# Patient Record
Sex: FEMALE | Race: BLACK OR AFRICAN AMERICAN | Employment: FULL TIME | URBAN - METROPOLITAN AREA
[De-identification: names, ages, dates, MRNs, and addresses within clinical notes are randomized per-mention and may not be internally consistent; named-entity substitution may affect disease eponyms.]

---

## 2017-01-13 ENCOUNTER — TELEPHONE (OUTPATIENT)
Dept: BARIATRICS/WEIGHT MGMT | Age: 57
End: 2017-01-13

## 2017-01-20 ENCOUNTER — TELEPHONE (OUTPATIENT)
Dept: BARIATRICS/WEIGHT MGMT | Age: 57
End: 2017-01-20

## 2017-01-20 ENCOUNTER — OFFICE VISIT (OUTPATIENT)
Dept: BARIATRICS/WEIGHT MGMT | Age: 57
End: 2017-01-20

## 2017-01-20 VITALS
DIASTOLIC BLOOD PRESSURE: 71 MMHG | HEART RATE: 78 BPM | RESPIRATION RATE: 16 BRPM | BODY MASS INDEX: 39.84 KG/M2 | WEIGHT: 216.5 LBS | SYSTOLIC BLOOD PRESSURE: 120 MMHG | HEIGHT: 62 IN

## 2017-01-20 DIAGNOSIS — K44.9 HIATAL HERNIA: ICD-10-CM

## 2017-01-20 DIAGNOSIS — G47.33 OSA (OBSTRUCTIVE SLEEP APNEA): ICD-10-CM

## 2017-01-20 DIAGNOSIS — K21.9 GASTROESOPHAGEAL REFLUX DISEASE WITHOUT ESOPHAGITIS: ICD-10-CM

## 2017-01-20 DIAGNOSIS — E66.9 OBESITY (BMI 30-39.9): Primary | ICD-10-CM

## 2017-01-20 DIAGNOSIS — I87.2 VENOUS INSUFFICIENCY OF BOTH LOWER EXTREMITIES: ICD-10-CM

## 2017-01-20 PROCEDURE — 99244 OFF/OP CNSLTJ NEW/EST MOD 40: CPT | Performed by: SURGERY

## 2017-01-20 RX ORDER — OXYCODONE HYDROCHLORIDE AND ACETAMINOPHEN 5; 325 MG/1; MG/1
TABLET ORAL
COMMUNITY
Start: 2016-11-01 | End: 2017-05-05 | Stop reason: ALTCHOICE

## 2017-01-25 ENCOUNTER — TELEPHONE (OUTPATIENT)
Dept: BARIATRICS/WEIGHT MGMT | Age: 57
End: 2017-01-25

## 2017-03-14 ENCOUNTER — INITIAL CONSULT (OUTPATIENT)
Dept: BARIATRICS/WEIGHT MGMT | Age: 57
End: 2017-03-14

## 2017-03-14 ENCOUNTER — TELEPHONE (OUTPATIENT)
Dept: BARIATRICS/WEIGHT MGMT | Age: 57
End: 2017-03-14

## 2017-03-14 DIAGNOSIS — E66.9 OBESITY (BMI 30-39.9): ICD-10-CM

## 2017-03-14 DIAGNOSIS — F34.1 DYSTHYMIC DISORDER: Primary | ICD-10-CM

## 2017-03-14 PROCEDURE — 90791 PSYCH DIAGNOSTIC EVALUATION: CPT | Performed by: PSYCHOLOGIST

## 2017-03-14 PROCEDURE — 96101 PR PSYCHOLOGIC TESTING BY PSYCH/PHYS: CPT | Performed by: PSYCHOLOGIST

## 2017-03-29 ENCOUNTER — OFFICE VISIT (OUTPATIENT)
Dept: BARIATRICS/WEIGHT MGMT | Age: 57
End: 2017-03-29

## 2017-03-29 VITALS
HEART RATE: 91 BPM | WEIGHT: 219 LBS | BODY MASS INDEX: 40.3 KG/M2 | HEIGHT: 62 IN | DIASTOLIC BLOOD PRESSURE: 73 MMHG | SYSTOLIC BLOOD PRESSURE: 126 MMHG | RESPIRATION RATE: 16 BRPM

## 2017-03-29 DIAGNOSIS — E66.01 MORBID OBESITY WITH BMI OF 40.0-44.9, ADULT (HCC): ICD-10-CM

## 2017-03-29 DIAGNOSIS — G47.33 OSA (OBSTRUCTIVE SLEEP APNEA): Primary | ICD-10-CM

## 2017-03-29 PROCEDURE — 99213 OFFICE O/P EST LOW 20 MIN: CPT | Performed by: NURSE PRACTITIONER

## 2017-03-29 ASSESSMENT — ENCOUNTER SYMPTOMS
EYES NEGATIVE: 1
ALLERGIC/IMMUNOLOGIC NEGATIVE: 1
RESPIRATORY NEGATIVE: 1
GASTROINTESTINAL NEGATIVE: 1

## 2017-04-25 ENCOUNTER — TELEPHONE (OUTPATIENT)
Dept: BARIATRICS/WEIGHT MGMT | Age: 57
End: 2017-04-25

## 2017-05-03 ENCOUNTER — TELEPHONE (OUTPATIENT)
Dept: INTERNAL MEDICINE CLINIC | Age: 57
End: 2017-05-03

## 2017-05-03 ENCOUNTER — OFFICE VISIT (OUTPATIENT)
Dept: BARIATRICS/WEIGHT MGMT | Age: 57
End: 2017-05-03

## 2017-05-03 VITALS
HEART RATE: 78 BPM | HEIGHT: 62 IN | BODY MASS INDEX: 40.67 KG/M2 | WEIGHT: 221 LBS | SYSTOLIC BLOOD PRESSURE: 117 MMHG | DIASTOLIC BLOOD PRESSURE: 67 MMHG

## 2017-05-03 DIAGNOSIS — E66.01 MORBID OBESITY WITH BMI OF 40.0-44.9, ADULT (HCC): Primary | ICD-10-CM

## 2017-05-03 DIAGNOSIS — E55.9 VITAMIN D DEFICIENCY: ICD-10-CM

## 2017-05-03 DIAGNOSIS — K21.9 GASTROESOPHAGEAL REFLUX DISEASE WITHOUT ESOPHAGITIS: ICD-10-CM

## 2017-05-03 PROCEDURE — 99213 OFFICE O/P EST LOW 20 MIN: CPT | Performed by: NURSE PRACTITIONER

## 2017-05-03 RX ORDER — FUROSEMIDE 40 MG/1
40 TABLET ORAL DAILY PRN
Qty: 30 TABLET | Refills: 0 | Status: SHIPPED | OUTPATIENT
Start: 2017-05-03 | End: 2019-11-27

## 2017-05-03 RX ORDER — POTASSIUM CHLORIDE 20 MEQ/1
TABLET, EXTENDED RELEASE ORAL
Qty: 30 TABLET | Refills: 0 | Status: SHIPPED | OUTPATIENT
Start: 2017-05-03 | End: 2020-03-25 | Stop reason: SDUPTHER

## 2017-05-03 ASSESSMENT — ENCOUNTER SYMPTOMS
ALLERGIC/IMMUNOLOGIC NEGATIVE: 1
EYES NEGATIVE: 1
RESPIRATORY NEGATIVE: 1
GASTROINTESTINAL NEGATIVE: 1

## 2017-05-08 ENCOUNTER — TELEPHONE (OUTPATIENT)
Dept: BARIATRICS/WEIGHT MGMT | Age: 57
End: 2017-05-08

## 2017-05-25 ENCOUNTER — OFFICE VISIT (OUTPATIENT)
Dept: INTERNAL MEDICINE CLINIC | Age: 57
End: 2017-05-25

## 2017-05-25 VITALS
DIASTOLIC BLOOD PRESSURE: 84 MMHG | WEIGHT: 219 LBS | SYSTOLIC BLOOD PRESSURE: 120 MMHG | HEIGHT: 63 IN | HEART RATE: 78 BPM | BODY MASS INDEX: 38.8 KG/M2

## 2017-05-25 DIAGNOSIS — K21.9 GASTROESOPHAGEAL REFLUX DISEASE WITHOUT ESOPHAGITIS: ICD-10-CM

## 2017-05-25 DIAGNOSIS — F41.9 ANXIETY: ICD-10-CM

## 2017-05-25 DIAGNOSIS — I87.2 VENOUS INSUFFICIENCY OF BOTH LOWER EXTREMITIES: ICD-10-CM

## 2017-05-25 DIAGNOSIS — G47.33 OSA (OBSTRUCTIVE SLEEP APNEA): ICD-10-CM

## 2017-05-25 DIAGNOSIS — K44.9 HIATAL HERNIA: ICD-10-CM

## 2017-05-25 DIAGNOSIS — E53.8 B12 DEFICIENCY: ICD-10-CM

## 2017-05-25 DIAGNOSIS — F43.9 STRESS: ICD-10-CM

## 2017-05-25 DIAGNOSIS — E55.9 VITAMIN D DEFICIENCY: ICD-10-CM

## 2017-05-25 DIAGNOSIS — F32.A DEPRESSION, UNCONTROLLED: ICD-10-CM

## 2017-05-25 DIAGNOSIS — E66.9 OBESITY (BMI 30-39.9): ICD-10-CM

## 2017-05-25 DIAGNOSIS — Z00.00 ANNUAL PHYSICAL EXAM: Primary | ICD-10-CM

## 2017-05-25 PROBLEM — E66.01 MORBID OBESITY WITH BMI OF 40.0-44.9, ADULT (HCC): Status: RESOLVED | Noted: 2017-03-29 | Resolved: 2017-05-25

## 2017-05-25 LAB
A/G RATIO: 2 (ref 1.1–2.2)
ALBUMIN SERPL-MCNC: 4.3 G/DL (ref 3.4–5)
ALP BLD-CCNC: 84 U/L (ref 40–129)
ALT SERPL-CCNC: 31 U/L (ref 10–40)
ANION GAP SERPL CALCULATED.3IONS-SCNC: 14 MMOL/L (ref 3–16)
AST SERPL-CCNC: 26 U/L (ref 15–37)
BASOPHILS ABSOLUTE: 0.1 K/UL (ref 0–0.2)
BASOPHILS RELATIVE PERCENT: 1.3 %
BILIRUB SERPL-MCNC: 0.3 MG/DL (ref 0–1)
BILIRUBIN, POC: NORMAL
BLOOD URINE, POC: NORMAL
BUN BLDV-MCNC: 12 MG/DL (ref 7–20)
CALCIUM SERPL-MCNC: 9.4 MG/DL (ref 8.3–10.6)
CHLORIDE BLD-SCNC: 100 MMOL/L (ref 99–110)
CHOLESTEROL, TOTAL: 174 MG/DL (ref 0–199)
CLARITY, POC: CLEAR
CO2: 24 MMOL/L (ref 21–32)
COLOR, POC: YELLOW
CREAT SERPL-MCNC: 0.6 MG/DL (ref 0.6–1.1)
EOSINOPHILS ABSOLUTE: 0.1 K/UL (ref 0–0.6)
EOSINOPHILS RELATIVE PERCENT: 1.7 %
FOLATE: 4.99 NG/ML (ref 4.78–24.2)
GFR AFRICAN AMERICAN: >60
GFR NON-AFRICAN AMERICAN: >60
GLOBULIN: 2.1 G/DL
GLUCOSE BLD-MCNC: 91 MG/DL (ref 70–99)
GLUCOSE URINE, POC: NORMAL
HCT VFR BLD CALC: 38.6 % (ref 36–48)
HDLC SERPL-MCNC: 48 MG/DL (ref 40–60)
HEMOGLOBIN: 12.2 G/DL (ref 12–16)
IRON SATURATION: 20 % (ref 15–50)
IRON: 88 UG/DL (ref 37–145)
KETONES, POC: NORMAL
LDL CHOLESTEROL CALCULATED: 113 MG/DL
LEUKOCYTE EST, POC: NORMAL
LYMPHOCYTES ABSOLUTE: 1.6 K/UL (ref 1–5.1)
LYMPHOCYTES RELATIVE PERCENT: 38.2 %
MCH RBC QN AUTO: 28.4 PG (ref 26–34)
MCHC RBC AUTO-ENTMCNC: 31.6 G/DL (ref 31–36)
MCV RBC AUTO: 89.8 FL (ref 80–100)
MONOCYTES ABSOLUTE: 0.4 K/UL (ref 0–1.3)
MONOCYTES RELATIVE PERCENT: 10.4 %
NEUTROPHILS ABSOLUTE: 2.1 K/UL (ref 1.7–7.7)
NEUTROPHILS RELATIVE PERCENT: 48.4 %
NITRITE, POC: NORMAL
PDW BLD-RTO: 14.8 % (ref 12.4–15.4)
PH, POC: 5
PLATELET # BLD: 339 K/UL (ref 135–450)
PMV BLD AUTO: 8.8 FL (ref 5–10.5)
POTASSIUM SERPL-SCNC: 4.5 MMOL/L (ref 3.5–5.1)
PROTEIN, POC: NORMAL
RBC # BLD: 4.3 M/UL (ref 4–5.2)
SODIUM BLD-SCNC: 138 MMOL/L (ref 136–145)
SPECIFIC GRAVITY, POC: 1
TOTAL IRON BINDING CAPACITY: 444 UG/DL (ref 260–445)
TOTAL PROTEIN: 6.4 G/DL (ref 6.4–8.2)
TRIGL SERPL-MCNC: 67 MG/DL (ref 0–150)
TSH REFLEX: 1.2 UIU/ML (ref 0.27–4.2)
UROBILINOGEN, POC: 0.2
VITAMIN B-12: 214 PG/ML (ref 211–911)
VITAMIN D 25-HYDROXY: 28.5 NG/ML
VLDLC SERPL CALC-MCNC: 13 MG/DL
WBC # BLD: 4.3 K/UL (ref 4–11)

## 2017-05-25 PROCEDURE — 93000 ELECTROCARDIOGRAM COMPLETE: CPT | Performed by: INTERNAL MEDICINE

## 2017-05-25 PROCEDURE — 81002 URINALYSIS NONAUTO W/O SCOPE: CPT | Performed by: INTERNAL MEDICINE

## 2017-05-25 PROCEDURE — 99396 PREV VISIT EST AGE 40-64: CPT | Performed by: INTERNAL MEDICINE

## 2017-05-25 ASSESSMENT — PATIENT HEALTH QUESTIONNAIRE - PHQ9
2. FEELING DOWN, DEPRESSED OR HOPELESS: 0
SUM OF ALL RESPONSES TO PHQ9 QUESTIONS 1 & 2: 0
SUM OF ALL RESPONSES TO PHQ QUESTIONS 1-9: 0

## 2017-05-26 LAB
ESTIMATED AVERAGE GLUCOSE: 128.4 MG/DL
HBA1C MFR BLD: 6.1 %

## 2017-06-07 ENCOUNTER — OFFICE VISIT (OUTPATIENT)
Dept: BARIATRICS/WEIGHT MGMT | Age: 57
End: 2017-06-07

## 2017-06-07 VITALS
SYSTOLIC BLOOD PRESSURE: 118 MMHG | HEIGHT: 62 IN | HEART RATE: 86 BPM | RESPIRATION RATE: 16 BRPM | DIASTOLIC BLOOD PRESSURE: 66 MMHG | BODY MASS INDEX: 39.93 KG/M2 | WEIGHT: 217 LBS

## 2017-06-07 DIAGNOSIS — E66.9 OBESITY (BMI 30-39.9): Primary | ICD-10-CM

## 2017-06-07 DIAGNOSIS — K21.9 GASTROESOPHAGEAL REFLUX DISEASE WITHOUT ESOPHAGITIS: ICD-10-CM

## 2017-06-07 DIAGNOSIS — G47.33 OSA (OBSTRUCTIVE SLEEP APNEA): ICD-10-CM

## 2017-06-07 PROCEDURE — 99213 OFFICE O/P EST LOW 20 MIN: CPT | Performed by: NURSE PRACTITIONER

## 2017-06-07 ASSESSMENT — ENCOUNTER SYMPTOMS
GASTROINTESTINAL NEGATIVE: 1
ALLERGIC/IMMUNOLOGIC NEGATIVE: 1
RESPIRATORY NEGATIVE: 1
EYES NEGATIVE: 1

## 2017-06-12 ENCOUNTER — OFFICE VISIT (OUTPATIENT)
Dept: PSYCHOLOGY | Age: 57
End: 2017-06-12

## 2017-06-12 DIAGNOSIS — F43.23 ADJUSTMENT DISORDER WITH MIXED ANXIETY AND DEPRESSED MOOD: Primary | ICD-10-CM

## 2017-06-12 PROCEDURE — 90791 PSYCH DIAGNOSTIC EVALUATION: CPT | Performed by: PSYCHOLOGIST

## 2017-07-12 ENCOUNTER — OFFICE VISIT (OUTPATIENT)
Dept: BARIATRICS/WEIGHT MGMT | Age: 57
End: 2017-07-12

## 2017-07-12 VITALS
DIASTOLIC BLOOD PRESSURE: 60 MMHG | HEART RATE: 70 BPM | BODY MASS INDEX: 39.56 KG/M2 | SYSTOLIC BLOOD PRESSURE: 114 MMHG | RESPIRATION RATE: 16 BRPM | WEIGHT: 215 LBS | HEIGHT: 62 IN

## 2017-07-12 DIAGNOSIS — E66.9 OBESITY (BMI 30-39.9): Primary | ICD-10-CM

## 2017-07-12 DIAGNOSIS — K21.9 GASTROESOPHAGEAL REFLUX DISEASE WITHOUT ESOPHAGITIS: ICD-10-CM

## 2017-07-12 DIAGNOSIS — G47.33 OSA (OBSTRUCTIVE SLEEP APNEA): ICD-10-CM

## 2017-07-12 PROCEDURE — 99213 OFFICE O/P EST LOW 20 MIN: CPT | Performed by: NURSE PRACTITIONER

## 2017-07-12 ASSESSMENT — ENCOUNTER SYMPTOMS
RESPIRATORY NEGATIVE: 1
EYES NEGATIVE: 1
GASTROINTESTINAL NEGATIVE: 1
ALLERGIC/IMMUNOLOGIC NEGATIVE: 1

## 2017-08-03 ENCOUNTER — TELEPHONE (OUTPATIENT)
Dept: INTERNAL MEDICINE CLINIC | Age: 57
End: 2017-08-03

## 2017-08-04 ENCOUNTER — OFFICE VISIT (OUTPATIENT)
Dept: INTERNAL MEDICINE CLINIC | Age: 57
End: 2017-08-04

## 2017-08-04 VITALS
BODY MASS INDEX: 38.23 KG/M2 | WEIGHT: 209 LBS | DIASTOLIC BLOOD PRESSURE: 69 MMHG | SYSTOLIC BLOOD PRESSURE: 111 MMHG | HEART RATE: 77 BPM

## 2017-08-04 DIAGNOSIS — B37.0 THRUSH, ORAL: Primary | ICD-10-CM

## 2017-08-04 PROCEDURE — 99213 OFFICE O/P EST LOW 20 MIN: CPT | Performed by: NURSE PRACTITIONER

## 2017-08-04 RX ORDER — SAW/VIT E/SOD SEL/LYC/BETA/PYG 160-100
1 TABLET ORAL DAILY
COMMUNITY
Start: 2017-08-04 | End: 2018-11-29

## 2017-08-04 RX ORDER — DOXYCYCLINE HYCLATE 100 MG/1
CAPSULE ORAL
COMMUNITY
Start: 2017-07-30 | End: 2017-10-04

## 2017-08-04 RX ORDER — CIPROFLOXACIN 500 MG/1
TABLET, FILM COATED ORAL
COMMUNITY
Start: 2017-07-30 | End: 2017-10-04

## 2017-08-16 ENCOUNTER — OFFICE VISIT (OUTPATIENT)
Dept: BARIATRICS/WEIGHT MGMT | Age: 57
End: 2017-08-16

## 2017-08-16 VITALS
WEIGHT: 207 LBS | SYSTOLIC BLOOD PRESSURE: 124 MMHG | BODY MASS INDEX: 38.09 KG/M2 | DIASTOLIC BLOOD PRESSURE: 84 MMHG | RESPIRATION RATE: 16 BRPM | HEART RATE: 80 BPM | HEIGHT: 62 IN

## 2017-08-16 DIAGNOSIS — E66.9 OBESITY (BMI 30-39.9): Primary | ICD-10-CM

## 2017-08-16 DIAGNOSIS — G47.33 OSA (OBSTRUCTIVE SLEEP APNEA): ICD-10-CM

## 2017-08-16 DIAGNOSIS — K21.9 GASTROESOPHAGEAL REFLUX DISEASE WITHOUT ESOPHAGITIS: ICD-10-CM

## 2017-08-16 PROCEDURE — 99213 OFFICE O/P EST LOW 20 MIN: CPT | Performed by: NURSE PRACTITIONER

## 2017-08-16 ASSESSMENT — ENCOUNTER SYMPTOMS
GASTROINTESTINAL NEGATIVE: 1
EYES NEGATIVE: 1
ALLERGIC/IMMUNOLOGIC NEGATIVE: 1
RESPIRATORY NEGATIVE: 1

## 2017-10-04 ENCOUNTER — OFFICE VISIT (OUTPATIENT)
Dept: BARIATRICS/WEIGHT MGMT | Age: 57
End: 2017-10-04

## 2017-10-04 VITALS
HEART RATE: 84 BPM | WEIGHT: 208 LBS | DIASTOLIC BLOOD PRESSURE: 70 MMHG | SYSTOLIC BLOOD PRESSURE: 112 MMHG | HEIGHT: 62 IN | BODY MASS INDEX: 38.28 KG/M2

## 2017-10-04 DIAGNOSIS — E66.9 CLASS 2 OBESITY: Primary | ICD-10-CM

## 2017-10-04 DIAGNOSIS — F41.8 DEPRESSION WITH ANXIETY: ICD-10-CM

## 2017-10-04 DIAGNOSIS — Z79.899 HIGH RISK MEDICATIONS (NOT ANTICOAGULANTS) LONG-TERM USE: ICD-10-CM

## 2017-10-04 DIAGNOSIS — G47.33 OSA (OBSTRUCTIVE SLEEP APNEA): ICD-10-CM

## 2017-10-04 DIAGNOSIS — Z71.3 DIETARY COUNSELING AND SURVEILLANCE: ICD-10-CM

## 2017-10-04 PROCEDURE — 99215 OFFICE O/P EST HI 40 MIN: CPT | Performed by: FAMILY MEDICINE

## 2017-10-04 ASSESSMENT — ENCOUNTER SYMPTOMS
RESPIRATORY NEGATIVE: 1
EYES NEGATIVE: 1
GASTROINTESTINAL NEGATIVE: 1

## 2017-10-04 NOTE — PATIENT INSTRUCTIONS
meal (like eating out or at home, eating alone, or with friends or family). · What you do to be active. Look and plan. As you track, look for patterns that you may want to change. Take note of:  · When you eat and whether you skip meals. · How often you eat out. · How many fruits and vegetables you eat. · When you eat beyond feeling full. · When and why you eat for reasons other than being hungry. When you stray from your plan, don't get upset. Figure out what made you slip up and how you can fix it. Can you take medicines or have surgery to lose weight? Before your doctor will prescribe medicines or surgery, he or she will probably want you to be more active and follow your healthy eating plan for a period of time. These habits are key lifelong changes for managing your weight, with or without other medical treatment. And these changes can help you avoid weight-related health problems. Follow-up care is a key part of your treatment and safety. Be sure to make and go to all appointments, and call your doctor if you are having problems. It's also a good idea to know your test results and keep a list of the medicines you take. Where can you learn more? Go to https://NeediumpeUnisense [employer].[email or web address] [email or web address] org and sign in to your [place] account. Enter N111 in the [facility] box to learn more about \"Learning About Obesity. \"     If you do not have an account, please click on the \"Sign Up Now\" link. Current as of: October 13, 2016  Content Version: 11.3  © 4483-9538 [employer]. Care instructions adapted under license by [facility] ([facility]). If you have questions about a medical condition or this instruction, always ask your healthcare professional. [address] any warranty or liability for your use of this information.

## 2017-10-04 NOTE — MR AVS SNAPSHOT
· Break your long-term goal into smaller, short-term goals. Make these small steps specific and within your reach, things you know you can do. These steps are what keep you going from day to day. How can you stay on your plan for change? Be ready. Choose to start during a time when there are few events that might trigger slip-ups, like holidays, social events, and high-stress periods. Decide on your first few steps. Most people have more success when they make small changes, one step at a time. For example, you might switch a daily candy bar to a piece of fruit, walk 10 minutes more, or add more vegetables to a meal.  Line up your support people. Make sure you're not going to be alone as you make this change. Connect with people who understand how important it is to you. Ask family members and friends for help in keeping with your plan. And think about who could make it harder for you, and how to handle them. Try tracking. People who keep track of what they eat, feel, and do are better at losing weight. Try writing down things like:  · What and how much you eat. · How you feel before and after each meal.  · Details about each meal (like eating out or at home, eating alone, or with friends or family). · What you do to be active. Look and plan. As you track, look for patterns that you may want to change. Take note of:  · When you eat and whether you skip meals. · How often you eat out. · How many fruits and vegetables you eat. · When you eat beyond feeling full. · When and why you eat for reasons other than being hungry. When you stray from your plan, don't get upset. Figure out what made you slip up and how you can fix it. Can you take medicines or have surgery to lose weight? Before your doctor will prescribe medicines or surgery, he or she will probably want you to be more active and follow your healthy eating plan for a period of time.  These habits are key lifelong changes for managing

## 2017-10-04 NOTE — PROGRESS NOTES
and the importance of regular protein intake       [x] Hidden CHO/carbohydrate sources  [x] Alcohol use  [x] Tobacco use  [x] Drug use- Denies   [x] Importance of exercise and reducing sedentary time    GAYATHRI: On CPAP. Gets 5-7 hours of sleep/night. Depression: Stable on Cymbalta. Denies any new or worsening symptoms. Allergies   Allergen Reactions    Adhesive Tape      Red skin    Diflucan [Fluconazole] Other (See Comments)     Central nervous system    Flagyl [Metronidazole Hcl]      Central nervous system    Sulfacetamide Sodium-Sulfur Dermatitis         Current Outpatient Prescriptions:     Probiotic Product (PROBIOTIC & ACIDOPHILUS EX ST) CAPS, Take 1 capsule by mouth daily, Disp: , Rfl:     potassium chloride (KLOR-CON M) 20 MEQ extended release tablet, TAKE 1 TABLET BY MOUTH DAILY, Disp: 30 tablet, Rfl: 0    furosemide (LASIX) 40 MG tablet, Take 1 tablet by mouth daily as needed (for swelling), Disp: 30 tablet, Rfl: 0    DULoxetine (CYMBALTA) 60 MG extended release capsule, TAKE ONE CAPSULE BY MOUTH EVERY DAY, Disp: 90 capsule, Rfl: 1    ferrous sulfate 220 (44 FE) MG/5ML solution, Take 5 mLs by mouth daily. , Disp: 450 mL, Rfl: 0    hydrocortisone 2.5 % cream, Apply to the eyebrows daily if needed for scaling. Use no more than 2 weeks at a time. , Disp: 30 g, Rfl: 2    Azelastine-Fluticasone (DYMISTA NA), by Nasal route daily. , Disp: , Rfl:     Multiple Vitamin (MULTIVITAMIN PO), Take  by mouth daily. , Disp: , Rfl:     levocetirizine (XYZAL) 5 MG tablet, Take 5 mg by mouth nightly.  , Disp: , Rfl:     Cholecalciferol (VITAMIN D) 2000 UNIT TABS, Take  by mouth daily. , Disp: 30 tablet, Rfl:     mometasone (ASMANEX) 220 MCG/INH inhaler, Inhale 1 puff into the lungs 2 times daily. , Disp: , Rfl:     montelukast (SINGULAIR) 10 MG TABS, Take 1 Tab by mouth daily. , Disp: , Rfl:     Patient Active Problem List   Diagnosis    GERD (gastroesophageal reflux disease)    Anxiety    Arthritis

## 2017-11-06 RX ORDER — DULOXETIN HYDROCHLORIDE 60 MG/1
CAPSULE, DELAYED RELEASE ORAL
Qty: 90 CAPSULE | Refills: 1 | Status: SHIPPED | OUTPATIENT
Start: 2017-11-06 | End: 2018-11-26 | Stop reason: SDUPTHER

## 2017-11-09 ENCOUNTER — HOSPITAL ENCOUNTER (OUTPATIENT)
Dept: OTHER | Age: 57
Discharge: HOME | End: 2017-11-09
Attending: FAMILY MEDICINE | Admitting: FAMILY MEDICINE

## 2017-11-09 DIAGNOSIS — E66.9 CLASS 2 OBESITY: ICD-10-CM

## 2017-11-09 LAB
A/G RATIO: 1.2 (ref 1.1–2.2)
ALBUMIN SERPL-MCNC: 3.9 G/DL (ref 3.4–5)
ALP BLD-CCNC: 98 U/L (ref 40–129)
ALT SERPL-CCNC: 24 U/L (ref 10–40)
ANION GAP SERPL CALCULATED.3IONS-SCNC: 12 MMOL/L (ref 3–16)
AST SERPL-CCNC: 22 U/L (ref 15–37)
BASOPHILS ABSOLUTE: 0.1 K/UL (ref 0–0.2)
BASOPHILS RELATIVE PERCENT: 1.2 %
BILIRUB SERPL-MCNC: 0.3 MG/DL (ref 0–1)
BUN BLDV-MCNC: 13 MG/DL (ref 7–20)
CALCIUM SERPL-MCNC: 10 MG/DL (ref 8.3–10.6)
CHLORIDE BLD-SCNC: 102 MMOL/L (ref 99–110)
CHOLESTEROL, TOTAL: 185 MG/DL (ref 0–199)
CO2: 26 MMOL/L (ref 21–32)
CREAT SERPL-MCNC: 0.6 MG/DL (ref 0.6–1.1)
EOSINOPHILS ABSOLUTE: 0.1 K/UL (ref 0–0.6)
EOSINOPHILS RELATIVE PERCENT: 1.6 %
ESTIMATED AVERAGE GLUCOSE: 119.8 MG/DL
FOLATE: 3.85 NG/ML (ref 4.78–24.2)
GFR AFRICAN AMERICAN: >60
GFR NON-AFRICAN AMERICAN: >60
GLOBULIN: 3.3 G/DL
GLUCOSE BLD-MCNC: 85 MG/DL (ref 70–99)
HBA1C MFR BLD: 5.8 %
HCT VFR BLD CALC: 39.4 % (ref 36–48)
HDLC SERPL-MCNC: 58 MG/DL (ref 40–60)
HEMOGLOBIN: 12.6 G/DL (ref 12–16)
LDL CHOLESTEROL CALCULATED: 114 MG/DL
LYMPHOCYTES ABSOLUTE: 1.4 K/UL (ref 1–5.1)
LYMPHOCYTES RELATIVE PERCENT: 31.4 %
MCH RBC QN AUTO: 28.9 PG (ref 26–34)
MCHC RBC AUTO-ENTMCNC: 31.9 G/DL (ref 31–36)
MCV RBC AUTO: 90.7 FL (ref 80–100)
MONOCYTES ABSOLUTE: 0.4 K/UL (ref 0–1.3)
MONOCYTES RELATIVE PERCENT: 9.7 %
NEUTROPHILS ABSOLUTE: 2.5 K/UL (ref 1.7–7.7)
NEUTROPHILS RELATIVE PERCENT: 56.1 %
PDW BLD-RTO: 15 % (ref 12.4–15.4)
PLATELET # BLD: 344 K/UL (ref 135–450)
PMV BLD AUTO: 9.2 FL (ref 5–10.5)
POTASSIUM SERPL-SCNC: 4.2 MMOL/L (ref 3.5–5.1)
RBC # BLD: 4.34 M/UL (ref 4–5.2)
SODIUM BLD-SCNC: 140 MMOL/L (ref 136–145)
TOTAL PROTEIN: 7.2 G/DL (ref 6.4–8.2)
TRIGL SERPL-MCNC: 65 MG/DL (ref 0–150)
TSH REFLEX: 1.37 UIU/ML (ref 0.27–4.2)
VITAMIN B-12: 194 PG/ML (ref 211–911)
VITAMIN D 25-HYDROXY: 21 NG/ML
VLDLC SERPL CALC-MCNC: 13 MG/DL
WBC # BLD: 4.5 K/UL (ref 4–11)

## 2017-11-13 ENCOUNTER — OFFICE VISIT (OUTPATIENT)
Dept: BARIATRICS/WEIGHT MGMT | Age: 57
End: 2017-11-13

## 2017-11-13 VITALS
SYSTOLIC BLOOD PRESSURE: 132 MMHG | WEIGHT: 206 LBS | HEIGHT: 62 IN | HEART RATE: 72 BPM | DIASTOLIC BLOOD PRESSURE: 86 MMHG | BODY MASS INDEX: 37.91 KG/M2

## 2017-11-13 DIAGNOSIS — E55.9 VITAMIN D INSUFFICIENCY: ICD-10-CM

## 2017-11-13 DIAGNOSIS — E66.9 CLASS 2 OBESITY: Primary | ICD-10-CM

## 2017-11-13 DIAGNOSIS — E53.8 B12 DEFICIENCY: ICD-10-CM

## 2017-11-13 DIAGNOSIS — E53.8 FOLATE DEFICIENCY: ICD-10-CM

## 2017-11-13 DIAGNOSIS — F41.8 DEPRESSION WITH ANXIETY: ICD-10-CM

## 2017-11-13 DIAGNOSIS — Z71.3 DIETARY COUNSELING AND SURVEILLANCE: ICD-10-CM

## 2017-11-13 PROCEDURE — 93000 ELECTROCARDIOGRAM COMPLETE: CPT | Performed by: FAMILY MEDICINE

## 2017-11-13 PROCEDURE — 99214 OFFICE O/P EST MOD 30 MIN: CPT | Performed by: FAMILY MEDICINE

## 2017-11-13 RX ORDER — FOLIC ACID 1 MG/1
1 TABLET ORAL DAILY
Qty: 30 TABLET | Refills: 0 | Status: SHIPPED | OUTPATIENT
Start: 2017-11-13 | End: 2017-12-19 | Stop reason: SDUPTHER

## 2017-11-13 RX ORDER — CHOLECALCIFEROL (VITAMIN D3) 125 MCG
500 CAPSULE ORAL DAILY
Qty: 30 TABLET | Refills: 3 | Status: SHIPPED | OUTPATIENT
Start: 2017-11-13 | End: 2018-05-29

## 2017-11-13 ASSESSMENT — ENCOUNTER SYMPTOMS
GASTROINTESTINAL NEGATIVE: 1
EYES NEGATIVE: 1
RESPIRATORY NEGATIVE: 1

## 2017-11-13 NOTE — PROGRESS NOTES
Patient: Cecelia Payne                      Encounter Date: 11/13/2017    YOB: 1960                Age: 64 y.o. No chief complaint on file. /86   Pulse 72   Ht 5' 2\" (1.575 m)   Wt 206 lb (93.4 kg)   LMP  (LMP Unknown)   BMI 37.68 kg/m²     Body mass index is 37.68 kg/m². Waist Circumference  Waist (Inches): 41.5 in    HPI: 64 y.o. female with a long-standing history of obesity presents today for follow-up. she has lost 2 pounds since her last visit on 10/4. Current treatment includes 1200-Husam/low carb diet and exercise. Tolerating it well. Using the meal plan more as a guide than actually following it. Met with the dietitian today. Food recall reviewed with the patient. She is interested in learning more about aom to help with her cravings. Labs 11/9: Vitamin D 21, folate 3.85, vitamin B12 194      Exercise: [x]Cardio- 60 minutes/week     []Resistance/strength training     []Other:     Allergies   Allergen Reactions    Adhesive Tape      Red skin    Diflucan [Fluconazole] Other (See Comments)     Central nervous system    Flagyl [Metronidazole Hcl]      Central nervous system    Sulfacetamide Sodium-Sulfur Dermatitis         Current Outpatient Prescriptions:     naproxen-diphenhydramine (ALEVE PM) 220-25 MG TABS, Take by mouth, Disp: , Rfl:     DULoxetine (CYMBALTA) 60 MG extended release capsule, TAKE ONE CAPSULE BY MOUTH EVERY DAY, Disp: 90 capsule, Rfl: 1    Probiotic Product (PROBIOTIC & ACIDOPHILUS EX ST) CAPS, Take 1 capsule by mouth daily, Disp: , Rfl:     potassium chloride (KLOR-CON M) 20 MEQ extended release tablet, TAKE 1 TABLET BY MOUTH DAILY, Disp: 30 tablet, Rfl: 0    furosemide (LASIX) 40 MG tablet, Take 1 tablet by mouth daily as needed (for swelling), Disp: 30 tablet, Rfl: 0    ferrous sulfate 220 (44 FE) MG/5ML solution, Take 5 mLs by mouth daily. , Disp: 450 mL, Rfl: 0    hydrocortisone 2.5 % cream, Apply to the eyebrows daily if needed for 11/09/2017 194* 211 - 911 pg/mL Final    Folate 11/09/2017 3.85* 4.78 - 24.20 ng/mL Final    Comment: Effective 11-15-16 10:00am EST  Please note reference ranges have  changed for Folate.  Vit D, 25-Hydroxy 11/09/2017 21.0* >=30 ng/mL Final    Comment: <=20 ng/mL. ........... Valaria Ding Deficient  21-29 ng/mL. ......... Valaria Ding Insufficient  >=30 ng/mL. ........ Valaria Ding Sufficient      WBC 11/09/2017 4.5  4.0 - 11.0 K/uL Final    RBC 11/09/2017 4.34  4.00 - 5.20 M/uL Final    Hemoglobin 11/09/2017 12.6  12.0 - 16.0 g/dL Final    Hematocrit 11/09/2017 39.4  36.0 - 48.0 % Final    MCV 11/09/2017 90.7  80.0 - 100.0 fL Final    MCH 11/09/2017 28.9  26.0 - 34.0 pg Final    MCHC 11/09/2017 31.9  31.0 - 36.0 g/dL Final    RDW 11/09/2017 15.0  12.4 - 15.4 % Final    Platelets 95/52/2582 344  135 - 450 K/uL Final    MPV 11/09/2017 9.2  5.0 - 10.5 fL Final    Neutrophils % 11/09/2017 56.1  % Final    Lymphocytes % 11/09/2017 31.4  % Final    Monocytes % 11/09/2017 9.7  % Final    Eosinophils % 11/09/2017 1.6  % Final    Basophils % 11/09/2017 1.2  % Final    Neutrophils # 11/09/2017 2.5  1.7 - 7.7 K/uL Final    Lymphocytes # 11/09/2017 1.4  1.0 - 5.1 K/uL Final    Monocytes # 11/09/2017 0.4  0.0 - 1.3 K/uL Final    Eosinophils # 11/09/2017 0.1  0.0 - 0.6 K/uL Final    Basophils # 11/09/2017 0.1  0.0 - 0.2 K/uL Final         Assessment and Plan:    ICD-10-CM ICD-9-CM    1. Class 2 obesity E66.9 278.00 Improving. Reinforced low carb diet. Will start aom to help with appetite regulation/cravings. Discussed risks, benefits and alternatives of Qsymia. Patient meets BMI criteria, confirms negative pregnancy status, denies any significant coronary artery disease, glaucoma or hyperthyroidism. she denies MAOI use within the past 14 days and has no known hypersensitivity to the sympathomimetic amines, kidney or liver impairment.     Start Qsymia 3.75 mg/23 mg once daily in the morning for two weeks and then follow-up in two weeks to pregnancy tests each month. Orders Placed This Encounter   Procedures    EKG 12 Lead     Order Specific Question:   Reason for Exam?     Answer: Other       No Follow-up on file. This dictation was performed with a verbal recognition program (Dragon) and all efforts were made to ensure accuracy of this dictation. It is possible that there are still dictated errors within this note. If so, please bring any errors to my attention for correction.

## 2017-11-13 NOTE — PATIENT INSTRUCTIONS
changes in your eating habits. Instead of a diet, focus on lifestyle changes that will improve your health and achieve the right balance of energy and calories. To lose weight, you need to burn more calories than you take in. You can do it by eating healthy foods in reasonable amounts and becoming more active, even a little bit every day. Making small changes over time can add up to a lot. Make a plan for change. Many people have found that naming their reasons for change and staying focused on their plan can make a big difference. Work with your doctor to create a plan that is right for you. · Ask yourself: Mercedes Anderson are my personal, most powerful reasons for wanting this change? What will my life look like when I've made the change? \"  · Set your long-term goal. Make it specific, such as \"I will lose x pounds. \"  · Break your long-term goal into smaller, short-term goals. Make these small steps specific and within your reach, things you know you can do. These steps are what keep you going from day to day. How can you stay on your plan for change? Be ready. Choose to start during a time when there are few events that might trigger slip-ups, like holidays, social events, and high-stress periods. Decide on your first few steps. Most people have more success when they make small changes, one step at a time. For example, you might switch a daily candy bar to a piece of fruit, walk 10 minutes more, or add more vegetables to a meal.  Line up your support people. Make sure you're not going to be alone as you make this change. Connect with people who understand how important it is to you. Ask family members and friends for help in keeping with your plan. And think about who could make it harder for you, and how to handle them. Try tracking. People who keep track of what they eat, feel, and do are better at losing weight. Try writing down things like:  · What and how much you eat.   · How you feel before and after each meal.  · Details about each meal (like eating out or at home, eating alone, or with friends or family). · What you do to be active. Look and plan. As you track, look for patterns that you may want to change. Take note of:  · When you eat and whether you skip meals. · How often you eat out. · How many fruits and vegetables you eat. · When you eat beyond feeling full. · When and why you eat for reasons other than being hungry. When you stray from your plan, don't get upset. Figure out what made you slip up and how you can fix it. Can you take medicines or have surgery to lose weight? Before your doctor will prescribe medicines or surgery, he or she will probably want you to be more active and follow your healthy eating plan for a period of time. These habits are key lifelong changes for managing your weight, with or without other medical treatment. And these changes can help you avoid weight-related health problems. Follow-up care is a key part of your treatment and safety. Be sure to make and go to all appointments, and call your doctor if you are having problems. It's also a good idea to know your test results and keep a list of the medicines you take. Where can you learn more? Go to https://[email or web address].[email or web address] org and sign in to your Clue App account. Enter N111 in the [employer] box to learn more about \"Learning About Obesity. \"     If you do not have an account, please click on the \"Sign Up Now\" link. Current as of: October 13, 2016  Content Version: 11.3  © 1474-4396 [employer]orporated. Care instructions adapted under license by [facility] ([facility]). If you have questions about a medical condition or this instruction, always ask your healthcare professional. [place] [address] any warranty or liability for your use of this information.      Plan/Goals:   - Plan protein based snacks for work  - Increase water intake to 48-64 oz water  - Increase exercise to 4

## 2017-11-28 ENCOUNTER — OFFICE VISIT (OUTPATIENT)
Dept: INTERNAL MEDICINE CLINIC | Age: 57
End: 2017-11-28

## 2017-11-28 VITALS
BODY MASS INDEX: 38.09 KG/M2 | WEIGHT: 207 LBS | HEART RATE: 74 BPM | HEIGHT: 62 IN | RESPIRATION RATE: 12 BRPM | DIASTOLIC BLOOD PRESSURE: 78 MMHG | SYSTOLIC BLOOD PRESSURE: 120 MMHG

## 2017-11-28 DIAGNOSIS — Z23 NEED FOR INFLUENZA VACCINATION: ICD-10-CM

## 2017-11-28 DIAGNOSIS — E53.8 B12 DEFICIENCY: ICD-10-CM

## 2017-11-28 DIAGNOSIS — E55.9 VITAMIN D DEFICIENCY: ICD-10-CM

## 2017-11-28 DIAGNOSIS — I87.2 VENOUS INSUFFICIENCY OF BOTH LOWER EXTREMITIES: ICD-10-CM

## 2017-11-28 DIAGNOSIS — F33.0 MILD EPISODE OF RECURRENT MAJOR DEPRESSIVE DISORDER (HCC): ICD-10-CM

## 2017-11-28 DIAGNOSIS — F41.9 ANXIETY: ICD-10-CM

## 2017-11-28 DIAGNOSIS — E53.8 FOLATE DEFICIENCY: ICD-10-CM

## 2017-11-28 DIAGNOSIS — J45.20 MILD INTERMITTENT ASTHMA WITHOUT COMPLICATION: Primary | ICD-10-CM

## 2017-11-28 DIAGNOSIS — G47.33 OSA (OBSTRUCTIVE SLEEP APNEA): ICD-10-CM

## 2017-11-28 PROCEDURE — 99214 OFFICE O/P EST MOD 30 MIN: CPT | Performed by: INTERNAL MEDICINE

## 2017-11-28 PROCEDURE — 90686 IIV4 VACC NO PRSV 0.5 ML IM: CPT | Performed by: INTERNAL MEDICINE

## 2017-11-28 PROCEDURE — 90471 IMMUNIZATION ADMIN: CPT | Performed by: INTERNAL MEDICINE

## 2017-11-28 NOTE — PROGRESS NOTES
Vaccine Information Sheet, \"Influenza - Inactivated\"  given to Alvarado Andino, or parent/legal guardian of  Alvarado Andino and verbalized understanding. Patient responses:    Have you ever had a reaction to a flu vaccine? No  Are you able to eat eggs without adverse effects? Yes  Do you have any current illness? No  Have you ever had Guillian [place] Syndrome? No    Flu vaccine given per order. Please see immunization tab.

## 2017-11-28 NOTE — PATIENT INSTRUCTIONS
Please have ABIMAEL Patino send records including imaging    Discussed with Dr. Melissa Dc regarding vitamin B12 injections. The oral supplementation may not be adequate. Continue your excellent work with diet and exercise      Patient Education        Learning About Vitamin D  Why is it important to get enough vitamin D? Your body needs vitamin D to absorb calcium. Calcium keeps your bones and muscles, including your heart, healthy and strong. If your muscles don't get enough calcium, they can cramp, hurt, or feel weak. You may have long-term (chronic) muscle aches and pains. If you don't get enough vitamin D throughout life, you have an increased chance of having thin and brittle bones (osteoporosis) in your later years. Children who don't get enough vitamin D may not grow as much as others their age. They also have a chance of getting a rare disease called rickets. It causes weak bones. Vitamin D and calcium are added to many foods. And your body uses sunshine to make its own vitamin D. How much vitamin D do you need? The [place] [employer] recommends that people ages 3 through 79 get 600 IU (international units) every day. Adults 71 and older need 800 IU every day. Blood tests for vitamin D can check your vitamin D level. But there is no standard normal range used by all laboratories. The [place] [employer] recommends a blood level of 20 ng/mL of vitamin D for healthy bones. And most people in the [place] and [facility] ([facility]) meet this goal.  How can you get more vitamin D? Foods that contain vitamin D include:  · [place], tuna, and mackerel. These are some of the best foods to eat when you need to get more vitamin D.  · Cheese, egg yolks, and beef liver. These foods have vitamin D in small amounts. · Milk, soy drinks, orange juice, yogurt, margarine, and some kinds of cereal have vitamin D added to them. Some people don't make vitamin D as well as others.  They may have to take extra care in getting Incorporated disclaims any warranty or liability for your use of this information.

## 2017-11-28 NOTE — PROGRESS NOTES
[facility]) Physicians  Internal Medicine  Patient Encounter  Emmer Canavan, D.O., [facility]        Chief Complaint   Patient presents with   Amee Starr    Medication Check       HPI: 62 y.o. female with multiple medical issues who presents today for follow up regarding the status of those issues listed below along with a medication review. Health maintenance items or overdue:  Flu vaccine  Pap smear    Asthma--  She denies cough, SOB, wheezing. Patient is maintained on Asmanex and Singulair. She's not needed rescue inhaler. Her last PFT's-- 10/2012. She follows with the allergist.    Depression/Anxiety-- Patient remains on Cymbalta without adverse side effects. She does state that her mood is good. Anxiety is well controlled. She is coping well. She does have some added stress with her 's health issues. Vit D def--her last level was 21 on November 9, 2017. She is on vitamin D supplementation. SHe used to be on 50,000 weekly. Dr. Gus Vital placed her on 2000 units daily. B12 Def-- lab from November 9, 2017 again revealed vitamin B12 deficiency and folate deficiency. Vitamin B12 was 194, folate 3.85. Patient is not very compliant with her B12 injections over the years. Folic acid was called in. Oral B12 was not successful in the past.      Venous insufficiency-- patient has struggled with this over the years. She does wear her compression stockings. Swelling is a bit better with the 14# weight loss. GAYATHRI-- She is on CPAP. She has not been seen by the  sleep clinic in 2 years. With the weight loss, GAYATHRI will need to be reassessed with another study. Obesity-- she is under the care of Dr. Gus Vital, medical weight loss specialist.  Patient is trying to adhere to a calorie restricted diet, exercise. She is now on Qsymia. She just started this. She had initial success once she started diet and exercise. She states she lost 14#. She had changed her mind about surgery.

## 2017-12-08 ENCOUNTER — OFFICE VISIT (OUTPATIENT)
Dept: BARIATRICS/WEIGHT MGMT | Age: 57
End: 2017-12-08

## 2017-12-08 VITALS
SYSTOLIC BLOOD PRESSURE: 124 MMHG | BODY MASS INDEX: 38.09 KG/M2 | WEIGHT: 207 LBS | HEART RATE: 88 BPM | HEIGHT: 62 IN | DIASTOLIC BLOOD PRESSURE: 84 MMHG

## 2017-12-08 DIAGNOSIS — Z71.3 DIETARY COUNSELING AND SURVEILLANCE: ICD-10-CM

## 2017-12-08 DIAGNOSIS — E66.9 CLASS 2 OBESITY: Primary | ICD-10-CM

## 2017-12-08 PROCEDURE — 99213 OFFICE O/P EST LOW 20 MIN: CPT | Performed by: FAMILY MEDICINE

## 2017-12-08 ASSESSMENT — ENCOUNTER SYMPTOMS
RESPIRATORY NEGATIVE: 1
EYES NEGATIVE: 1
GASTROINTESTINAL NEGATIVE: 1

## 2017-12-08 NOTE — PROGRESS NOTES
Patient: Melodie Mike                      Encounter Date: 12/8/2017    YOB: 1960                Age: 62 y.o. Chief Complaint   Patient presents with    Weight Management     3rd MWM, Qsymia, 1200 andrew/ LC       /84   Pulse 88   Ht 5' 2\" (1.575 m)   Wt 207 lb (93.9 kg)   LMP  (LMP Unknown)   BMI 37.86 kg/m²     Body mass index is 37.86 kg/m². HPI: 62 y.o. female with a long-standing history of obesity presents today for follow-up. she has gained 1 pounds since her last visit. She started Qsymia on 11/21. She took her last dose 4-5 days ago. She had to reschedule her last appointment due to her 's recent health issues. She hasn't been able to make all of the recommended dietary changes. Had a stressful few weeks. Motivated to start making the recommended changes. She tolerated Qsymia. No side effects. Met with the dietitian today. Food recall reviewed with the patient. Exercise: []Cardio     []Resistance/strength training     [x]Other: none    Allergies   Allergen Reactions    Adhesive Tape      Red skin    Diflucan [Fluconazole] Other (See Comments)     Central nervous system    Flagyl [Metronidazole Hcl]      Central nervous system    Sulfacetamide Sodium-Sulfur Dermatitis         Current Outpatient Prescriptions:     Phentermine-Topiramate (QSYMIA) 7.5-46 MG CP24, Take 1 capsule by mouth daily . , Disp: 30 capsule, Rfl: 0    naproxen-diphenhydramine (ALEVE PM) 220-25 MG TABS, Take by mouth, Disp: , Rfl:     folic acid (FOLVITE) 1 MG tablet, Take 1 tablet by mouth daily, Disp: 30 tablet, Rfl: 0    vitamin B-12 (CYANOCOBALAMIN) 500 MCG tablet, Take 1 tablet by mouth daily, Disp: 30 tablet, Rfl: 3    Cholecalciferol 2000 units TABS, Take 1 tablet by mouth daily, Disp: 90 tablet, Rfl: 2    DULoxetine (CYMBALTA) 60 MG extended release capsule, TAKE ONE CAPSULE BY MOUTH EVERY DAY, Disp: 90 capsule, Rfl: 1    Probiotic Product (PROBIOTIC & ACIDOPHILUS EX ST) CAPS, Take 1 capsule by mouth daily, Disp: , Rfl:     potassium chloride (KLOR-CON M) 20 MEQ extended release tablet, TAKE 1 TABLET BY MOUTH DAILY, Disp: 30 tablet, Rfl: 0    furosemide (LASIX) 40 MG tablet, Take 1 tablet by mouth daily as needed (for swelling), Disp: 30 tablet, Rfl: 0    ferrous sulfate 220 (44 FE) MG/5ML solution, Take 5 mLs by mouth daily. , Disp: 450 mL, Rfl: 0    hydrocortisone 2.5 % cream, Apply to the eyebrows daily if needed for scaling. Use no more than 2 weeks at a time. , Disp: 30 g, Rfl: 2    Azelastine-Fluticasone (DYMISTA NA), by Nasal route daily. , Disp: , Rfl:     Multiple Vitamin (MULTIVITAMIN PO), Take  by mouth daily. , Disp: , Rfl:     levocetirizine (XYZAL) 5 MG tablet, Take 5 mg by mouth nightly.  , Disp: , Rfl:     Cholecalciferol (VITAMIN D) 2000 UNIT TABS, Take  by mouth daily. , Disp: 30 tablet, Rfl:     mometasone (ASMANEX) 220 MCG/INH inhaler, Inhale 1 puff into the lungs 2 times daily. , Disp: , Rfl:     montelukast (SINGULAIR) 10 MG TABS, Take 1 Tab by mouth daily. , Disp: , Rfl:     Patient Active Problem List   Diagnosis    GERD (gastroesophageal reflux disease)    Anxiety    Arthritis    Hiatal hernia    Vitamin D deficiency    Iron deficiency anemia    B12 deficiency    Asthma    GAYATHRI (obstructive sleep apnea)    Bilateral leg edema    Hearing disorder, sensorineural    Obesity (BMI 30-39. 9)    Venous insufficiency of both lower extremities    Folate deficiency       Review of Systems   HENT: Negative. Eyes: Negative. Respiratory: Negative. Cardiovascular: Negative. Gastrointestinal: Negative. Endocrine: Negative. Musculoskeletal: Negative. Neurological: Negative. Psychiatric/Behavioral: Negative. Physical Exam   Constitutional: She is oriented to person, place, and time. She appears well-developed and well-nourished.    Eyes: Conjunctivae and EOM are normal.   Cardiovascular: Normal rate, regular rhythm and normal Nonpregnant Adults: <7.0%                    Pregnant: <6.0%        eAG 11/09/2017 119.8  mg/dL Final    Cholesterol, Total 11/09/2017 185  0 - 199 mg/dL Final    Triglycerides 11/09/2017 65  0 - 150 mg/dL Final    HDL 11/09/2017 58  40 - 60 mg/dL Final    LDL Calculated 11/09/2017 114* <100 mg/dL Final    VLDL CHOLESTEROL CALCULATED 11/09/2017 13  Not Established mg/dL Final    TSH 11/09/2017 1.37  0.27 - 4.20 uIU/mL Final    Vitamin B-12 11/09/2017 194* 211 - 911 pg/mL Final    Folate 11/09/2017 3.85* 4.78 - 24.20 ng/mL Final    Comment: Effective 11-15-16 10:00am EST  Please note reference ranges have  changed for Folate.  Vit D, 25-Hydroxy 11/09/2017 21.0* >=30 ng/mL Final    Comment: <=20 ng/mL. ........... Obed Ramal Deficient  21-29 ng/mL. ......... Obed Ramal Insufficient  >=30 ng/mL. ........ Obed Ramal Sufficient      WBC 11/09/2017 4.5  4.0 - 11.0 K/uL Final    RBC 11/09/2017 4.34  4.00 - 5.20 M/uL Final    Hemoglobin 11/09/2017 12.6  12.0 - 16.0 g/dL Final    Hematocrit 11/09/2017 39.4  36.0 - 48.0 % Final    MCV 11/09/2017 90.7  80.0 - 100.0 fL Final    MCH 11/09/2017 28.9  26.0 - 34.0 pg Final    MCHC 11/09/2017 31.9  31.0 - 36.0 g/dL Final    RDW 11/09/2017 15.0  12.4 - 15.4 % Final    Platelets 69/64/8477 344  135 - 450 K/uL Final    MPV 11/09/2017 9.2  5.0 - 10.5 fL Final    Neutrophils % 11/09/2017 56.1  % Final    Lymphocytes % 11/09/2017 31.4  % Final    Monocytes % 11/09/2017 9.7  % Final    Eosinophils % 11/09/2017 1.6  % Final    Basophils % 11/09/2017 1.2  % Final    Neutrophils # 11/09/2017 2.5  1.7 - 7.7 K/uL Final    Lymphocytes # 11/09/2017 1.4  1.0 - 5.1 K/uL Final    Monocytes # 11/09/2017 0.4  0.0 - 1.3 K/uL Final    Eosinophils # 11/09/2017 0.1  0.0 - 0.6 K/uL Final    Basophils # 11/09/2017 0.1  0.0 - 0.2 K/uL Final         Assessment and Plan:    ICD-10-CM ICD-9-CM    1. Class 2 obesity E66.9 278.00 Once again, discussed risks and benefits of Qsymia.  Patient meets BMI criteria, confirms negative pregnancy status, denies glaucoma, kidney or liver impairment, hyperthyroidism, MAOI use within the past 14 days and has no known hypersensitivity to the sympathomimetic amines. Increase Qsymia to 7.5-46 mg daily. Counseled on proper use and potential side effects. she understands that it is her responsibility to make sure that she does not run out of medications and to follow up to her appointments every 24 weeks as recommended. Heavily counseled on the importance of therapeutic lifestyle changes through diet and exercise. 2. BMI 37.0-37.9, adult Z68.37 V85.37    3.  Dietary counseling and surveillance Z71.3 V65.3 1200-Husam/low carb diet         Nutrition Plan: [] LCHF/Ketogenic   [x] Modified low-calorie diet (low carb/low-husam)               [] Low-calorie diet    [] Maintenance       []Other        Exercise: [] Cardio     [x] Resistance/strength training                       [x] ACSM recommendations (150 minutes/week in active weight loss)                   [] Prevention of weight gain (150-250 minutes/week)           Behavior: [x] Motivational interviewing performed    [] Referral for counseling                         [x] Discussed strategies to overcome habits/challenges for focus         [x] Stress management   [] Stimulus control         [] Sleep hygiene      Reviewed:  [x] Nutrition and the importance of regular protein intake  [x] Hidden carbohydrate sources  [x] Alcohol use  [x] Tobacco use   [x] Drug use- Denies  [x] Importance of exercise and reducing sedentary time  [x] Treatment consent- Patient understands and agrees with the treatment plan   [x] Proper use of medication and side effects  [x] OARRS report 11/19/2017 1  QSYMIA 3.75 MG-23 MG CAPSULE 14 14      Patient denies any history of cardiovascular disease (e.g., CAD, stroke, arrhythmias, CHF, uncontrolled HTN), seizure disorder, MAOI use within the last 2 weeks, hyperthyroidism, glaucoma, agitated states, history of drug abuse, pregnancy, nursing, known hypersensitivity to the prescribing meds, history of pancreatitis, or personal or family history of thyroid medullary cancer. Treatment start date: 11/14/17  12 weeks: 2/14/18  Starting weight: 206 pounds  Goal: At least 5% (10 pounds)  Total weight loss: +1 pound      Key dietary points:    - Meats (preferably organic or grass fed) are great sources of protein and have no carbohydrates. - Recommend coconut, olive, avocado, or almond oils. - When buying dairy, choose regular or full fat options. - Choose vegetables that grow above ground as they are generally lower in carbohydrates and higher in fiber.  - Avoid starches such as bread, rice, potatoes, pasta and all sources of simple sugars (desserts, soda, breakfast cereals). - Choose beverages that are calorie and sugar free. Reminder regarding weight loss medications: You must be seen in office every 2-4 weeks to have your prescriptions refilled. If you are off of your medication for longer than 7 days, you will not be able to restart the medication for at least 6 months. Always call our office to report any side effects. Females, it is your responsibility to obtain negative pregnancy tests each month. No orders of the defined types were placed in this encounter. No Follow-up on file. Greater than 50% of this 15 minute visit was used in direct counseling. This dictation was performed with a verbal recognition program (Dragon) and all efforts were made to ensure accuracy of this dictation. It is possible that there are still dictated errors within this note. If so, please bring any errors to my attention for correction.

## 2017-12-08 NOTE — PATIENT INSTRUCTIONS
changes in your eating habits. Instead of a diet, focus on lifestyle changes that will improve your health and achieve the right balance of energy and calories. To lose weight, you need to burn more calories than you take in. You can do it by eating healthy foods in reasonable amounts and becoming more active, even a little bit every day. Making small changes over time can add up to a lot. Make a plan for change. Many people have found that naming their reasons for change and staying focused on their plan can make a big difference. Work with your doctor to create a plan that is right for you. · Ask yourself: Jackie Pulse are my personal, most powerful reasons for wanting this change? What will my life look like when I've made the change? \"  · Set your long-term goal. Make it specific, such as \"I will lose x pounds. \"  · Break your long-term goal into smaller, short-term goals. Make these small steps specific and within your reach, things you know you can do. These steps are what keep you going from day to day. How can you stay on your plan for change? Be ready. Choose to start during a time when there are few events that might trigger slip-ups, like holidays, social events, and high-stress periods. Decide on your first few steps. Most people have more success when they make small changes, one step at a time. For example, you might switch a daily candy bar to a piece of fruit, walk 10 minutes more, or add more vegetables to a meal.  Line up your support people. Make sure you're not going to be alone as you make this change. Connect with people who understand how important it is to you. Ask family members and friends for help in keeping with your plan. And think about who could make it harder for you, and how to handle them. Try tracking. People who keep track of what they eat, feel, and do are better at losing weight. Try writing down things like:  · What and how much you eat.   · How you feel before and after each meal.  · Details about each meal (like eating out or at home, eating alone, or with friends or family). · What you do to be active. Look and plan. As you track, look for patterns that you may want to change. Take note of:  · When you eat and whether you skip meals. · How often you eat out. · How many fruits and vegetables you eat. · When you eat beyond feeling full. · When and why you eat for reasons other than being hungry. When you stray from your plan, don't get upset. Figure out what made you slip up and how you can fix it. Can you take medicines or have surgery to lose weight? Before your doctor will prescribe medicines or surgery, he or she will probably want you to be more active and follow your healthy eating plan for a period of time. These habits are key lifelong changes for managing your weight, with or without other medical treatment. And these changes can help you avoid weight-related health problems. Follow-up care is a key part of your treatment and safety. Be sure to make and go to all appointments, and call your doctor if you are having problems. It's also a good idea to know your test results and keep a list of the medicines you take. Where can you learn more? Go to https://[email or web address] org and sign in to your [employer] account. Enter N111 in the [employer] box to learn more about \"Learning About Obesity. \"     If you do not have an account, please click on the \"Sign Up Now\" link. Current as of: October 13, 2016  Content Version: 11.4  © 4912-5539 Healthwise, Incorporated. Care instructions adapted under license by [facility] ([facility]). If you have questions about a medical condition or this instruction, always ask your healthcare professional. [address] any warranty or liability for your use of this information.

## 2017-12-19 DIAGNOSIS — E66.9 CLASS 2 OBESITY: ICD-10-CM

## 2017-12-19 RX ORDER — FOLIC ACID 1 MG/1
1 TABLET ORAL DAILY
Qty: 30 TABLET | Refills: 3 | Status: SHIPPED | OUTPATIENT
Start: 2017-12-19 | End: 2018-05-29

## 2017-12-26 ENCOUNTER — HOSPITAL ENCOUNTER (OUTPATIENT)
Dept: MAMMOGRAPHY | Age: 57
Discharge: HOME | End: 2017-12-26

## 2017-12-26 DIAGNOSIS — Z12.31 ENCOUNTER FOR SCREENING MAMMOGRAM FOR BREAST CANCER: ICD-10-CM

## 2018-01-05 LAB
HPV COMMENT: NORMAL
HPV TYPE 16: NOT DETECTED
HPV TYPE 18: NOT DETECTED
HPVOH (OTHER TYPES): NOT DETECTED

## 2018-01-11 ENCOUNTER — OFFICE VISIT (OUTPATIENT)
Dept: BARIATRICS/WEIGHT MGMT | Age: 58
End: 2018-01-11

## 2018-01-11 VITALS
BODY MASS INDEX: 37.73 KG/M2 | DIASTOLIC BLOOD PRESSURE: 86 MMHG | SYSTOLIC BLOOD PRESSURE: 122 MMHG | WEIGHT: 205 LBS | HEART RATE: 84 BPM | HEIGHT: 62 IN

## 2018-01-11 DIAGNOSIS — F41.8 DEPRESSION WITH ANXIETY: ICD-10-CM

## 2018-01-11 DIAGNOSIS — Z71.3 DIETARY COUNSELING AND SURVEILLANCE: ICD-10-CM

## 2018-01-11 DIAGNOSIS — E66.9 CLASS 2 OBESITY: Primary | ICD-10-CM

## 2018-01-11 PROCEDURE — 99213 OFFICE O/P EST LOW 20 MIN: CPT | Performed by: FAMILY MEDICINE

## 2018-01-11 RX ORDER — SOLIFENACIN SUCCINATE 5 MG/1
TABLET, FILM COATED ORAL
Refills: 12 | COMMUNITY
Start: 2018-01-09 | End: 2018-11-29 | Stop reason: CLARIF

## 2018-01-11 ASSESSMENT — ENCOUNTER SYMPTOMS
RESPIRATORY NEGATIVE: 1
EYES NEGATIVE: 1
GASTROINTESTINAL NEGATIVE: 1

## 2018-01-11 NOTE — PATIENT INSTRUCTIONS
changes in your eating habits. Instead of a diet, focus on lifestyle changes that will improve your health and achieve the right balance of energy and calories. To lose weight, you need to burn more calories than you take in. You can do it by eating healthy foods in reasonable amounts and becoming more active, even a little bit every day. Making small changes over time can add up to a lot. Make a plan for change. Many people have found that naming their reasons for change and staying focused on their plan can make a big difference. Work with your doctor to create a plan that is right for you. · Ask yourself: Lucinda Grade are my personal, most powerful reasons for wanting this change? What will my life look like when I've made the change? \"  · Set your long-term goal. Make it specific, such as \"I will lose x pounds. \"  · Break your long-term goal into smaller, short-term goals. Make these small steps specific and within your reach, things you know you can do. These steps are what keep you going from day to day. How can you stay on your plan for change? Be ready. Choose to start during a time when there are few events that might trigger slip-ups, like holidays, social events, and high-stress periods. Decide on your first few steps. Most people have more success when they make small changes, one step at a time. For example, you might switch a daily candy bar to a piece of fruit, walk 10 minutes more, or add more vegetables to a meal.  Line up your support people. Make sure you're not going to be alone as you make this change. Connect with people who understand how important it is to you. Ask family members and friends for help in keeping with your plan. And think about who could make it harder for you, and how to handle them. Try tracking. People who keep track of what they eat, feel, and do are better at losing weight. Try writing down things like:  · What and how much you eat.   · How you feel before and after each meal.  · Details about each meal (like eating out or at home, eating alone, or with friends or family). · What you do to be active. Look and plan. As you track, look for patterns that you may want to change. Take note of:  · When you eat and whether you skip meals. · How often you eat out. · How many fruits and vegetables you eat. · When you eat beyond feeling full. · When and why you eat for reasons other than being hungry. When you stray from your plan, don't get upset. Figure out what made you slip up and how you can fix it. Can you take medicines or have surgery to lose weight? Before your doctor will prescribe medicines or surgery, he or she will probably want you to be more active and follow your healthy eating plan for a period of time. These habits are key lifelong changes for managing your weight, with or without other medical treatment. And these changes can help you avoid weight-related health problems. Follow-up care is a key part of your treatment and safety. Be sure to make and go to all appointments, and call your doctor if you are having problems. It's also a good idea to know your test results and keep a list of the medicines you take. Where can you learn more? Go to https://[email or web address]. org and sign in to your P[employer] account. Enter N111 in the [employer] box to learn more about \"Learning About Obesity. \"     If you do not have an account, please click on the \"Sign Up Now\" link. Current as of: October 13, 2016  Content Version: 11.5  © 8692-6987 Healthwise, Incorporated. Care instructions adapted under license by [facility] ([facility]). If you have questions about a medical condition or this instruction, always ask your healthcare professional. [address] any warranty or liability for your use of this information.

## 2018-01-11 NOTE — PROGRESS NOTES
counseling. 4. Depression with anxiety F41.8 300.4 Stable. Nutrition Plan: [] LCHF/Ketogenic   [x] Modified low-calorie diet (low carb/low-andrew)               [] Low-calorie diet    [] Maintenance       []Other        Exercise: [x] Cardio     [x] Resistance/strength training                       [x] ACSM recommendations (150 minutes/week in active weight loss)                                  [] Prevention of weight gain (150-250 minutes/week)         Behavior: [x] Motivational interviewing performed    [] Referral for counseling                         [x] Discussed strategies to overcome habits/challenges for focus         [x] Stress management   [x] Stimulus control         [] Sleep hygiene      Reviewed:  [x] Nutrition and the importance of regular protein intake  [x] Hidden carbohydrate sources  [x] Alcohol use  [x] Tobacco use   [x] Drug use- Denies  [x] Importance of exercise and reducing sedentary time  [x] Treatment consent- Patient understands and agrees with the treatment plan   [x] Proper use of medication and side effects  [x] OARRS report 12/11/2017 1  QSYMIA 7.5 MG-46 MG CAPSULE 30 30      Patient denies any history of cardiovascular disease (e.g., CAD, stroke, arrhythmias, CHF, uncontrolled HTN), seizure disorder, MAOI use within the last 2 weeks, hyperthyroidism, glaucoma, agitated states, history of drug abuse, pregnancy, nursing, known hypersensitivity to the prescribing meds, history of pancreatitis, or personal or family history of thyroid medullary cancer. Treatment start date: 11/14/17  12 weeks: 2/14/18  Starting weight: 206 pounds  Goal: At least 5% (10 pounds)  Total weight loss: 1 pound    Key dietary points:    - Meats (preferably organic or grass fed) are great sources of protein and have no carbohydrates. - Recommend coconut, olive, avocado, or almond oils. - When buying dairy, choose regular or full fat options.   - Choose vegetables that grow above ground as they are generally lower in carbohydrates and higher in fiber.  - Avoid starches such as bread, rice, potatoes, pasta and all sources of simple sugars (desserts, soda, breakfast cereals). - Choose beverages that are calorie and sugar free. Reminder regarding weight loss medications: You must be seen in office every 2-4 weeks to have your prescriptions refilled. If you are off of your medication for longer than 7 days, you will not be able to restart the medication for at least 6 months. Always call our office to report any side effects. Females, it is your responsibility to obtain negative pregnancy tests each month. No orders of the defined types were placed in this encounter. No Follow-up on file. This dictation was performed with a verbal recognition program (Dragon) and all efforts were made to ensure accuracy of this dictation. It is possible that there are still dictated errors within this note. If so, please bring any errors to my attention for correction.

## 2018-01-30 ENCOUNTER — OFFICE VISIT (OUTPATIENT)
Dept: INTERNAL MEDICINE CLINIC | Age: 58
End: 2018-01-30

## 2018-01-30 VITALS
BODY MASS INDEX: 37.13 KG/M2 | WEIGHT: 203 LBS | DIASTOLIC BLOOD PRESSURE: 80 MMHG | SYSTOLIC BLOOD PRESSURE: 120 MMHG | TEMPERATURE: 97.7 F

## 2018-01-30 DIAGNOSIS — R51.9 ACUTE NONINTRACTABLE HEADACHE, UNSPECIFIED HEADACHE TYPE: ICD-10-CM

## 2018-01-30 DIAGNOSIS — B34.9 VIRAL ILLNESS: ICD-10-CM

## 2018-01-30 DIAGNOSIS — R11.0 NAUSEA: ICD-10-CM

## 2018-01-30 DIAGNOSIS — R10.30 LOWER ABDOMINAL PAIN: Primary | ICD-10-CM

## 2018-01-30 DIAGNOSIS — R10.10 UPPER ABDOMINAL PAIN: ICD-10-CM

## 2018-01-30 LAB
A/G RATIO: 1.6 (ref 1.1–2.2)
ALBUMIN SERPL-MCNC: 4.4 G/DL (ref 3.4–5)
ALP BLD-CCNC: 92 U/L (ref 40–129)
ALT SERPL-CCNC: 24 U/L (ref 10–40)
AMYLASE: 79 U/L (ref 25–115)
ANION GAP SERPL CALCULATED.3IONS-SCNC: 13 MMOL/L (ref 3–16)
AST SERPL-CCNC: 21 U/L (ref 15–37)
BASOPHILS ABSOLUTE: 0.1 K/UL (ref 0–0.2)
BASOPHILS RELATIVE PERCENT: 1.1 %
BILIRUB SERPL-MCNC: <0.2 MG/DL (ref 0–1)
BILIRUBIN, POC: NORMAL
BLOOD URINE, POC: NORMAL
BUN BLDV-MCNC: 10 MG/DL (ref 7–20)
CALCIUM SERPL-MCNC: 10.1 MG/DL (ref 8.3–10.6)
CHLORIDE BLD-SCNC: 103 MMOL/L (ref 99–110)
CLARITY, POC: NORMAL
CO2: 25 MMOL/L (ref 21–32)
COLOR, POC: NORMAL
CREAT SERPL-MCNC: 0.7 MG/DL (ref 0.6–1.1)
EOSINOPHILS ABSOLUTE: 0.1 K/UL (ref 0–0.6)
EOSINOPHILS RELATIVE PERCENT: 1.2 %
GFR AFRICAN AMERICAN: >60
GFR NON-AFRICAN AMERICAN: >60
GLOBULIN: 2.7 G/DL
GLUCOSE BLD-MCNC: 86 MG/DL (ref 70–99)
GLUCOSE URINE, POC: NORMAL
HCT VFR BLD CALC: 39.4 % (ref 36–48)
HEMOGLOBIN: 12.7 G/DL (ref 12–16)
KETONES, POC: NORMAL
LEUKOCYTE EST, POC: NORMAL
LIPASE: 36 U/L (ref 13–60)
LYMPHOCYTES ABSOLUTE: 1.8 K/UL (ref 1–5.1)
LYMPHOCYTES RELATIVE PERCENT: 36.7 %
MCH RBC QN AUTO: 29.4 PG (ref 26–34)
MCHC RBC AUTO-ENTMCNC: 32.4 G/DL (ref 31–36)
MCV RBC AUTO: 90.8 FL (ref 80–100)
MONOCYTES ABSOLUTE: 0.5 K/UL (ref 0–1.3)
MONOCYTES RELATIVE PERCENT: 10.3 %
NEUTROPHILS ABSOLUTE: 2.5 K/UL (ref 1.7–7.7)
NEUTROPHILS RELATIVE PERCENT: 50.7 %
NITRITE, POC: NORMAL
PDW BLD-RTO: 15.3 % (ref 12.4–15.4)
PH, POC: 5
PLATELET # BLD: 352 K/UL (ref 135–450)
PMV BLD AUTO: 9.1 FL (ref 5–10.5)
POTASSIUM SERPL-SCNC: 4.2 MMOL/L (ref 3.5–5.1)
PROTEIN, POC: NORMAL
RAPID INFLUENZA  B AGN: NORMAL
RAPID INFLUENZA A AGN: NORMAL
RBC # BLD: 4.33 M/UL (ref 4–5.2)
SODIUM BLD-SCNC: 141 MMOL/L (ref 136–145)
SPECIFIC GRAVITY, POC: 1
TOTAL PROTEIN: 7.1 G/DL (ref 6.4–8.2)
UROBILINOGEN, POC: 0.2
WBC # BLD: 4.9 K/UL (ref 4–11)

## 2018-01-30 PROCEDURE — 87804 INFLUENZA ASSAY W/OPTIC: CPT | Performed by: INTERNAL MEDICINE

## 2018-01-30 PROCEDURE — 99213 OFFICE O/P EST LOW 20 MIN: CPT | Performed by: INTERNAL MEDICINE

## 2018-01-30 PROCEDURE — 81002 URINALYSIS NONAUTO W/O SCOPE: CPT | Performed by: INTERNAL MEDICINE

## 2018-01-30 RX ORDER — ONDANSETRON 4 MG/1
4 TABLET, FILM COATED ORAL EVERY 6 HOURS PRN
Qty: 30 TABLET | Refills: 0 | Status: SHIPPED | OUTPATIENT
Start: 2018-01-30 | End: 2021-08-18

## 2018-01-30 NOTE — PATIENT INSTRUCTIONS
while you have a fever. When should you call for help? Call 911 anytime you think you may need emergency care. For example, call if:  ? · You have severe trouble breathing. ? · You passed out (lost consciousness). ?Call your doctor now or seek immediate medical care if:  ? · You seem to be getting much sicker. ? · You have a new or higher fever. ? · You have blood in your stools. ? · You have new belly pain, or your pain gets worse. ? · You have a new rash. ? Watch closely for changes in your health, and be sure to contact your doctor if:  ? · You start to get better and then get worse. ? · You do not get better as expected. Where can you learn more? Go to [email or web address] org and sign in to your [employer] account. Enter E024 in the [employer] box to learn more about \"Viral Infections: Care Instructions. \"     If you do not have an account, please click on the \"Sign Up Now\" link. Current as of: March 3, 2017  Content Version: 11.5  © 0288-4749 [employer]. Care instructions adapted under license by [facility] ([facility]). If you have questions about a medical condition or this instruction, always ask your healthcare professional. [address] any warranty or liability for your use of this information. Patient Education        Nausea and Vomiting: Care Instructions  Your Care Instructions    When you are nauseated, you may feel weak and sweaty and notice a lot of saliva in your mouth. Nausea often leads to vomiting. Most of the time you do not need to worry about nausea and vomiting, but they can be signs of other illnesses. Two common causes of nausea and vomiting are stomach flu and food poisoning. Nausea and vomiting from viral stomach flu will usually start to improve within 24 hours. Nausea and vomiting from food poisoning may last from 12 to 48 hours. The doctor has checked you carefully, but problems can develop later.  If you account, please click on the \"Sign Up Now\" link. Current as of: March 20, 2017  Content Version: 11.5  © 0144-8715 [employer]. Care instructions adapted under license by [facility] ([facility]). If you have questions about a medical condition or this instruction, always ask your healthcare professional. [address] any warranty or liability for your use of this information. Patient Education        Abdominal Pain: Care Instructions  Your Care Instructions    Abdominal pain has many possible causes. Some aren't serious and get better on their own in a few days. Others need more testing and treatment. If your pain continues or gets worse, you need to be rechecked and may need more tests to find out what is wrong. You may need surgery to correct the problem. Don't ignore new symptoms, such as fever, nausea and vomiting, urination problems, pain that gets worse, and dizziness. These may be signs of a more serious problem. Your doctor may have recommended a follow-up visit in the next 8 to 12 hours. If you are not getting better, you may need more tests or treatment. The doctor has checked you carefully, but problems can develop later. If you notice any problems or new symptoms, get medical treatment right away. Follow-up care is a key part of your treatment and safety. Be sure to make and go to all appointments, and call your doctor if you are having problems. It's also a good idea to know your test results and keep a list of the medicines you take. How can you care for yourself at home? · Rest until you feel better. · To prevent dehydration, drink plenty of fluids, enough so that your urine is light yellow or clear like water. Choose water and other caffeine-free clear liquids until you feel better. If you have kidney, heart, or liver disease and have to limit fluids, talk with your doctor before you increase the amount of fluids you drink.   · If your stomach is upset, eat mild

## 2018-02-12 ENCOUNTER — OFFICE VISIT (OUTPATIENT)
Dept: BARIATRICS/WEIGHT MGMT | Age: 58
End: 2018-02-12

## 2018-02-12 VITALS
HEIGHT: 62 IN | HEART RATE: 72 BPM | SYSTOLIC BLOOD PRESSURE: 126 MMHG | DIASTOLIC BLOOD PRESSURE: 80 MMHG | BODY MASS INDEX: 36.99 KG/M2 | WEIGHT: 201 LBS

## 2018-02-12 DIAGNOSIS — E66.9 CLASS 2 OBESITY: Primary | ICD-10-CM

## 2018-02-12 DIAGNOSIS — Z71.3 DIETARY COUNSELING AND SURVEILLANCE: ICD-10-CM

## 2018-02-12 PROCEDURE — 99213 OFFICE O/P EST LOW 20 MIN: CPT | Performed by: FAMILY MEDICINE

## 2018-02-12 NOTE — PROGRESS NOTES
Patient: Nhan Tsang                      Encounter Date: 2/12/2018    YOB: 1960                Age: 62 y.o. Chief Complaint   Patient presents with    Weight Management     5th MWM, Qsymia       /80   Pulse 72   Ht 5' 2\" (1.575 m)   Wt 201 lb (91.2 kg)   LMP  (LMP Unknown)   BMI 36.76 kg/m²     Body mass index is 36.76 kg/m². HPI: 62 y.o. female with a long-standing history of obesity presents today for follow-up. she has lost 4 pounds since her last visit. Current treatment includes Qsymia 7.5-46 mg daily an dlow carb/andrew diet and exercise. Tolerating it well. Met with the dietitian today. Food recall reviewed with the patient. Would like to discontinue Qsymia and just focus on therapeutic lifetyle changes.          Allergies   Allergen Reactions    Adhesive Tape      Red skin    Diflucan [Fluconazole] Other (See Comments)     Central nervous system    Flagyl [Metronidazole Hcl]      Central nervous system    Sulfacetamide Sodium-Sulfur Dermatitis         Current Outpatient Prescriptions:     ondansetron (ZOFRAN) 4 MG tablet, Take 1 tablet by mouth every 6 hours as needed for Nausea or Vomiting, Disp: 30 tablet, Rfl: 0    VESICARE 5 MG tablet, , Disp: , Rfl: 12    folic acid (FOLVITE) 1 MG tablet, Take 1 tablet by mouth daily, Disp: 30 tablet, Rfl: 3    naproxen-diphenhydramine (ALEVE PM) 220-25 MG TABS, Take by mouth, Disp: , Rfl:     vitamin B-12 (CYANOCOBALAMIN) 500 MCG tablet, Take 1 tablet by mouth daily, Disp: 30 tablet, Rfl: 3    Cholecalciferol 2000 units TABS, Take 1 tablet by mouth daily, Disp: 90 tablet, Rfl: 2    DULoxetine (CYMBALTA) 60 MG extended release capsule, TAKE ONE CAPSULE BY MOUTH EVERY DAY, Disp: 90 capsule, Rfl: 1    Probiotic Product (PROBIOTIC & ACIDOPHILUS EX ST) CAPS, Take 1 capsule by mouth daily, Disp: , Rfl:     potassium chloride (KLOR-CON M) 20 MEQ extended release tablet, TAKE 1 TABLET BY MOUTH DAILY, Disp: 30 tablet, Rfl: 0    furosemide (LASIX) 40 MG tablet, Take 1 tablet by mouth daily as needed (for swelling), Disp: 30 tablet, Rfl: 0    ferrous sulfate 220 (44 FE) MG/5ML solution, Take 5 mLs by mouth daily. , Disp: 450 mL, Rfl: 0    hydrocortisone 2.5 % cream, Apply to the eyebrows daily if needed for scaling. Use no more than 2 weeks at a time. , Disp: 30 g, Rfl: 2    Azelastine-Fluticasone (DYMISTA NA), by Nasal route daily. , Disp: , Rfl:     Multiple Vitamin (MULTIVITAMIN PO), Take  by mouth daily. , Disp: , Rfl:     levocetirizine (XYZAL) 5 MG tablet, Take 5 mg by mouth nightly.  , Disp: , Rfl:     Cholecalciferol (VITAMIN D) 2000 UNIT TABS, Take  by mouth daily. , Disp: 30 tablet, Rfl:     mometasone (ASMANEX) 220 MCG/INH inhaler, Inhale 1 puff into the lungs 2 times daily. , Disp: , Rfl:     montelukast (SINGULAIR) 10 MG TABS, Take 1 Tab by mouth daily. , Disp: , Rfl:     Patient Active Problem List   Diagnosis    GERD (gastroesophageal reflux disease)    Anxiety    Arthritis    Hiatal hernia    Vitamin D deficiency    Iron deficiency anemia    B12 deficiency    Asthma    GAYATHRI (obstructive sleep apnea)    Bilateral leg edema    Hearing disorder, sensorineural    Obesity (BMI 30-39. 9)    Venous insufficiency of both lower extremities    Folate deficiency       Review of Systems   HENT: Negative. Eyes: Negative. Respiratory: Negative. Cardiovascular: Negative. Gastrointestinal: Negative. Endocrine: Negative. Musculoskeletal: Negative. Neurological: Negative. Psychiatric/Behavioral: Negative. Physical Exam   Constitutional: She is oriented to person, place, and time. She appears well-developed and well-nourished. Eyes: Conjunctivae and EOM are normal.   Cardiovascular: Normal rate, regular rhythm and normal heart sounds. Exam reveals no gallop and no friction rub. No murmur heard. Pulmonary/Chest: Effort normal and breath sounds normal. No respiratory distress.  She has no [] Prevention of weight gain (150-250 minutes/week)           Behavior: [x] Motivational interviewing performed    [] Referral for counseling                         [x] Discussed strategies to overcome habits/challenges for focus         [] Stress management   [x] Stimulus control         [] Sleep hygiene      Reviewed:  [x] Nutrition and the importance of regular protein intake  [x] Hidden carbohydrate sources  [x] Alcohol use  [x] Tobacco use   [x] Drug use- Denies  [x] Importance of exercise and reducing sedentary time  [x] Treatment consent- Patient understands and agrees with the treatment plan   [x] Proper use of medication and side effects          Key dietary points:    - Meats (preferably organic or grass fed) are great sources of protein and have no carbohydrates. - Recommend coconut, olive, avocado, or almond oils. - When buying dairy, choose regular or full fat options. - Choose vegetables that grow above ground as they are generally lower in carbohydrates and higher in fiber.  - Avoid starches such as bread, rice, potatoes, pasta and all sources of simple sugars (desserts, soda, breakfast cereals). - Choose beverages that are calorie and sugar free. No orders of the defined types were placed in this encounter. No Follow-up on file. This dictation was performed with a verbal recognition program (Dragon) and all efforts were made to ensure accuracy of this dictation. It is possible that there are still dictated errors within this note. If so, please bring any errors to my attention for correction.

## 2018-02-12 NOTE — PATIENT INSTRUCTIONS
changes in your eating habits. Instead of a diet, focus on lifestyle changes that will improve your health and achieve the right balance of energy and calories. To lose weight, you need to burn more calories than you take in. You can do it by eating healthy foods in reasonable amounts and becoming more active, even a little bit every day. Making small changes over time can add up to a lot. Make a plan for change. Many people have found that naming their reasons for change and staying focused on their plan can make a big difference. Work with your doctor to create a plan that is right for you. · Ask yourself: Faustina Lopez are my personal, most powerful reasons for wanting this change? What will my life look like when I've made the change? \"  · Set your long-term goal. Make it specific, such as \"I will lose x pounds. \"  · Break your long-term goal into smaller, short-term goals. Make these small steps specific and within your reach, things you know you can do. These steps are what keep you going from day to day. How can you stay on your plan for change? Be ready. Choose to start during a time when there are few events that might trigger slip-ups, like holidays, social events, and high-stress periods. Decide on your first few steps. Most people have more success when they make small changes, one step at a time. For example, you might switch a daily candy bar to a piece of fruit, walk 10 minutes more, or add more vegetables to a meal.  Line up your support people. Make sure you're not going to be alone as you make this change. Connect with people who understand how important it is to you. Ask family members and friends for help in keeping with your plan. And think about who could make it harder for you, and how to handle them. Try tracking. People who keep track of what they eat, feel, and do are better at losing weight. Try writing down things like:  · What and how much you eat.   · How you feel before and after each

## 2018-02-14 ASSESSMENT — ENCOUNTER SYMPTOMS
GASTROINTESTINAL NEGATIVE: 1
RESPIRATORY NEGATIVE: 1
EYES NEGATIVE: 1

## 2018-05-25 ENCOUNTER — OFFICE VISIT (OUTPATIENT)
Dept: INTERNAL MEDICINE CLINIC | Age: 58
End: 2018-05-25

## 2018-05-25 VITALS — SYSTOLIC BLOOD PRESSURE: 115 MMHG | DIASTOLIC BLOOD PRESSURE: 70 MMHG

## 2018-05-25 DIAGNOSIS — M77.8 HAND TENDONITIS: Primary | ICD-10-CM

## 2018-05-25 DIAGNOSIS — S66.809A INJURY OF FLEXOR TENDON OF HAND, UNSPECIFIED LATERALITY, INITIAL ENCOUNTER: ICD-10-CM

## 2018-05-25 PROCEDURE — 99213 OFFICE O/P EST LOW 20 MIN: CPT | Performed by: INTERNAL MEDICINE

## 2018-05-25 RX ORDER — NAPROXEN 500 MG/1
500 TABLET ORAL 2 TIMES DAILY WITH MEALS
Qty: 30 TABLET | Refills: 1 | Status: SHIPPED | OUTPATIENT
Start: 2018-05-25 | End: 2018-07-06 | Stop reason: ALTCHOICE

## 2018-05-29 ENCOUNTER — OFFICE VISIT (OUTPATIENT)
Dept: INTERNAL MEDICINE CLINIC | Age: 58
End: 2018-05-29

## 2018-05-29 VITALS
WEIGHT: 209 LBS | SYSTOLIC BLOOD PRESSURE: 120 MMHG | HEIGHT: 62 IN | RESPIRATION RATE: 12 BRPM | DIASTOLIC BLOOD PRESSURE: 76 MMHG | HEART RATE: 74 BPM | BODY MASS INDEX: 38.46 KG/M2

## 2018-05-29 DIAGNOSIS — Z23 NEED FOR SHINGLES VACCINE: ICD-10-CM

## 2018-05-29 DIAGNOSIS — E53.8 FOLATE DEFICIENCY: ICD-10-CM

## 2018-05-29 DIAGNOSIS — E55.9 VITAMIN D DEFICIENCY: ICD-10-CM

## 2018-05-29 DIAGNOSIS — E53.8 B12 DEFICIENCY: ICD-10-CM

## 2018-05-29 DIAGNOSIS — Z00.00 ANNUAL PHYSICAL EXAM: Primary | ICD-10-CM

## 2018-05-29 DIAGNOSIS — E66.9 OBESITY (BMI 30-39.9): ICD-10-CM

## 2018-05-29 LAB
A/G RATIO: 1.7 (ref 1.1–2.2)
ALBUMIN SERPL-MCNC: 4.2 G/DL (ref 3.4–5)
ALP BLD-CCNC: 80 U/L (ref 40–129)
ALT SERPL-CCNC: 24 U/L (ref 10–40)
ANION GAP SERPL CALCULATED.3IONS-SCNC: 15 MMOL/L (ref 3–16)
AST SERPL-CCNC: 23 U/L (ref 15–37)
BASOPHILS ABSOLUTE: 0 K/UL (ref 0–0.2)
BASOPHILS RELATIVE PERCENT: 0.5 %
BILIRUB SERPL-MCNC: <0.2 MG/DL (ref 0–1)
BUN BLDV-MCNC: 17 MG/DL (ref 7–20)
CALCIUM SERPL-MCNC: 9.5 MG/DL (ref 8.3–10.6)
CHLORIDE BLD-SCNC: 103 MMOL/L (ref 99–110)
CHOLESTEROL, TOTAL: 172 MG/DL (ref 0–199)
CO2: 23 MMOL/L (ref 21–32)
CREAT SERPL-MCNC: 0.5 MG/DL (ref 0.6–1.1)
EOSINOPHILS ABSOLUTE: 0.1 K/UL (ref 0–0.6)
EOSINOPHILS RELATIVE PERCENT: 3.4 %
FOLATE: 5.45 NG/ML (ref 4.78–24.2)
GFR AFRICAN AMERICAN: >60
GFR NON-AFRICAN AMERICAN: >60
GLOBULIN: 2.5 G/DL
GLUCOSE BLD-MCNC: 87 MG/DL (ref 70–99)
HCT VFR BLD CALC: 38.8 % (ref 36–48)
HDLC SERPL-MCNC: 59 MG/DL (ref 40–60)
HEMOGLOBIN: 12.7 G/DL (ref 12–16)
LDL CHOLESTEROL CALCULATED: 99 MG/DL
LYMPHOCYTES ABSOLUTE: 1.6 K/UL (ref 1–5.1)
LYMPHOCYTES RELATIVE PERCENT: 39.1 %
MCH RBC QN AUTO: 29.6 PG (ref 26–34)
MCHC RBC AUTO-ENTMCNC: 32.8 G/DL (ref 31–36)
MCV RBC AUTO: 90.4 FL (ref 80–100)
MONOCYTES ABSOLUTE: 0.4 K/UL (ref 0–1.3)
MONOCYTES RELATIVE PERCENT: 9.4 %
NEUTROPHILS ABSOLUTE: 2 K/UL (ref 1.7–7.7)
NEUTROPHILS RELATIVE PERCENT: 47.6 %
PDW BLD-RTO: 15.4 % (ref 12.4–15.4)
PLATELET # BLD: 345 K/UL (ref 135–450)
PMV BLD AUTO: 9 FL (ref 5–10.5)
POTASSIUM SERPL-SCNC: 4.4 MMOL/L (ref 3.5–5.1)
RBC # BLD: 4.29 M/UL (ref 4–5.2)
SODIUM BLD-SCNC: 141 MMOL/L (ref 136–145)
TOTAL PROTEIN: 6.7 G/DL (ref 6.4–8.2)
TRIGL SERPL-MCNC: 69 MG/DL (ref 0–150)
TSH SERPL DL<=0.05 MIU/L-ACNC: 1.41 UIU/ML (ref 0.27–4.2)
VITAMIN B-12: 180 PG/ML (ref 211–911)
VITAMIN D 25-HYDROXY: 45.4 NG/ML
VLDLC SERPL CALC-MCNC: 14 MG/DL
WBC # BLD: 4.2 K/UL (ref 4–11)

## 2018-05-29 PROCEDURE — 99396 PREV VISIT EST AGE 40-64: CPT | Performed by: INTERNAL MEDICINE

## 2018-05-29 ASSESSMENT — PATIENT HEALTH QUESTIONNAIRE - PHQ9
SUM OF ALL RESPONSES TO PHQ QUESTIONS 1-9: 0
SUM OF ALL RESPONSES TO PHQ9 QUESTIONS 1 & 2: 0
1. LITTLE INTEREST OR PLEASURE IN DOING THINGS: 0
2. FEELING DOWN, DEPRESSED OR HOPELESS: 0

## 2018-05-30 DIAGNOSIS — E53.8 FOLATE DEFICIENCY: Primary | ICD-10-CM

## 2018-05-30 RX ORDER — FOLIC ACID 1 MG/1
1 TABLET ORAL DAILY
Qty: 30 TABLET | Refills: 11 | Status: SHIPPED | OUTPATIENT
Start: 2018-05-30 | End: 2019-02-11 | Stop reason: SDUPTHER

## 2018-06-27 ENCOUNTER — NURSE ONLY (OUTPATIENT)
Dept: INTERNAL MEDICINE CLINIC | Age: 58
End: 2018-06-27

## 2018-06-27 DIAGNOSIS — E53.8 B12 DEFICIENCY: Primary | ICD-10-CM

## 2018-06-27 PROCEDURE — 96372 THER/PROPH/DIAG INJ SC/IM: CPT | Performed by: INTERNAL MEDICINE

## 2018-06-27 RX ORDER — CYANOCOBALAMIN 1000 UG/ML
1000 INJECTION INTRAMUSCULAR; SUBCUTANEOUS ONCE
Status: COMPLETED | OUTPATIENT
Start: 2018-06-27 | End: 2018-06-27

## 2018-06-27 RX ADMIN — CYANOCOBALAMIN 1000 MCG: 1000 INJECTION INTRAMUSCULAR; SUBCUTANEOUS at 09:08

## 2018-07-06 ENCOUNTER — OFFICE VISIT (OUTPATIENT)
Dept: INTERNAL MEDICINE CLINIC | Age: 58
End: 2018-07-06

## 2018-07-06 VITALS
RESPIRATION RATE: 14 BRPM | WEIGHT: 213.2 LBS | SYSTOLIC BLOOD PRESSURE: 134 MMHG | DIASTOLIC BLOOD PRESSURE: 80 MMHG | BODY MASS INDEX: 38.99 KG/M2 | HEART RATE: 72 BPM

## 2018-07-06 DIAGNOSIS — E53.8 B12 DEFICIENCY: ICD-10-CM

## 2018-07-06 DIAGNOSIS — R07.89 ATYPICAL CHEST PAIN: Primary | ICD-10-CM

## 2018-07-06 DIAGNOSIS — K21.9 GASTROESOPHAGEAL REFLUX DISEASE WITHOUT ESOPHAGITIS: ICD-10-CM

## 2018-07-06 DIAGNOSIS — K44.9 HIATAL HERNIA: ICD-10-CM

## 2018-07-06 PROCEDURE — 96372 THER/PROPH/DIAG INJ SC/IM: CPT | Performed by: INTERNAL MEDICINE

## 2018-07-06 PROCEDURE — 99214 OFFICE O/P EST MOD 30 MIN: CPT | Performed by: INTERNAL MEDICINE

## 2018-07-06 PROCEDURE — 1111F DSCHRG MED/CURRENT MED MERGE: CPT | Performed by: INTERNAL MEDICINE

## 2018-07-06 RX ORDER — CYANOCOBALAMIN 1000 UG/ML
1000 INJECTION INTRAMUSCULAR; SUBCUTANEOUS ONCE
Status: COMPLETED | OUTPATIENT
Start: 2018-07-06 | End: 2018-07-06

## 2018-07-06 RX ADMIN — CYANOCOBALAMIN 1000 MCG: 1000 INJECTION INTRAMUSCULAR; SUBCUTANEOUS at 10:37

## 2018-07-06 NOTE — PROGRESS NOTES
[facility]) Physicians  Internal Medicine  Patient Encounter  Beatriz Patterson D.O., [facility]        Chief Complaint   Patient presents with    Follow-up     was seen in [facility] 6/28/18 for Chest Pain        HPI: 62 y.o. female seen today status post ER visit follow-up. Patient visited [facility] ER on 6/29/2018 with complaint of chest pain. He ER record is reviewed [facility] of the electronic health record. Patient located the discomfort in the midsternal region and the right side of her chest.  The pain exacerbated with deep inspiration. She also had some indigestion symptoms. She had recently flown to [facility] and back. She's had no prior history of PE. She does have a history of venous insufficiency. Her EKG was negative without ischemic changes. In the emergency room her d-dimer was negative. Thus, a CT  was not obtained. Lab also showed negative troponins. She was given 2 doses of Morphine and Zofran. Lidocaine slurry did help the pain. Pt states her symptoms started the day she presented. She actually denies right or left sided chest pain. She felt the pain in the middle of her back. She did have pleuritic pain-- worsened when taking a deep breath. The paon also worsened with twisting or any other movements-- sitting up, walking. She felt better laying flat on her back. She denies any particular injury. She does have a hiatal hernia. She has not had problems with reflux with Gluten free diet. She uses Naproxen off and on. She is feeling better and denies pain. If twisting, she can feel some pulling discomfort. She denies any further pleuritic type discomfort. She denies any shortness of breath, fever, chills, sweats, abdominal pain, nausea, diarrhea.      Past Medical History:   Diagnosis Date    Allergic rhinitis     Anxiety     Asthma     Depression     Gastritis     GERD (gastroesophageal reflux disease)     Hiatal hernia     Iron defic anemia voiced understanding of their use, indication and potential side effects. Pt also understands the above recommendations. All questions answered.

## 2018-07-06 NOTE — PATIENT INSTRUCTIONS
an over-the-counter medicine. · Rest and protect the sore area. · Stop, change, or take a break from any activity that may be causing your pain or soreness. · Put ice or a cold pack on the sore area for 10 to 20 minutes at a time. Try to do this every 1 to 2 hours for the next 3 days (when you are awake) or until the swelling goes down. Put a thin cloth between the ice and your skin. · After 2 or 3 days, apply a heating pad set on low or a warm cloth to the area that hurts. Some doctors suggest that you go back and forth between hot and cold. · Do not wrap or tape your ribs for support. This may cause you to take smaller breaths, which could increase your risk of lung problems. · Mentholated creams such as Bengay or Icy Hot may soothe sore muscles. Follow the instructions on the package. · Follow your doctor's instructions for exercising. · Gentle stretching and massage may help you get better faster. Stretch slowly to the point just before pain begins, and hold the stretch for at least 15 to 30 seconds. Do this 3 or 4 times a day. Stretch just after you have applied heat. · As your pain gets better, slowly return to your normal activities. Any increased pain may be a sign that you need to rest a while longer. When should you call for help? Call 911 anytime you think you may need emergency care. For example, call if:    · You have chest pain or pressure. This may occur with:  ¨ Sweating. ¨ Shortness of breath. ¨ Nausea or vomiting. ¨ Pain that spreads from the chest to the neck, jaw, or one or both shoulders or arms. ¨ Dizziness or lightheadedness. ¨ A fast or uneven pulse. After calling 911, chew 1 adult-strength aspirin. Wait for an ambulance.  Do not try to drive yourself.     · You have sudden chest pain and shortness of breath, or you cough up blood.    Call your doctor now or seek immediate medical care if:    · You have any trouble breathing.     · Your chest pain gets worse.     · Your chest Reflux Disease (GERD): Care Instructions. \"     If you do not have an account, please click on the \"Sign Up Now\" link. Current as of: May 12, 2017  Content Version: 11.6  © 1164-0872 [employer]. Care instructions adapted under license by [facility] ([facility]). If you have questions about a medical condition or this instruction, always ask your healthcare professional. [address] any warranty or liability for your use of this information. Patient Education        Hiatal Hernia: Care Instructions  Your Care Instructions  A hiatal hernia occurs when part of the stomach bulges into the chest cavity. A hiatal hernia may allow stomach acid and juices to back up into the esophagus (acid reflux). This can cause a feeling of burning, warmth, heat, or pain behind the breastbone. This feeling may often occur after you eat, soon after you lie down, or when you bend forward, and it may come and go. You also may have a sour taste in your mouth. These symptoms are commonly known as heartburn or reflux. But not all hiatal hernias cause symptoms. Follow-up care is a key part of your treatment and safety. Be sure to make and go to all appointments, and call your doctor if you are having problems. It's also a good idea to know your test results and keep a list of the medicines you take. How can you care for yourself at home? · Take your medicines exactly as prescribed. Call your doctor if you think you are having a problem with your medicine. · Do not take aspirin or other nonsteroidal anti-inflammatory drugs (NSAIDs), such as ibuprofen (Advil, Motrin) or naproxen (Aleve), unless your doctor says it is okay. Ask your doctor what you can take for pain. · Your doctor may recommend over-the-counter medicine. For mild or occasional indigestion, antacids such as Tums, Gaviscon, Maalox, or Mylanta may help.  Your doctor also may recommend over-the-counter acid reducers, such as famotidine (Pepcid AC), license by [facility] ([facility]). If you have questions about a medical condition or this instruction, always ask your healthcare professional. [place] [address] any warranty or liability for your use of this information.

## 2018-07-13 ENCOUNTER — NURSE ONLY (OUTPATIENT)
Dept: INTERNAL MEDICINE CLINIC | Age: 58
End: 2018-07-13

## 2018-07-13 DIAGNOSIS — E53.8 B12 DEFICIENCY: Primary | ICD-10-CM

## 2018-07-13 PROCEDURE — 96372 THER/PROPH/DIAG INJ SC/IM: CPT | Performed by: INTERNAL MEDICINE

## 2018-07-13 RX ORDER — CYANOCOBALAMIN 1000 UG/ML
1000 INJECTION INTRAMUSCULAR; SUBCUTANEOUS ONCE
Status: COMPLETED | OUTPATIENT
Start: 2018-07-13 | End: 2018-07-13

## 2018-07-13 RX ADMIN — CYANOCOBALAMIN 1000 MCG: 1000 INJECTION INTRAMUSCULAR; SUBCUTANEOUS at 09:56

## 2018-08-17 ENCOUNTER — NURSE ONLY (OUTPATIENT)
Dept: INTERNAL MEDICINE CLINIC | Age: 58
End: 2018-08-17

## 2018-08-17 DIAGNOSIS — E53.8 B12 DEFICIENCY: Primary | ICD-10-CM

## 2018-08-17 PROCEDURE — 96372 THER/PROPH/DIAG INJ SC/IM: CPT | Performed by: INTERNAL MEDICINE

## 2018-08-17 RX ORDER — CYANOCOBALAMIN 1000 UG/ML
1000 INJECTION INTRAMUSCULAR; SUBCUTANEOUS ONCE
Status: COMPLETED | OUTPATIENT
Start: 2018-08-17 | End: 2018-08-17

## 2018-08-17 RX ADMIN — CYANOCOBALAMIN 1000 MCG: 1000 INJECTION INTRAMUSCULAR; SUBCUTANEOUS at 09:08

## 2018-09-18 ENCOUNTER — OFFICE VISIT (OUTPATIENT)
Dept: INTERNAL MEDICINE CLINIC | Age: 58
End: 2018-09-18

## 2018-09-18 VITALS
RESPIRATION RATE: 12 BRPM | SYSTOLIC BLOOD PRESSURE: 106 MMHG | HEART RATE: 84 BPM | TEMPERATURE: 98.4 F | DIASTOLIC BLOOD PRESSURE: 70 MMHG | OXYGEN SATURATION: 94 %

## 2018-09-18 DIAGNOSIS — R11.2 NON-INTRACTABLE VOMITING WITH NAUSEA, UNSPECIFIED VOMITING TYPE: ICD-10-CM

## 2018-09-18 DIAGNOSIS — R10.33 PERIUMBILICAL ABDOMINAL PAIN: ICD-10-CM

## 2018-09-18 DIAGNOSIS — B34.9 VIRAL SYNDROME: Primary | ICD-10-CM

## 2018-09-18 LAB
A/G RATIO: 1.7 (ref 1.1–2.2)
ALBUMIN SERPL-MCNC: 4.3 G/DL (ref 3.4–5)
ALP BLD-CCNC: 97 U/L (ref 40–129)
ALT SERPL-CCNC: 30 U/L (ref 10–40)
AMYLASE: 76 U/L (ref 25–115)
ANION GAP SERPL CALCULATED.3IONS-SCNC: 11 MMOL/L (ref 3–16)
AST SERPL-CCNC: 28 U/L (ref 15–37)
BASOPHILS ABSOLUTE: 0 K/UL (ref 0–0.2)
BASOPHILS RELATIVE PERCENT: 0.3 %
BILIRUB SERPL-MCNC: 0.4 MG/DL (ref 0–1)
BILIRUBIN, POC: NORMAL
BLOOD URINE, POC: NORMAL
BUN BLDV-MCNC: 10 MG/DL (ref 7–20)
CALCIUM SERPL-MCNC: 9.7 MG/DL (ref 8.3–10.6)
CHLORIDE BLD-SCNC: 103 MMOL/L (ref 99–110)
CLARITY, POC: CLEAR
CO2: 26 MMOL/L (ref 21–32)
COLOR, POC: YELLOW
CREAT SERPL-MCNC: 0.6 MG/DL (ref 0.6–1.1)
EOSINOPHILS ABSOLUTE: 0 K/UL (ref 0–0.6)
EOSINOPHILS RELATIVE PERCENT: 1 %
GFR AFRICAN AMERICAN: >60
GFR NON-AFRICAN AMERICAN: >60
GLOBULIN: 2.5 G/DL
GLUCOSE BLD-MCNC: 93 MG/DL (ref 70–99)
GLUCOSE URINE, POC: NORMAL
HCT VFR BLD CALC: 39.1 % (ref 36–48)
HEMOGLOBIN: 12.5 G/DL (ref 12–16)
KETONES, POC: NORMAL
LEUKOCYTE EST, POC: NORMAL
LIPASE: 25 U/L (ref 13–60)
LYMPHOCYTES ABSOLUTE: 0.8 K/UL (ref 1–5.1)
LYMPHOCYTES RELATIVE PERCENT: 20.3 %
MCH RBC QN AUTO: 28.9 PG (ref 26–34)
MCHC RBC AUTO-ENTMCNC: 32 G/DL (ref 31–36)
MCV RBC AUTO: 90.4 FL (ref 80–100)
MONOCYTES ABSOLUTE: 0.4 K/UL (ref 0–1.3)
MONOCYTES RELATIVE PERCENT: 9.7 %
NEUTROPHILS ABSOLUTE: 2.6 K/UL (ref 1.7–7.7)
NEUTROPHILS RELATIVE PERCENT: 68.7 %
NITRITE, POC: NORMAL
PDW BLD-RTO: 14.6 % (ref 12.4–15.4)
PH, POC: 5
PLATELET # BLD: 322 K/UL (ref 135–450)
PMV BLD AUTO: 9 FL (ref 5–10.5)
POTASSIUM SERPL-SCNC: 4 MMOL/L (ref 3.5–5.1)
PROTEIN, POC: NORMAL
RBC # BLD: 4.32 M/UL (ref 4–5.2)
SODIUM BLD-SCNC: 140 MMOL/L (ref 136–145)
SPECIFIC GRAVITY, POC: 1
TOTAL PROTEIN: 6.8 G/DL (ref 6.4–8.2)
UROBILINOGEN, POC: 0.2
WBC # BLD: 3.8 K/UL (ref 4–11)

## 2018-09-18 PROCEDURE — 99213 OFFICE O/P EST LOW 20 MIN: CPT | Performed by: INTERNAL MEDICINE

## 2018-09-18 PROCEDURE — 96372 THER/PROPH/DIAG INJ SC/IM: CPT | Performed by: INTERNAL MEDICINE

## 2018-09-18 PROCEDURE — 81002 URINALYSIS NONAUTO W/O SCOPE: CPT | Performed by: INTERNAL MEDICINE

## 2018-09-18 RX ORDER — PROMETHAZINE HYDROCHLORIDE 25 MG/ML
25 INJECTION, SOLUTION INTRAMUSCULAR; INTRAVENOUS ONCE
Status: COMPLETED | OUTPATIENT
Start: 2018-09-18 | End: 2018-09-18

## 2018-09-18 RX ADMIN — PROMETHAZINE HYDROCHLORIDE 25 MG: 25 INJECTION, SOLUTION INTRAMUSCULAR; INTRAVENOUS at 11:35

## 2018-09-18 NOTE — PATIENT INSTRUCTIONS
Push fluids    Monitor temperature. You can use Tylenol for headache or fever    Advised to call or return to the office if there are further questions, or if these symptoms fail to improve as anticipated or worsen. If you worsen please proceed to the emergency room      Patient Education        Abdominal Pain: Care Instructions  Your Care Instructions    Abdominal pain has many possible causes. Some aren't serious and get better on their own in a few days. Others need more testing and treatment. If your pain continues or gets worse, you need to be rechecked and may need more tests to find out what is wrong. You may need surgery to correct the problem. Don't ignore new symptoms, such as fever, nausea and vomiting, urination problems, pain that gets worse, and dizziness. These may be signs of a more serious problem. Your doctor may have recommended a follow-up visit in the next 8 to 12 hours. If you are not getting better, you may need more tests or treatment. The doctor has checked you carefully, but problems can develop later. If you notice any problems or new symptoms, get medical treatment right away. Follow-up care is a key part of your treatment and safety. Be sure to make and go to all appointments, and call your doctor if you are having problems. It's also a good idea to know your test results and keep a list of the medicines you take. How can you care for yourself at home? · Rest until you feel better. · To prevent dehydration, drink plenty of fluids, enough so that your urine is light yellow or clear like water. Choose water and other caffeine-free clear liquids until you feel better. If you have kidney, heart, or liver disease and have to limit fluids, talk with your doctor before you increase the amount of fluids you drink. · If your stomach is upset, eat mild foods, such as rice, dry toast or crackers, bananas, and applesauce.  Try eating several small meals instead of two or three large ones.  · Wait until 48 hours after all symptoms have gone away before you have spicy foods, alcohol, and drinks that contain caffeine. · Do not eat foods that are high in fat. · Avoid anti-inflammatory medicines such as aspirin, ibuprofen (Advil, Motrin), and naproxen (Aleve). These can cause stomach upset. Talk to your doctor if you take daily aspirin for another health problem. When should you call for help? Call 911 anytime you think you may need emergency care. For example, call if:    · You passed out (lost consciousness).     · You pass maroon or very bloody stools.     · You vomit blood or what looks like coffee grounds.     · You have new, severe belly pain.    Call your doctor now or seek immediate medical care if:    · Your pain gets worse, especially if it becomes focused in one area of your belly.     · You have a new or higher fever.     · Your stools are black and look like tar, or they have streaks of blood.     · You have unexpected vaginal bleeding.     · You have symptoms of a urinary tract infection. These may include:  ¨ Pain when you urinate. ¨ Urinating more often than usual.  ¨ Blood in your urine.     · You are dizzy or lightheaded, or you feel like you may faint.    Watch closely for changes in your health, and be sure to contact your doctor if:    · You are not getting better after 1 day (24 hours). Where can you learn more? Go to [email or web address] [email or web address] org and sign in to your Crossb[employer] account. Enter W372 in the [facility] box to learn more about \"Abdominal Pain: Care Instructions. \"     If you do not have an account, please click on the \"Sign Up Now\" link. Current as of: November 20, 2017  Content Version: 11.7  © 8578-6249 [employer]log, [employer]. Care instructions adapted under license by [facility] ([facility]).  If you have questions about a medical condition or this instruction, always ask your healthcare professional. Rahul Chapman any warranty or liability for your use of this information. Patient Education        Nausea and Vomiting: Care Instructions  Your Care Instructions    When you are nauseated, you may feel weak and sweaty and notice a lot of saliva in your mouth. Nausea often leads to vomiting. Most of the time you do not need to worry about nausea and vomiting, but they can be signs of other illnesses. Two common causes of nausea and vomiting are stomach flu and food poisoning. Nausea and vomiting from viral stomach flu will usually start to improve within 24 hours. Nausea and vomiting from food poisoning may last from 12 to 48 hours. The doctor has checked you carefully, but problems can develop later. If you notice any problems or new symptoms, get medical treatment right away. Follow-up care is a key part of your treatment and safety. Be sure to make and go to all appointments, and call your doctor if you are having problems. It's also a good idea to know your test results and keep a list of the medicines you take. How can you care for yourself at home? · To prevent dehydration, drink plenty of fluids, enough so that your urine is light yellow or clear like water. Choose water and other caffeine-free clear liquids until you feel better. If you have kidney, heart, or liver disease and have to limit fluids, talk with your doctor before you increase the amount of fluids you drink. · Rest in bed until you feel better. · When you are able to eat, try clear soups, mild foods, and liquids until all symptoms are gone for 12 to 48 hours. Other good choices include dry toast, crackers, cooked cereal, and gelatin dessert, such as Jell-O. When should you call for help? Call 911 anytime you think you may need emergency care. For example, call if:    · You passed out (lost consciousness).    Call your doctor now or seek immediate medical care if:    · You have symptoms of dehydration, such as:  ¨ Dry eyes and a dry mouth.   ¨ Passing only a little dark urine. ¨ Feeling thirstier than usual.     · You have new or worsening belly pain.     · You have a new or higher fever.     · You vomit blood or what looks like coffee grounds.    Watch closely for changes in your health, and be sure to contact your doctor if:    · You have ongoing nausea and vomiting.     · Your vomiting is getting worse.     · Your vomiting lasts longer than 2 days.     · You are not getting better as expected. Where can you learn more? Go to https://Benzinga.Polatis. org and sign in to your [facility] [facility] account. Enter 99 [id number] in the [employer] box to learn more about \"Nausea and Vomiting: Care Instructions. \"     If you do not have an account, please click on the \"Sign Up Now\" link. Current as of: November 20, 2017  Content Version: 11.7  © 8161-7317 [employer]. Care instructions adapted under license by [facility] ([facility]). If you have questions about a medical condition or this instruction, always ask your healthcare professional. [place] [address] any warranty or liability for your use of this information. Patient Education        Viral Infections: Care Instructions  Your Care Instructions    You don't feel well, but it's not clear what's causing it. You may have a viral infection. Viruses cause many illnesses, such as the common cold, influenza, fever, rashes, and the diarrhea, nausea, and vomiting that are often called \"stomach flu. \" You may wonder if antibiotic medicines could make you feel better. But antibiotics only treat infections caused by bacteria. They don't work on viruses. The good news is that viral infections usually aren't serious. Most will go away in a few days without medical treatment. In the meantime, there are a few things you can do to make yourself more comfortable. Follow-up care is a key part of your treatment and safety.  Be sure to make and go to all appointments, and call your doctor if you are

## 2018-09-18 NOTE — PROGRESS NOTES
by mouth daily  Nay Medrano APRN - CNP   potassium chloride (KLOR-CON M) 20 MEQ extended release tablet TAKE 1 TABLET BY MOUTH DAILY  River Figueroa DO   furosemide (LASIX) 40 MG tablet Take 1 tablet by mouth daily as needed (for swelling)  River Figueroa DO   ferrous sulfate 220 (44 FE) MG/5ML solution Take 5 mLs by mouth daily. River Figueroa DO   hydrocortisone 2.5 % cream Apply to the eyebrows daily if needed for scaling. Use no more than 2 weeks at a time. Minor Medina MD   Azelastine-Fluticasone (DYMISTA NA) by Nasal route daily. Historical Provider, MD   Multiple Vitamin (MULTIVITAMIN PO) Take  by mouth daily. Historical Provider, MD   levocetirizine (XYZAL) 5 MG tablet Take 5 mg by mouth nightly. Historical Provider, MD   Cholecalciferol (VITAMIN D) 2000 UNIT TABS Take  by mouth daily. Ceferino Munoz MD   [facility]) 220 MCG/INH inhaler Inhale 1 puff into the lungs 2 times daily. Historical Provider, MD   montelukast (SINGULAIR) 10 MG TABS Take 1 Tab by mouth daily. Historical Provider, MD           Review of Systems - As per HPI      OBJECTIVE:  /70   Pulse 84   Temp 98.4 °F (36.9 °C)   Resp 12   LMP  (LMP Unknown)   SpO2 94%   GEN: NAD, Nontoxic appearing. She is alert and oriented. HEENT: NC/AT, JUDY, EOMI, Oral cavity Clear,  TM's NL, Nasal cavity clear. NECK: Supple. No thyromegaly. No nuchal rigidity. LYMPH: No C/SC nodes. CV: S1 S2 NL, RRR. No murmurs, clicks or rubs. PULM: CTA, symmentric air exchange  EXT: No C/C/E  GI: Abdomen is soft, No guarding. She does have tenderness with palpation of the periumbilical region. No peritoneal signs. NEURO: No focal or lateralizing deficits. (-) Meningismus          ASSESSMENT[de-identified]  Tierney Garcia was seen today for nausea & vomiting and generalized body aches.     Diagnoses and all orders for this visit:    Viral syndrome    Non-intractable vomiting with nausea, unspecified vomiting type  -

## 2018-09-28 ENCOUNTER — NURSE ONLY (OUTPATIENT)
Dept: INTERNAL MEDICINE CLINIC | Age: 58
End: 2018-09-28
Payer: COMMERCIAL

## 2018-09-28 DIAGNOSIS — E53.8 B12 DEFICIENCY: Primary | ICD-10-CM

## 2018-09-28 PROCEDURE — 96372 THER/PROPH/DIAG INJ SC/IM: CPT | Performed by: INTERNAL MEDICINE

## 2018-09-28 RX ORDER — CYANOCOBALAMIN 1000 UG/ML
1000 INJECTION INTRAMUSCULAR; SUBCUTANEOUS ONCE
Status: COMPLETED | OUTPATIENT
Start: 2018-09-28 | End: 2018-09-28

## 2018-09-28 RX ADMIN — CYANOCOBALAMIN 1000 MCG: 1000 INJECTION INTRAMUSCULAR; SUBCUTANEOUS at 12:29

## 2018-10-26 ENCOUNTER — NURSE ONLY (OUTPATIENT)
Dept: INTERNAL MEDICINE CLINIC | Age: 58
End: 2018-10-26
Payer: COMMERCIAL

## 2018-10-26 DIAGNOSIS — E53.8 B12 DEFICIENCY: Primary | ICD-10-CM

## 2018-10-26 PROCEDURE — 96372 THER/PROPH/DIAG INJ SC/IM: CPT | Performed by: INTERNAL MEDICINE

## 2018-10-26 RX ORDER — CYANOCOBALAMIN 1000 UG/ML
1000 INJECTION INTRAMUSCULAR; SUBCUTANEOUS ONCE
Status: COMPLETED | OUTPATIENT
Start: 2018-10-26 | End: 2018-10-26

## 2018-10-26 RX ADMIN — CYANOCOBALAMIN 1000 MCG: 1000 INJECTION INTRAMUSCULAR; SUBCUTANEOUS at 09:57

## 2018-11-26 RX ORDER — DULOXETIN HYDROCHLORIDE 60 MG/1
CAPSULE, DELAYED RELEASE ORAL
Qty: 90 CAPSULE | Refills: 3 | Status: SHIPPED | OUTPATIENT
Start: 2018-11-26 | End: 2019-02-11 | Stop reason: SDUPTHER

## 2018-11-29 ENCOUNTER — OFFICE VISIT (OUTPATIENT)
Dept: INTERNAL MEDICINE CLINIC | Age: 58
End: 2018-11-29
Payer: COMMERCIAL

## 2018-11-29 VITALS
DIASTOLIC BLOOD PRESSURE: 76 MMHG | RESPIRATION RATE: 12 BRPM | HEART RATE: 78 BPM | SYSTOLIC BLOOD PRESSURE: 115 MMHG | WEIGHT: 216 LBS | BODY MASS INDEX: 39.75 KG/M2 | HEIGHT: 62 IN

## 2018-11-29 DIAGNOSIS — N32.81 OAB (OVERACTIVE BLADDER): ICD-10-CM

## 2018-11-29 DIAGNOSIS — M79.18 MYOFASCIAL PAIN: ICD-10-CM

## 2018-11-29 DIAGNOSIS — E55.9 VITAMIN D DEFICIENCY: ICD-10-CM

## 2018-11-29 DIAGNOSIS — G47.33 OSA (OBSTRUCTIVE SLEEP APNEA): ICD-10-CM

## 2018-11-29 DIAGNOSIS — M25.511 BILATERAL SHOULDER PAIN, UNSPECIFIED CHRONICITY: ICD-10-CM

## 2018-11-29 DIAGNOSIS — I87.2 VENOUS INSUFFICIENCY OF BOTH LOWER EXTREMITIES: ICD-10-CM

## 2018-11-29 DIAGNOSIS — J45.20 MILD INTERMITTENT ASTHMA WITHOUT COMPLICATION: ICD-10-CM

## 2018-11-29 DIAGNOSIS — E53.8 B12 DEFICIENCY: Primary | ICD-10-CM

## 2018-11-29 DIAGNOSIS — Z23 FLU VACCINE NEED: ICD-10-CM

## 2018-11-29 DIAGNOSIS — E53.8 FOLATE DEFICIENCY: ICD-10-CM

## 2018-11-29 DIAGNOSIS — M25.512 BILATERAL SHOULDER PAIN, UNSPECIFIED CHRONICITY: ICD-10-CM

## 2018-11-29 DIAGNOSIS — E66.9 OBESITY (BMI 30-39.9): ICD-10-CM

## 2018-11-29 DIAGNOSIS — F41.9 ANXIETY: ICD-10-CM

## 2018-11-29 PROCEDURE — 90686 IIV4 VACC NO PRSV 0.5 ML IM: CPT | Performed by: INTERNAL MEDICINE

## 2018-11-29 PROCEDURE — 90471 IMMUNIZATION ADMIN: CPT | Performed by: INTERNAL MEDICINE

## 2018-11-29 PROCEDURE — 96372 THER/PROPH/DIAG INJ SC/IM: CPT | Performed by: INTERNAL MEDICINE

## 2018-11-29 PROCEDURE — 99214 OFFICE O/P EST MOD 30 MIN: CPT | Performed by: INTERNAL MEDICINE

## 2018-11-29 RX ORDER — CYANOCOBALAMIN 1000 UG/ML
1000 INJECTION INTRAMUSCULAR; SUBCUTANEOUS ONCE
Status: COMPLETED | OUTPATIENT
Start: 2018-11-29 | End: 2018-11-29

## 2018-11-29 RX ORDER — OXYBUTYNIN CHLORIDE 10 MG/1
10 TABLET, EXTENDED RELEASE ORAL DAILY
Qty: 30 TABLET | Refills: 3 | Status: SHIPPED | OUTPATIENT
Start: 2018-11-29 | End: 2019-01-30 | Stop reason: SINTOL

## 2018-11-29 RX ADMIN — CYANOCOBALAMIN 1000 MCG: 1000 INJECTION INTRAMUSCULAR; SUBCUTANEOUS at 09:13

## 2018-11-29 NOTE — PATIENT INSTRUCTIONS
Dr. Abdifatah Villarreal at [facility]. You may also need to see a shoulder specialist  Patient Education        Musculoskeletal Pain: Care Instructions  Your Care Instructions    Different problems with the bones, muscles, nerves, ligaments, and tendons in the body can cause pain. One or more areas of your body may ache or burn. Or they may feel tired, stiff, or sore. The medical term for this type of pain is musculoskeletal pain. It can have many different causes. Sometimes the pain is caused by an injury such as a strain or sprain. Or you might have pain from using one part of your body in the same way over and over again. This is called overuse. In some cases, the cause of the pain is another health problem such as arthritis or fibromyalgia. The doctor will examine you and ask you questions about your health to help find the cause of your pain. Blood tests or imaging tests like an X-ray may also be helpful. But sometimes doctors can't find a cause of the pain. Treatment depends on your symptoms and the cause of the pain, if known. The doctor has checked you carefully, but problems can develop later. If you notice any problems or new symptoms, get medical treatment right away. Follow-up care is a key part of your treatment and safety. Be sure to make and go to all appointments, and call your doctor if you are having problems. It's also a good idea to know your test results and keep a list of the medicines you take. How can you care for yourself at home? · Rest until you feel better. · Do not do anything that makes the pain worse. Return to exercise gradually if you feel better and your doctor says it's okay. · Be safe with medicines. Read and follow all instructions on the label. ? If the doctor gave you a prescription medicine for pain, take it as prescribed. ? If you are not taking a prescription pain medicine, ask your doctor if you can take an over-the-counter medicine.   · Put ice or a cold pack on the area for 10 to 20 minutes at a time to ease pain. Put a thin cloth between the ice and your skin. When should you call for help? Call your doctor now or seek immediate medical care if:    · You have new pain, or your pain gets worse.     · You have new symptoms such as a fever, a rash, or chills.    Watch closely for changes in your health, and be sure to contact your doctor if:    · You do not get better as expected. Where can you learn more? Go to https://Beatpacking.Blue Egg. org and sign in to your [employer] account. Enter H122 in the [employer] box to learn more about \"Musculoskeletal Pain: Care Instructions. \"     If you do not have an account, please click on the \"Sign Up Now\" link. Current as of: June 4, 2018  Content Version: 11.8  © 4529-8138 Healthwise, Incorporated. Care instructions adapted under license by [facility] ([facility]). If you have questions about a medical condition or this instruction, always ask your healthcare professional. [address] any warranty or liability for your use of this information.

## 2018-12-26 ENCOUNTER — OFFICE VISIT (OUTPATIENT)
Dept: INTERNAL MEDICINE CLINIC | Age: 58
End: 2018-12-26
Payer: COMMERCIAL

## 2018-12-26 VITALS — SYSTOLIC BLOOD PRESSURE: 124 MMHG | DIASTOLIC BLOOD PRESSURE: 80 MMHG

## 2018-12-26 DIAGNOSIS — G89.29 CHRONIC NECK PAIN: Primary | ICD-10-CM

## 2018-12-26 DIAGNOSIS — M76.30 ILIOTIBIAL BAND SYNDROME, UNSPECIFIED LATERALITY: ICD-10-CM

## 2018-12-26 DIAGNOSIS — M54.50 BILATERAL LOW BACK PAIN WITHOUT SCIATICA, UNSPECIFIED CHRONICITY: ICD-10-CM

## 2018-12-26 DIAGNOSIS — M99.01 CERVICAL SOMATIC DYSFUNCTION: ICD-10-CM

## 2018-12-26 DIAGNOSIS — M50.30 DDD (DEGENERATIVE DISC DISEASE), CERVICAL: ICD-10-CM

## 2018-12-26 DIAGNOSIS — M54.2 CHRONIC NECK PAIN: Primary | ICD-10-CM

## 2018-12-26 DIAGNOSIS — E53.8 B12 DEFICIENCY: ICD-10-CM

## 2018-12-26 PROCEDURE — 99214 OFFICE O/P EST MOD 30 MIN: CPT | Performed by: INTERNAL MEDICINE

## 2018-12-26 RX ORDER — BACLOFEN 10 MG/1
10 TABLET ORAL 3 TIMES DAILY
Qty: 30 TABLET | Refills: 1 | Status: SHIPPED | OUTPATIENT
Start: 2018-12-26 | End: 2019-01-30 | Stop reason: SINTOL

## 2018-12-26 RX ORDER — CYANOCOBALAMIN 1000 UG/ML
1000 INJECTION INTRAMUSCULAR; SUBCUTANEOUS ONCE
Status: DISCONTINUED | OUTPATIENT
Start: 2018-12-26 | End: 2021-08-18

## 2018-12-26 RX ORDER — NAPROXEN 500 MG/1
500 TABLET ORAL 2 TIMES DAILY WITH MEALS
Qty: 60 TABLET | Refills: 0 | Status: SHIPPED | OUTPATIENT
Start: 2018-12-26 | End: 2019-05-21 | Stop reason: DRUGHIGH

## 2018-12-26 NOTE — PROGRESS NOTES
times daily. Historical Provider, MD   montelukast (SINGULAIR) 10 MG TABS Take 1 Tab by mouth daily. Historical Provider, MD           Review of Systems - As per HPI    OBJECTIVE:  /80   LMP  (LMP Unknown)   GEN: NAD, A&O, Non-toxic  NEURO: No focal or lateralizing deficits. VASC:  No carotid bruits. Pulses symmetric  MS: Left lower leg boot. Tenderness with palpation of the cervical paraspinals. Increased soft tissue tone and spasm noted. ROM is limited in all plains. Tenderness with palpation of the lumbar paraspinals. Increased soft tissue tone and spasm noted. ROM is full in the lumbar spine. Patient also has focal tenderness over both greater trochanteric regions and along the iliotibial band down to the lateral knees. ASSESSMENT[de-identified]  Harriet Almeida was seen today for pain. Diagnoses and all orders for this visit:    Chronic neck pain  -     naproxen (NAPROSYN) 500 MG tablet; Take 1 tablet by mouth 2 times daily (with meals)  -     baclofen (LIORESAL) 10 MG tablet; Take 1 tablet by mouth 3 times daily  -     [address] [place] [address]    DDD (degenerative disc disease), cervical  -     [facility]    Cervical somatic dysfunction  -     naproxen (NAPROSYN) 500 MG tablet; Take 1 tablet by mouth 2 times daily (with meals)  -     baclofen (LIORESAL) 10 MG tablet; Take 1 tablet by mouth 3 times daily  -     [facility]    Bilateral low back pain without sciatica, unspecified chronicity  -     naproxen (NAPROSYN) 500 MG tablet; Take 1 tablet by mouth 2 times daily (with meals)  -     baclofen (LIORESAL) 10 MG tablet; Take 1 tablet by mouth 3 times daily  -     XR LUMBAR SPINE (MIN 4 VIEWS); Future  -     [facility]    Iliotibial band syndrome, unspecified laterality  -     [facility]        Additional Plan:  1. Patient will need to have a lumbar x-ray  2.   She will need to get back into formal physical

## 2018-12-26 NOTE — PATIENT INSTRUCTIONS
your chair or desk is too high, use a foot rest to raise your knees. · When driving, keep your knees nearly level with your hips. Sit straight, and drive with both hands on the steering wheel. Your arms should be in a slightly bent position. · Try a kneeling chair, which helps tilt your hips forward. This takes pressure off your lower back. · Try sitting on an exercise ball. It can rock from side to side, which helps keep your back loose. Lift properly  · Squat down, bending at the hips and knees only. If you need to, put one knee to the floor and extend your other knee in front of you, bent at a right angle (half kneeling). · Press your chest straight forward. This helps keep your upper back straight while keeping a slight arch in your low back. · Hold the load as close to your body as possible, at the level of your navel. · Use your feet to change direction, taking small steps. · Lead with your hips as you change direction. Keep your shoulders in line with your hips as you move. Do not twist your body. · Set down your load carefully, squatting with your knees and hips only. When should you call for help? Watch closely for changes in your health, and be sure to contact your doctor if you have any problems. Where can you learn more? Go to [email or web address] and sign in to your [employer] account. Enter S810 in the [facility] box to learn more about \"Back Care and Preventing Injuries: Care Instructions. \"     If you do not have an account, please click on the \"Sign Up Now\" link. Current as of: November 29, 2017  Content Version: 11.8  © 1117-9298 Healthwise, Incorporated. Care instructions adapted under license by [facility] ([facility]). If you have questions about a medical condition or this instruction, always ask your healthcare professional. [place] [address] any warranty or liability for your use of this information.          Patient Education        Neck Pain:

## 2019-01-11 ENCOUNTER — HOSPITAL ENCOUNTER (OUTPATIENT)
Dept: WOMENS IMAGING | Age: 59
Discharge: HOME | End: 2019-01-11
Payer: COMMERCIAL

## 2019-01-11 DIAGNOSIS — Z12.39 BREAST CANCER SCREENING: ICD-10-CM

## 2019-01-11 PROCEDURE — 77063 BREAST TOMOSYNTHESIS BI: CPT

## 2019-01-11 PROCEDURE — 77067 SCR MAMMO BI INCL CAD: CPT

## 2019-01-16 ENCOUNTER — HOSPITAL ENCOUNTER (OUTPATIENT)
Dept: PHYSICAL THERAPY | Age: 59
Setting detail: THERAPIES SERIES
Discharge: HOME | End: 2019-01-16
Payer: COMMERCIAL

## 2019-01-16 PROCEDURE — 97162 PT EVAL MOD COMPLEX 30 MIN: CPT

## 2019-01-16 PROCEDURE — 97110 THERAPEUTIC EXERCISES: CPT

## 2019-01-16 ASSESSMENT — PAIN DESCRIPTION - FREQUENCY: FREQUENCY: CONTINUOUS

## 2019-01-16 ASSESSMENT — PAIN DESCRIPTION - PROGRESSION: CLINICAL_PROGRESSION: GRADUALLY IMPROVING

## 2019-01-16 ASSESSMENT — PAIN DESCRIPTION - PAIN TYPE: TYPE: CHRONIC PAIN

## 2019-01-16 ASSESSMENT — PAIN DESCRIPTION - DESCRIPTORS: DESCRIPTORS: ACHING;CRAMPING

## 2019-01-16 ASSESSMENT — PAIN SCALES - GENERAL: PAINLEVEL_OUTOF10: 4

## 2019-01-23 ENCOUNTER — HOSPITAL ENCOUNTER (OUTPATIENT)
Dept: PHYSICAL THERAPY | Age: 59
Setting detail: THERAPIES SERIES
Discharge: HOME | End: 2019-01-23
Payer: COMMERCIAL

## 2019-01-23 PROCEDURE — 97112 NEUROMUSCULAR REEDUCATION: CPT

## 2019-01-23 PROCEDURE — 97110 THERAPEUTIC EXERCISES: CPT

## 2019-01-23 PROCEDURE — 97140 MANUAL THERAPY 1/> REGIONS: CPT

## 2019-01-24 ENCOUNTER — HOSPITAL ENCOUNTER (OUTPATIENT)
Dept: PHYSICAL THERAPY | Age: 59
Setting detail: THERAPIES SERIES
Discharge: HOME | End: 2019-01-24
Payer: COMMERCIAL

## 2019-01-24 PROCEDURE — 97110 THERAPEUTIC EXERCISES: CPT

## 2019-01-24 PROCEDURE — 97140 MANUAL THERAPY 1/> REGIONS: CPT

## 2019-01-29 ENCOUNTER — HOSPITAL ENCOUNTER (OUTPATIENT)
Dept: PHYSICAL THERAPY | Age: 59
Setting detail: THERAPIES SERIES
Discharge: HOME | End: 2019-01-29
Payer: COMMERCIAL

## 2019-01-29 PROCEDURE — 97110 THERAPEUTIC EXERCISES: CPT

## 2019-01-29 PROCEDURE — 97112 NEUROMUSCULAR REEDUCATION: CPT

## 2019-01-29 PROCEDURE — 97140 MANUAL THERAPY 1/> REGIONS: CPT

## 2019-01-30 ENCOUNTER — HOSPITAL ENCOUNTER (OUTPATIENT)
Dept: PHYSICAL THERAPY | Age: 59
Setting detail: THERAPIES SERIES
Discharge: HOME | End: 2019-01-30
Payer: COMMERCIAL

## 2019-01-30 ENCOUNTER — OFFICE VISIT (OUTPATIENT)
Dept: INTERNAL MEDICINE CLINIC | Age: 59
End: 2019-01-30
Payer: COMMERCIAL

## 2019-01-30 VITALS — DIASTOLIC BLOOD PRESSURE: 80 MMHG | HEART RATE: 66 BPM | SYSTOLIC BLOOD PRESSURE: 120 MMHG

## 2019-01-30 DIAGNOSIS — M50.30 DDD (DEGENERATIVE DISC DISEASE), CERVICAL: Primary | ICD-10-CM

## 2019-01-30 DIAGNOSIS — M99.01 CERVICAL SOMATIC DYSFUNCTION: ICD-10-CM

## 2019-01-30 DIAGNOSIS — M76.30 ILIOTIBIAL BAND SYNDROME, UNSPECIFIED LATERALITY: ICD-10-CM

## 2019-01-30 DIAGNOSIS — N32.81 OAB (OVERACTIVE BLADDER): ICD-10-CM

## 2019-01-30 DIAGNOSIS — E53.8 B12 DEFICIENCY: ICD-10-CM

## 2019-01-30 PROCEDURE — 96372 THER/PROPH/DIAG INJ SC/IM: CPT | Performed by: INTERNAL MEDICINE

## 2019-01-30 PROCEDURE — 97140 MANUAL THERAPY 1/> REGIONS: CPT

## 2019-01-30 PROCEDURE — 97112 NEUROMUSCULAR REEDUCATION: CPT

## 2019-01-30 PROCEDURE — 97110 THERAPEUTIC EXERCISES: CPT

## 2019-01-30 PROCEDURE — 99213 OFFICE O/P EST LOW 20 MIN: CPT | Performed by: INTERNAL MEDICINE

## 2019-01-30 RX ORDER — TIZANIDINE 4 MG/1
4 TABLET ORAL NIGHTLY
Qty: 30 TABLET | Refills: 1 | Status: SHIPPED | OUTPATIENT
Start: 2019-01-30 | End: 2019-06-03 | Stop reason: SDUPTHER

## 2019-01-30 RX ORDER — CYANOCOBALAMIN 1000 UG/ML
1000 INJECTION INTRAMUSCULAR; SUBCUTANEOUS ONCE
Status: COMPLETED | OUTPATIENT
Start: 2019-01-30 | End: 2019-01-30

## 2019-01-30 RX ADMIN — CYANOCOBALAMIN 1000 MCG: 1000 INJECTION INTRAMUSCULAR; SUBCUTANEOUS at 09:11

## 2019-01-31 ENCOUNTER — OFFICE VISIT (OUTPATIENT)
Dept: PULMONOLOGY | Age: 59
End: 2019-01-31
Payer: COMMERCIAL

## 2019-01-31 VITALS
SYSTOLIC BLOOD PRESSURE: 118 MMHG | DIASTOLIC BLOOD PRESSURE: 78 MMHG | HEART RATE: 88 BPM | OXYGEN SATURATION: 97 % | HEIGHT: 62 IN | BODY MASS INDEX: 39.93 KG/M2 | WEIGHT: 217 LBS

## 2019-01-31 DIAGNOSIS — G47.33 OBSTRUCTIVE SLEEP APNEA SYNDROME: Primary | ICD-10-CM

## 2019-01-31 DIAGNOSIS — J45.20 MILD INTERMITTENT ASTHMA WITHOUT COMPLICATION: Chronic | ICD-10-CM

## 2019-01-31 DIAGNOSIS — F41.9 ANXIETY: Chronic | ICD-10-CM

## 2019-01-31 DIAGNOSIS — K21.9 GASTROESOPHAGEAL REFLUX DISEASE WITHOUT ESOPHAGITIS: Chronic | ICD-10-CM

## 2019-01-31 DIAGNOSIS — E66.9 NON MORBID OBESITY, UNSPECIFIED OBESITY TYPE: Chronic | ICD-10-CM

## 2019-01-31 PROCEDURE — 99204 OFFICE O/P NEW MOD 45 MIN: CPT | Performed by: INTERNAL MEDICINE

## 2019-01-31 ASSESSMENT — ENCOUNTER SYMPTOMS
RHINORRHEA: 0
NAUSEA: 0
ABDOMINAL DISTENTION: 0
ALLERGIC/IMMUNOLOGIC NEGATIVE: 1
VOMITING: 0
CHOKING: 0
EYE PAIN: 0
CHEST TIGHTNESS: 0
APNEA: 0
PHOTOPHOBIA: 0
ABDOMINAL PAIN: 0
SHORTNESS OF BREATH: 0

## 2019-01-31 ASSESSMENT — SLEEP AND FATIGUE QUESTIONNAIRES
HOW LIKELY ARE YOU TO NOD OFF OR FALL ASLEEP WHILE SITTING INACTIVE IN A PUBLIC PLACE: 1
HOW LIKELY ARE YOU TO NOD OFF OR FALL ASLEEP IN A CAR, WHILE STOPPED FOR A FEW MINUTES IN TRAFFIC: 1
HOW LIKELY ARE YOU TO NOD OFF OR FALL ASLEEP WHILE LYING DOWN TO REST IN THE AFTERNOON WHEN CIRCUMSTANCES PERMIT: 3
HOW LIKELY ARE YOU TO NOD OFF OR FALL ASLEEP WHILE WATCHING TV: 2
HOW LIKELY ARE YOU TO NOD OFF OR FALL ASLEEP WHILE SITTING AND TALKING TO SOMEONE: 1
ESS TOTAL SCORE: 14
HOW LIKELY ARE YOU TO NOD OFF OR FALL ASLEEP WHILE SITTING AND READING: 2
HOW LIKELY ARE YOU TO NOD OFF OR FALL ASLEEP WHEN YOU ARE A PASSENGER IN A CAR FOR AN HOUR WITHOUT A BREAK: 3
HOW LIKELY ARE YOU TO NOD OFF OR FALL ASLEEP WHILE SITTING QUIETLY AFTER LUNCH WITHOUT ALCOHOL: 1

## 2019-02-04 ENCOUNTER — HOSPITAL ENCOUNTER (OUTPATIENT)
Dept: PHYSICAL THERAPY | Age: 59
Setting detail: THERAPIES SERIES
Discharge: HOME | End: 2019-02-04
Payer: COMMERCIAL

## 2019-02-04 PROCEDURE — 97140 MANUAL THERAPY 1/> REGIONS: CPT

## 2019-02-04 PROCEDURE — 97110 THERAPEUTIC EXERCISES: CPT

## 2019-02-04 PROCEDURE — 97112 NEUROMUSCULAR REEDUCATION: CPT

## 2019-02-06 ENCOUNTER — HOSPITAL ENCOUNTER (OUTPATIENT)
Dept: PHYSICAL THERAPY | Age: 59
Setting detail: THERAPIES SERIES
Discharge: HOME | End: 2019-02-06
Payer: COMMERCIAL

## 2019-02-07 ENCOUNTER — HOSPITAL ENCOUNTER (OUTPATIENT)
Dept: PHYSICAL THERAPY | Age: 59
Setting detail: THERAPIES SERIES
Discharge: HOME | End: 2019-02-07
Payer: COMMERCIAL

## 2019-02-07 PROCEDURE — 97112 NEUROMUSCULAR REEDUCATION: CPT

## 2019-02-07 PROCEDURE — 97110 THERAPEUTIC EXERCISES: CPT

## 2019-02-07 PROCEDURE — 97140 MANUAL THERAPY 1/> REGIONS: CPT

## 2019-02-11 ENCOUNTER — HOSPITAL ENCOUNTER (OUTPATIENT)
Dept: PHYSICAL THERAPY | Age: 59
Setting detail: THERAPIES SERIES
Discharge: HOME | End: 2019-02-11
Payer: COMMERCIAL

## 2019-02-11 DIAGNOSIS — E53.8 FOLATE DEFICIENCY: ICD-10-CM

## 2019-02-11 PROCEDURE — 97140 MANUAL THERAPY 1/> REGIONS: CPT

## 2019-02-11 PROCEDURE — 97112 NEUROMUSCULAR REEDUCATION: CPT

## 2019-02-11 PROCEDURE — 97110 THERAPEUTIC EXERCISES: CPT

## 2019-02-12 RX ORDER — DULOXETIN HYDROCHLORIDE 60 MG/1
CAPSULE, DELAYED RELEASE ORAL
Qty: 90 CAPSULE | Refills: 3 | Status: SHIPPED | OUTPATIENT
Start: 2019-02-12 | End: 2019-12-27 | Stop reason: SDUPTHER

## 2019-02-12 RX ORDER — FOLIC ACID 1 MG/1
1 TABLET ORAL DAILY
Qty: 90 TABLET | Refills: 3 | Status: SHIPPED | OUTPATIENT
Start: 2019-02-12 | End: 2021-08-18

## 2019-02-13 ENCOUNTER — HOSPITAL ENCOUNTER (OUTPATIENT)
Dept: PHYSICAL THERAPY | Age: 59
Setting detail: THERAPIES SERIES
Discharge: HOME | End: 2019-02-13
Payer: COMMERCIAL

## 2019-02-14 ENCOUNTER — HOSPITAL ENCOUNTER (OUTPATIENT)
Dept: PHYSICAL THERAPY | Age: 59
Setting detail: THERAPIES SERIES
Discharge: HOME | End: 2019-02-14
Payer: COMMERCIAL

## 2019-02-14 PROCEDURE — 97112 NEUROMUSCULAR REEDUCATION: CPT

## 2019-02-14 PROCEDURE — 97110 THERAPEUTIC EXERCISES: CPT

## 2019-02-14 PROCEDURE — 97140 MANUAL THERAPY 1/> REGIONS: CPT

## 2019-02-18 ENCOUNTER — APPOINTMENT (OUTPATIENT)
Dept: PHYSICAL THERAPY | Age: 59
End: 2019-02-18
Payer: COMMERCIAL

## 2019-02-20 ENCOUNTER — HOSPITAL ENCOUNTER (OUTPATIENT)
Dept: PHYSICAL THERAPY | Age: 59
Setting detail: THERAPIES SERIES
Discharge: HOME | End: 2019-02-20
Payer: COMMERCIAL

## 2019-02-20 PROCEDURE — 97140 MANUAL THERAPY 1/> REGIONS: CPT

## 2019-02-20 PROCEDURE — 97530 THERAPEUTIC ACTIVITIES: CPT

## 2019-02-20 PROCEDURE — 97112 NEUROMUSCULAR REEDUCATION: CPT

## 2019-02-20 PROCEDURE — 97110 THERAPEUTIC EXERCISES: CPT

## 2019-02-20 ASSESSMENT — PAIN DESCRIPTION - ORIENTATION: ORIENTATION: RIGHT;LEFT;LOWER;MID;UPPER

## 2019-02-20 ASSESSMENT — PAIN DESCRIPTION - PAIN TYPE: TYPE: CHRONIC PAIN

## 2019-02-20 ASSESSMENT — PAIN DESCRIPTION - FREQUENCY: FREQUENCY: CONTINUOUS

## 2019-02-20 ASSESSMENT — PAIN DESCRIPTION - DESCRIPTORS: DESCRIPTORS: ACHING;CRAMPING

## 2019-02-20 ASSESSMENT — PAIN DESCRIPTION - PROGRESSION: CLINICAL_PROGRESSION: GRADUALLY IMPROVING

## 2019-02-20 ASSESSMENT — PAIN SCALES - GENERAL: PAINLEVEL_OUTOF10: 3

## 2019-02-22 ENCOUNTER — HOSPITAL ENCOUNTER (OUTPATIENT)
Dept: PHYSICAL THERAPY | Age: 59
Setting detail: THERAPIES SERIES
Discharge: HOME | End: 2019-02-22
Payer: COMMERCIAL

## 2019-02-22 PROCEDURE — 97112 NEUROMUSCULAR REEDUCATION: CPT

## 2019-02-22 PROCEDURE — 97110 THERAPEUTIC EXERCISES: CPT

## 2019-02-22 PROCEDURE — 97140 MANUAL THERAPY 1/> REGIONS: CPT

## 2019-02-22 PROCEDURE — 97116 GAIT TRAINING THERAPY: CPT

## 2019-03-01 ENCOUNTER — HOSPITAL ENCOUNTER (OUTPATIENT)
Dept: PHYSICAL THERAPY | Age: 59
Setting detail: THERAPIES SERIES
Discharge: HOME | End: 2019-03-01
Payer: COMMERCIAL

## 2019-03-01 PROCEDURE — 97112 NEUROMUSCULAR REEDUCATION: CPT

## 2019-03-01 PROCEDURE — 97110 THERAPEUTIC EXERCISES: CPT

## 2019-03-01 PROCEDURE — 97140 MANUAL THERAPY 1/> REGIONS: CPT

## 2019-03-05 ENCOUNTER — HOSPITAL ENCOUNTER (OUTPATIENT)
Dept: PHYSICAL THERAPY | Age: 59
Setting detail: THERAPIES SERIES
Discharge: HOME | End: 2019-03-05
Payer: COMMERCIAL

## 2019-03-05 PROCEDURE — 97112 NEUROMUSCULAR REEDUCATION: CPT

## 2019-03-05 PROCEDURE — 97110 THERAPEUTIC EXERCISES: CPT

## 2019-03-05 PROCEDURE — 97140 MANUAL THERAPY 1/> REGIONS: CPT

## 2019-03-06 ENCOUNTER — HOSPITAL ENCOUNTER (OUTPATIENT)
Dept: PHYSICAL THERAPY | Age: 59
Setting detail: THERAPIES SERIES
Discharge: HOME | End: 2019-03-06
Payer: COMMERCIAL

## 2019-03-06 PROCEDURE — 97150 GROUP THERAPEUTIC PROCEDURES: CPT

## 2019-03-06 PROCEDURE — 97110 THERAPEUTIC EXERCISES: CPT

## 2019-03-06 PROCEDURE — 97112 NEUROMUSCULAR REEDUCATION: CPT

## 2019-03-06 PROCEDURE — 97140 MANUAL THERAPY 1/> REGIONS: CPT

## 2019-03-07 DIAGNOSIS — N32.81 OAB (OVERACTIVE BLADDER): ICD-10-CM

## 2019-03-08 ENCOUNTER — NURSE ONLY (OUTPATIENT)
Dept: INTERNAL MEDICINE CLINIC | Age: 59
End: 2019-03-08
Payer: COMMERCIAL

## 2019-03-08 PROCEDURE — 96372 THER/PROPH/DIAG INJ SC/IM: CPT | Performed by: INTERNAL MEDICINE

## 2019-03-08 RX ORDER — CYANOCOBALAMIN 1000 UG/ML
1000 INJECTION INTRAMUSCULAR; SUBCUTANEOUS ONCE
Status: COMPLETED | OUTPATIENT
Start: 2019-03-08 | End: 2019-03-08

## 2019-03-08 RX ORDER — MIRABEGRON 25 MG/1
TABLET, FILM COATED, EXTENDED RELEASE ORAL
Qty: 30 TABLET | Refills: 0 | Status: SHIPPED | OUTPATIENT
Start: 2019-03-08 | End: 2019-04-16 | Stop reason: SDUPTHER

## 2019-03-08 RX ADMIN — CYANOCOBALAMIN 1000 MCG: 1000 INJECTION INTRAMUSCULAR; SUBCUTANEOUS at 16:16

## 2019-03-13 ENCOUNTER — HOSPITAL ENCOUNTER (OUTPATIENT)
Dept: PHYSICAL THERAPY | Age: 59
Setting detail: THERAPIES SERIES
Discharge: HOME | End: 2019-03-13
Payer: COMMERCIAL

## 2019-03-13 PROCEDURE — 97110 THERAPEUTIC EXERCISES: CPT

## 2019-03-13 PROCEDURE — 97140 MANUAL THERAPY 1/> REGIONS: CPT

## 2019-03-13 PROCEDURE — 97035 APP MDLTY 1+ULTRASOUND EA 15: CPT

## 2019-03-14 ENCOUNTER — HOSPITAL ENCOUNTER (OUTPATIENT)
Dept: PHYSICAL THERAPY | Age: 59
Setting detail: THERAPIES SERIES
Discharge: HOME | End: 2019-03-14
Payer: COMMERCIAL

## 2019-03-14 PROCEDURE — 97110 THERAPEUTIC EXERCISES: CPT

## 2019-03-14 PROCEDURE — 97140 MANUAL THERAPY 1/> REGIONS: CPT

## 2019-03-14 PROCEDURE — 97035 APP MDLTY 1+ULTRASOUND EA 15: CPT

## 2019-03-19 ENCOUNTER — HOSPITAL ENCOUNTER (OUTPATIENT)
Dept: PHYSICAL THERAPY | Age: 59
Setting detail: THERAPIES SERIES
Discharge: HOME | End: 2019-03-19
Payer: COMMERCIAL

## 2019-03-19 PROCEDURE — 97140 MANUAL THERAPY 1/> REGIONS: CPT

## 2019-03-19 PROCEDURE — 97112 NEUROMUSCULAR REEDUCATION: CPT

## 2019-03-19 PROCEDURE — 97110 THERAPEUTIC EXERCISES: CPT

## 2019-03-19 PROCEDURE — 97530 THERAPEUTIC ACTIVITIES: CPT

## 2019-03-21 ENCOUNTER — HOSPITAL ENCOUNTER (OUTPATIENT)
Dept: PHYSICAL THERAPY | Age: 59
Setting detail: THERAPIES SERIES
Discharge: HOME | End: 2019-03-21
Payer: COMMERCIAL

## 2019-03-21 PROCEDURE — 97112 NEUROMUSCULAR REEDUCATION: CPT

## 2019-03-21 PROCEDURE — 97110 THERAPEUTIC EXERCISES: CPT

## 2019-03-21 PROCEDURE — 97140 MANUAL THERAPY 1/> REGIONS: CPT

## 2019-04-10 ENCOUNTER — HOSPITAL ENCOUNTER (OUTPATIENT)
Dept: PHYSICAL THERAPY | Age: 59
Setting detail: THERAPIES SERIES
Discharge: HOME | End: 2019-04-10
Payer: COMMERCIAL

## 2019-04-10 PROCEDURE — 97112 NEUROMUSCULAR REEDUCATION: CPT

## 2019-04-10 PROCEDURE — 97110 THERAPEUTIC EXERCISES: CPT

## 2019-04-10 PROCEDURE — 97140 MANUAL THERAPY 1/> REGIONS: CPT

## 2019-04-10 NOTE — FLOWSHEET NOTE
3    Outpatient Physical Therapy     Phone: [phone] Fax: [phone]         Physical Therapy Daily Treatment Note  Date:  4/10/2019 Late entry; Tx completed on     Patient Name:  Luz Iqbal    :  1960  MRN: [id number]  Restrictions/Precautions:    Medical/Treatment Diagnosis Information:   · Diagnosis: Chronic Neck Pain M54.2, Cervical DDD M50.30, Cervical Somatic Dysfunction M99,0, B LBP without sciatica M54.5, Iliotibial Band syndrome M54.6; L anterior tibialis Tendonitis  · Treatment Diagnosis: CervicalLumbar Postural Malalignments with core/scapular/LE Weakness limiting functional reacging/gait/stairs    Tracking Information:  Physician Information Referring Practitioner: Viktoriya James , [address] [place] [address] Sent Date: 19 Signed Received: 19,  Y    Visit Count / Total Visits + 0/6-8    Insurance Approved Visits   Approved Dates:     Insurance Information PT   Progress Note/G-codes   []  Yes   [x]  No Next Due: 20     Pain level: Lower neck/upper thoracic 2/10, 1/10 Lower back; L 2/10/,/R/L foot/pain ranges 3-4/10    Subjective  4/10:Casey returned from 2 week trip to [facility] last Thursday. She reports that she wore the boot 90% of the time due to rough terrain. Pain at worst was 6/10, but stayed primarily at a 3-4/10. Her back one day hurt severely. 3/21:Patient states her L ankle/foot feels overall better since her last PT session with pain levels 1/10. She will be gone for 2 weeks for [employer] trip. Patient instructed to take her bands, do her stretches, and take her walking boot just in case. Patient also instructed to only wear her supportive gym shoes with inserts as well. 3/19: See PN  3/14:Pain significantly decreased in top of foot but in bottom stuill bothering her. 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   She states that she got 3 sharp pains on top of her L foot and then it transitioned to a achy pain. Pain om top still this AM at a 3-4/10. She Patient reports on Monday she \"ran\"up to 2 flights of steps with heavy CPAP machine and was sore from that as well. 3/6: Patient reports minimal pain following yesterday's treatment session with additional new exercises. 3/5:Patient reports her l foot has been pretty good except for Sunday when she was walking around her house barefoot getting ready for [facility]. Her trip to [facility] is 20 days away. 3/1: Patient reports she got her new New Balance walking shoes and her feet feel much more supported with pain levels 1/10 or less,  She still feels a pull when pushing off her L foot under her big toe/arch.  :2/22: Patient reports feet are finally starting to feel better. R foot no pain. L foot pain the last day or so is across stop  of foot at the bend. 2/20:See PN; Pain on L foot along medial and plantar surface of 1st metatarsal and on arch. States still wearing orthotics (partial  2/14:Patient reports getting her POwersteps on Tuesday and has been wearing them almost all the time despite being told to wean into them. Patient showed PT the Powersteps and they are midl engrth. Patient reports after her last PT session she was really sore. 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 She said that she can walk faster now but she has increased lower back pain with walking faster. Pain in L foot is along inside arch of L foot. 2/7:She reports feet are a 6-7/10 even on the R one. She states she worked from her daughter's house today and had to help take care of her 2 year grandchildren and 2 month old infant [facility]. Neck/low back pain only uncomfortable today. 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   She states that her lower back is really sore today after walking fast to a meeting and standing the whole time. She also has significant tightness in neck/head. 1/30:Dr. Holly Victoriano her muscle relaxor today at Dr. Romeo Ryan office visit and told her to stay on Naproxen. MD also ordered x-rays for her cervical spine. After manual PT yesterday, her pain level in her was temporarily decreased to 3-4/10. L foot is starting to feel better except for walking fast or for walking longer than 15'. 1/29: Patient reports neck pain was really lowered to a 2/10 but after a few days the pain started to increase. She wemt to [facility] and walked 15-20' leaning on a cart. 1/24:Upper back/neck is biggest issue today with pain 8.5/10 but no HA today. Painful to perform scapular retraction. Feet are really sore as well./   1/19:Patient reports weaning out of boot on her L foot and presents to the clinic wearing gym shoes. She states L foot/pain ranges 4-6/10 since being out of the boot part of the time.   States she has walked upto 10' at a time so far in shoes only  1/16/18 See initial evaluation    Objective:   Observation:   3/19: See PN   3/14: Myofascial bands L anterior tibialis along 1st metatarsal and insertion   3/5: 15' late to PT visit  2/20: See PN  2/4: see manual section  1/23: pt. pResents to clinic wearing gym shoes   See initial evaluation  Test measurements1/23: Brief assessment of L ankle/foot t: .L ankle strength: peroneals 4+/5, posterior tibialis 3+/5 (pain), anterior tibialis 3/5 (no MMT with pain), L ankle DF 12, PF 45, rearfoot IN 38, rearfoot EV 2, gastroc 8; myofascial bands L gastroc/anterior tibialis), TTP anterior/posterior tib/medial longitudinal arch; decreased forefoot/ankle pronation :      Exercises:  Exercise/Equipment Resistance/Repetitions Other comments   Nustep/Stretch 5' Level 3 /L/R standing Hip flexor stretches 3-\" X2;     IB/ gastroc 30\" X3  L/R soleus 40\" X2 L/R  B heel raises 2X10    Mat     Hookline Hip Add isos 5\" X10N              UBE/Pulleys           HEP/NMR     Cables Parallel Bars/NMR Standing on Floor:  tandem 30\" X2 L/Rirex:  NBOS 30: X2  NBOS with head turns L/R X6  No [place] 4/10:  Tandem stance L/R 30\" X2     Wedge Balance Fwd/Bkwds 30\" X1e        Hip Hikes X12 off 6\" step L/R    t             L    8\" alternatingFWD  taps L/R 2 x10      Seated Swiss Ball Thoracic stretches    Therex     Theraband     Spinal  Stabs at [facility]    Total Gym Level 10  B Squats 2 X15  L mini squats 1 X10    Gait P; a       Manual cues to prevent early heel rise     Other Therapeutic Activities: 3/19:Time spent on reassessing patient;'s Hip strength, ankle/foot flexibility/strength as well as improvements in functional gait/stairs. PN sent to MD requesting additional PT visits. X15'  2/20: Time spent reassessing patient's strength/flexibility as well as progress with function. Patient is in agreement with patient recommendation of continuation of skilled PT and advancing PT POC. PN sent to MD.     1/23: Brief assessment of L ankle/foot strength and ROM see above under tests/measures:  1/16/19 Pt was educated on PT POC, Diagnosis, Prognosis, pathomechanics as well as frequency and duration of scheduling future physical therapy appointments. Time was also taken on this day to answer all patient questions and participation in PT. Home Exercise Program:   3/5: added toe flexion washcloth pick-ups   3/1: added standing hip ext/plank multifidus   2/20: Added Bridges, sidelying hip ER L, 2\" Lateral dips, L yellow t-bamnd gastroc and peroneals  2/11: Added Orange t-band 1/23: added towel stretch, Long sit HS stretch, quad stretch off EOB, toe flexion/ext, ankle AROM INV/EV, hookline piriformis   1/16:  The following exercises were performed and added to the pt's home program (educated on appropriate frequency, intensity and duration etc.): bent knee fall outs, chin tucks, scapular retraction, pelvic tilts, standing HS stretch    Manual Treatments:    4/10:R Leg pull to correct R innominate upslip, MET to correct C2IODYN/FRSR, L4 ERSR, ROL sacral torsion, R posterior innominate, L SI anterior innominate SI, Therastick to B gastrocsoleus and L anterior tin followed with brief Hawks  tongue depressor to distal anterior tib tendon L; I strip I to O at 25% along anterior tib X35'  3/21:MET to correct L3 FRSL, L4 ERSL, LOL sacral torsion via L unilateral sacral respiration mobs, R anterior hip glides Grade II-III followed with PROM R hip extension X8; L Hawk  X10 strokes banana to [employer] 1/2 gastroc followed with tongue depressor to medial gastroc/achilles tendon, manual achilles stretches, Hawk  to Anterolateral L Lower leg ,usculature X10 strokes and at intersection of lower leg and talus near anterior tib tendon,  STM/MFR:L Talocrural Joint mobs Grades III-IV, Gastroc/Soleus Stretches, Ankle PROM, intermetatarsal mobs Grade II-III, subtalur joint [id number]'  3/19: MET to correct L3 NRLSR/FRSL, L4 ERSL, DONNELL sacral torsion, R anterior hip glides Ge=rade II-III followed with PROM R hip extension X8; X14'  3/14: STM/MFR L anterior tibialis along 1st metatarsal and insertion, gentle talocrural and subtalur distractions Grade II followed with manual achilles stretches L 30\" X4 and PROM Ankle EV X8, L cubois A/P glides Grade III, STM/MFR L anterior tibialis muscles beely to tendon as well as posterior tibialis around medial malleolus X18'    3/13: MET to correct R Pubic upslip, L5 NRLSR/FRSL, L4 ERSL, LOL sacral torsion, L SI posterior innominate SI, R SI anterior innominate SI, gentle talocrural and subtalur distractions Grade II followed with manual achilles stretches L 30\" X4 and PROM Ankle EV X8, L cubois A/P glides Grade III, STM/MFR L anterior tibialis muscles beely to tendon as well as posterior tibialis around medial malleolus X18'  3/6::HAwk  to L gastroc/achilles in prone with banana X10 strokes and then distal achilles attachments with tongue depressor followed X10'  2/11: Long sitting CP L foot/ank;le X10'2/7: ice bag to go; pt. Too tired for MHP spine and CP L foot   2/4:Supine MHP to cthoracic/lumbar ps X10' with grey bolster behind knees and CP cervical ps x10'  1/30, 1/29:Supin  MHP to cervical/ ps  X10'with grey bolster behind knees; ice bag to go L ank;e  1/24:  Supine with HOB elevated CP to cervical/thoracic/lumbar ps X10' with grey bolster behind knees and CP L ankle/foot x10'  1/23:  Supine MHP to cervical/thoracic/lumbar ps X10' with grey bolster behind knees and CP L ankle/foot x10'   1/16/18: Sitting  MHP to cervical/thoracic/lumbar ps X10'     Timed Code Treatment Minutes:   Total Treatment Minutes:   Treatment/Activity Tolerance:  [x] Patient tolerated treatment well [] Patient limited by fatigue  [] Patient limited by pain  [] Patient limited by other medical complications  [x] Other: instructed patient if pain with ankle IN/EV to only do AROM versus T-band PREs  Prognosis: [x] Good [] Fair  [] Poor    Patient Requires Follow-up: [x] Yes  [] No    Plan:   [x] Continue per plan of care [] Alter current plan (see comments)  [] Plan of care initiated [] Hold pending MD visit [] Discharge  Plan for Next Session:   L ankle/foot manual/stretches, progress with AROM, balance exercises,  Progress HEP, add manual for spine as needed, DLS, hip strengthening  Electronically signed by:  Madhuri Curran PT

## 2019-04-16 ENCOUNTER — HOSPITAL ENCOUNTER (OUTPATIENT)
Dept: PHYSICAL THERAPY | Age: 59
Setting detail: THERAPIES SERIES
Discharge: HOME | End: 2019-04-16
Payer: COMMERCIAL

## 2019-04-16 DIAGNOSIS — N32.81 OAB (OVERACTIVE BLADDER): ICD-10-CM

## 2019-04-16 PROCEDURE — 97140 MANUAL THERAPY 1/> REGIONS: CPT

## 2019-04-16 PROCEDURE — 97112 NEUROMUSCULAR REEDUCATION: CPT

## 2019-04-16 PROCEDURE — 97110 THERAPEUTIC EXERCISES: CPT

## 2019-04-16 NOTE — FLOWSHEET NOTE
3    Outpatient Physical Therapy     Phone: [phone] Fax: [phone]         Physical Therapy Daily Treatment Note  Date:  2019 Late entry; Tx completed on     Patient Name:  Fiorella Chavez    :  1960  MRN: [id number]  Restrictions/Precautions:    Medical/Treatment Diagnosis Information:   · Diagnosis: Chronic Neck Pain M54.2, Cervical DDD M50.30, Cervical Somatic Dysfunction M99,0, B LBP without sciatica M54.5, Iliotibial Band syndrome M54.6; L anterior tibialis Tendonitis  · Treatment Diagnosis: CervicalLumbar Postural Malalignments with core/scapular/LE Weakness limiting functional reacging/gait/stairs    Tracking Information:  Physician Information Referring Practitioner: James Johns , [address] [place] [address] Sent Date: 19 Signed Received: 19,  Y    Visit Count / Total Visits + -    Insurance Approved Visits   Approved Dates:     Insurance Information PT   Progress Note/G-codes   []  Yes   [x]  No Next Due: 20     Pain level: Lower neck/upper thoracic 2/10, 1/10 Lower back; L 2/10/,/R/L foot/pain ranges 3-4/10    Subjective  :Patient states she felt fine after last PT visit but did aggravate her L foot when putting furniture together for her friend over the weekend to a 4/10 on the bottom of her foot. 4/10:Casey returned from 2 week trip to [facility] last Thursday. She reports that she wore the boot 90% of the time due to rough terrain. Pain at worst was 6/10, but stayed primarily at a 3-4/10. Her back one day hurt severely. 3/21:Patient states her L ankle/foot feels overall better since her last PT session with pain levels 1/10. She will be gone for 2 weeks for [employer] trip. Patient instructed to take her bands, do her stretches, and take her walking boot just in case. Patient also instructed to only wear her supportive gym shoes with inserts as well.     3/19: See PN  3/14:Pain significantly decreased in top of foot but in bottom germanll bothering her. 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 She states that she got 3 sharp pains on top of her L foot and then it transitioned to a achy pain. Pain om top still this AM at a 3-4/10. She Patient reports on Monday she \"ran\"up to 2 flights of steps with heavy CPAP machine and was sore from that as well. 3/6: Patient reports minimal pain following yesterday's treatment session with additional new exercises. 3/5:Patient reports her l foot has been pretty good except for Sunday when she was walking around her house barefoot getting ready for [employer]. Her trip to [facility] is 20 days away. 3/1: Patient reports she got her new New Balance walking shoes and her feet feel much more supported with pain levels 1/10 or less,  She still feels a pull when pushing off her L foot under her big toe/arch.  :2/22: Patient reports feet are finally starting to feel better. R foot no pain. L foot pain the last day or so is across stop  of foot at the bend. 2/20:See PN; Pain on L foot along medial and plantar surface of 1st metatarsal and on arch. States still wearing orthotics (partial  2/14:Patient reports getting her POwersteps on Tuesday and has been wearing them almost all the time despite being told to wean into them. Patient showed PT the Powersteps and they are midl engrth. Patient reports after her last PT session she was really sore. 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 She said that she can walk faster now but she has increased lower back pain with walking faster. Pain in L foot is along inside arch of L foot. 2/7:She reports feet are a 6-7/10 even on the R one. She states she worked from her daughter's house today and had to help take care of her 2 year grandchildren and 2 month old infant grandaughter. Neck/low back pain only uncomfortable today.     2/4:She reports that she 40\" X2 L/R  B heel raises 2X10    Mat     Hookline Hip Add isos 5\" X10N      Resisted L peroneals yellow t-band 2X1 resisted posterior tib2 X10        UBE/Pulleys           HEP/NMR     Cables     Parallel Bars/NMR irex:  NBOS 30: X2        Lateral step ups  6\"1X10 L/RFWD step-ups  6\"X10 L/R    6\" L foot on step with R foot lunge reaching with R hand to L Forward Diagonal to improve L foot pronation eric. At navicular and [id number]        Hip Hikes X12 off 6\" step L/R    t             L          Seated Swiss Ball Thoracic stretches    Therex     Theraband     Spinal  Stabs at [facility] SS    Total Gym     Gait                    Rebounder P; a      NBOS X5 2 lb ball   Stagger Stance L/R X10 2 lb ball Manual cues to prevent early heel rise     Other Therapeutic Activities: 3/19:Time spent on reassessing patient;'s Hip strength, ankle/foot flexibility/strength as well as improvements in functional gait/stairs. PN sent to MD requesting additional PT visits. X15'  2/20: Time spent reassessing patient's strength/flexibility as well as progress with function. Patient is in agreement with patient recommendation of continuation of skilled PT and advancing PT POC. PN sent to MD.     1/23: Brief assessment of L ankle/foot strength and ROM see above under tests/measures:  1/16/19 Pt was educated on PT POC, Diagnosis, Prognosis, pathomechanics as well as frequency and duration of scheduling future physical therapy appointments. Time was also taken on this day to answer all patient questions and participation in PT. Home Exercise Program:   3/5: added toe flexion washcloth pick-ups   3/1: added standing hip ext/plank multifidus   2/20: Added Bridges, sidelying hip ER L, 2\" Lateral dips, L yellow t-bamnd gastroc and peroneals  2/11: Added Orange t-band 1/23: added towel stretch, Long sit HS stretch, quad stretch off EOB, toe flexion/ext, ankle AROM INV/EV, hookline piriformis   1/16:  The following exercises were performed and added to the pt's home program (educated on appropriate frequency, intensity and duration etc.): bent knee fall outs, chin tucks, scapular retraction, pelvic tilts, standing HS stretch    Manual Treatments:    4/16: Met to correct L4 FRSL, L3 NRLSR/ERSL, L1 NRLSR/ERSL, L posterior hip glides, L cuboid a/P glides and L navicular plantarflexion mobs, Subtalur joint mobs distraction Grade III followed with L rearfoot ankle EV stretches 5\" X5, Hawks  tongue depressor to distal anterior tib tendon L; Therastick to B distal gastrocs, L Hip joint lateral distracion Grade III as well as L Hip posterior glides Grade III followed with PROM L hip flexion and IR X5; X30'  4/10:R Leg pull to correct R innominate upslip, MET to correct B1IWAUN/FRSR, L4 ERSR, ROL sacral torsion, R posterior innominate, L SI anterior innominate SI, Therastick to B gastrocsoleus and L anterior tin followed with brief Hawks  tongue depressor to distal anterior tib tendon L; I strip I to O at 25% along anterior tib X35'  3/21:MET to correct L3 FRSL, L4 ERSL, LOL sacral torsion via L unilateral sacral respiration mobs, R anterior hip glides Grade II-III followed with PROM R hip extension X8; L Hawk  X10 strokes banana to [employer] 1/2 gastroc followed with tongue depressor to medial gastroc/achilles tendon, manual achilles stretches, Hawk  to Anterolateral L Lower leg ,usculature X10 strokes and at intersection of lower leg and talus near anterior tib tendon,  STM/MFR:L Talocrural Joint mobs Grades III-IV, Gastroc/Soleus Stretches, Ankle PROM, intermetatarsal mobs Grade II-III, subtalur joint [id number]'  3/19: MET to correct L3 NRLSR/FRSL, L4 ERSL, DONNELL sacral torsion, R anterior hip glides Ge=rade II-III followed with PROM R hip extension X8; X14'  3/14: STM/MFR L anterior tibialis along 1st metatarsal and insertion, gentle talocrural and subtalur distractions Grade II followed with manual achilles stretches L 30\" attachments with tongue depressor followed with STM/MFR, Passive achilles stretches 10\" X4, L Talocrural Joint mobs Grades III-IV, Gastroc/Soleus Stretches, Ankle PROM, intermetataesal mobs Grade II-III, subtalur joint distraction, STM/MFR plantar surface of L foot, L MTP distraction followed with PROM all toes flexion [id number]'   2/11, 2/7:L Talocrural Joint mobs Grades III-IV, Gastroc/Soleus Stretches, Ankle PROM, intermetataesal mobs Grade II-III, subtalur joint distraction, STM/MFR plantar surface of L foot, L MTP distraction followed with PROM all toes flexion [id number]   2/4:MET to correct L5 NRLSR/FRSL, L4 ERSL, LOL/DONNELL sacra;l torsion, L Pubic upslip, L SI posterior innominate and R SI anterior innominate, STM/MFR L/R  Cervical/thoracic  ps/suboccipitals, Levator/UT, very gentle SOR/manual cervical traction  1/30.  C1 Lateral press, C/R suboccipitals, STM/MFR L/R platysmus B cervical ps/suboccipitals, Levator/UT , MET to correct C7 FRSL, C6 FRSR, T1 FRSL, T2 FRSR, SOR/manual cervical traction, L achilles stretches 30\" X3, L intermetatarsal mobs as well as forefoot stretches into pronation 5\" X5 X20  1/29:L C1 Lateral press, C/R suboccipitals, STM/MFR L/R platysmus B cervical ps/suboccipitals, Levator/UT , MET to correct C7 FRSL, C6 FRSR, T1 FRSL, T2 FRSR, SOR/manual cervical traction, ,  X15'   1/29:L C1 Lateral press, C/R suboccipitals, STM/MFR L/R platysmus B cervical ps/suboccipitals, Levator/UT , MET to correct C7 ERSL, T1 FRSL, SOR/manual cervical traction, PA glides T1-T5 Grade I,  X15'  1/24:STM/MFR B cervical ps/suboccipitals, Levator/UT followed with SOR,  MET to correct L5 FRSL, DONNELL and LOL sacral torsion, L SI posterior innominate SI, R SI anterior innominate SI, L/R Lateral hip distraction followed with L/R hip flexion PROM X6, L achilles stretches 30\" X3, L intermetatarsal mobs as well as forefoot stretches into pronation 5\" X5  1/23: MET to correct L5 FRSL, DONNELL and LOL sacral torsion, L SI posterior innominate SI, R SI anterior innominate SI, L/R Lateral hip distraction followed with L/R hip flexion PROM X6, L achilles stretches 30\" X3, L intermetatarsal mobs as well as forefoot stretches into pronation 5\" X5  Modalities  4/16: Sitting in chair with CP to plantar surface of L foot and ankle and hip/back  X10  3/21: ice bag to go  3/14: US 1.0 w/c, X8' 3.3 MhZ 50% to distall anterior tibialis muscle belly/tendon and Proximal posterior tibialis;CP L ankle/foot x10' at end of tx session  3/13: US 1.0 w/c, X8' 3.3 MhZ 50% to distall anterior tibialis muscle belly/tendon and Proximal posterior tibialis; ice bag to go   3/6, 3/5, 3/1, 2/22, 2/20, Sitting in chair with CP to plantar surface of L foot and ankle and hip/back  X10'  :2/14:Sitting in chair with CP to plantar surface of foor and ankle X10'  2/11: Long sitting CP L foot/ank;le X10'2/7: ice bag to go; pt.  Too tired for MHP spine and CP L foot   2/4:Supine MHP to cthoracic/lumbar ps X10' with grey bolster behind knees and CP cervical ps x10'  1/30, 1/29:Supin  MHP to cervical/ ps  X10'with grey bolster behind knees; ice bag to go L ank;e  1/24:  Supine with HOB elevated CP to cervical/thoracic/lumbar ps X10' with grey bolster behind knees and CP L ankle/foot x10'  1/23:  Supine MHP to cervical/thoracic/lumbar ps X10' with grey bolster behind knees and CP L ankle/foot x10'   1/16/18: Sitting  MHP to cervical/thoracic/lumbar ps X10'     Timed Code Treatment Minutes:54  Total Treatment Minutes:64   Treatment/Activity Tolerance:  [x] Patient tolerated treatment well [] Patient limited by fatigue  [] Patient limited by pain  [] Patient limited by other medical complications  [x] Other: instructed patient if pain with ankle IN/EV to only do AROM versus T-band PREs  Prognosis: [x] Good [] Fair  [] Poor    Patient Requires Follow-up: [x] Yes  [] No    Plan:   [x] Continue per plan of care [] Alter current plan (see comments)  [] Plan of care initiated [] Hold pending

## 2019-04-17 RX ORDER — MIRABEGRON 25 MG/1
TABLET, FILM COATED, EXTENDED RELEASE ORAL
Qty: 30 TABLET | Refills: 5 | Status: SHIPPED | OUTPATIENT
Start: 2019-04-17 | End: 2019-11-10 | Stop reason: SDUPTHER

## 2019-04-18 ENCOUNTER — OFFICE VISIT (OUTPATIENT)
Dept: PULMONOLOGY | Age: 59
End: 2019-04-18
Payer: COMMERCIAL

## 2019-04-18 VITALS
DIASTOLIC BLOOD PRESSURE: 80 MMHG | WEIGHT: 218 LBS | HEART RATE: 84 BPM | BODY MASS INDEX: 40.12 KG/M2 | OXYGEN SATURATION: 98 % | SYSTOLIC BLOOD PRESSURE: 118 MMHG | HEIGHT: 62 IN

## 2019-04-18 DIAGNOSIS — G47.33 OSA (OBSTRUCTIVE SLEEP APNEA): Primary | ICD-10-CM

## 2019-04-18 DIAGNOSIS — K21.9 GASTROESOPHAGEAL REFLUX DISEASE WITHOUT ESOPHAGITIS: ICD-10-CM

## 2019-04-18 DIAGNOSIS — E66.9 OBESITY (BMI 30-39.9): ICD-10-CM

## 2019-04-18 DIAGNOSIS — J45.20 MILD INTERMITTENT ASTHMA WITHOUT COMPLICATION: ICD-10-CM

## 2019-04-18 PROCEDURE — 99214 OFFICE O/P EST MOD 30 MIN: CPT | Performed by: NURSE PRACTITIONER

## 2019-04-18 ASSESSMENT — SLEEP AND FATIGUE QUESTIONNAIRES
HOW LIKELY ARE YOU TO NOD OFF OR FALL ASLEEP WHILE LYING DOWN TO REST IN THE AFTERNOON WHEN CIRCUMSTANCES PERMIT: 3
HOW LIKELY ARE YOU TO NOD OFF OR FALL ASLEEP IN A CAR, WHILE STOPPED FOR A FEW MINUTES IN TRAFFIC: 0
HOW LIKELY ARE YOU TO NOD OFF OR FALL ASLEEP WHILE SITTING QUIETLY AFTER LUNCH WITHOUT ALCOHOL: 0
HOW LIKELY ARE YOU TO NOD OFF OR FALL ASLEEP WHILE SITTING AND TALKING TO SOMEONE: 0
HOW LIKELY ARE YOU TO NOD OFF OR FALL ASLEEP WHILE WATCHING TV: 1
HOW LIKELY ARE YOU TO NOD OFF OR FALL ASLEEP WHEN YOU ARE A PASSENGER IN A CAR FOR AN HOUR WITHOUT A BREAK: 3
HOW LIKELY ARE YOU TO NOD OFF OR FALL ASLEEP WHILE SITTING AND READING: 0
ESS TOTAL SCORE: 7
HOW LIKELY ARE YOU TO NOD OFF OR FALL ASLEEP WHILE SITTING INACTIVE IN A PUBLIC PLACE: 0

## 2019-04-18 ASSESSMENT — ENCOUNTER SYMPTOMS
ABDOMINAL PAIN: 0
SINUS PRESSURE: 0
RHINORRHEA: 0
COUGH: 0
SHORTNESS OF BREATH: 0
EYE REDNESS: 0
APNEA: 0
EYE PAIN: 0
ABDOMINAL DISTENTION: 0

## 2019-04-18 NOTE — LETTER
[facility] Sleep Medicine  [address]   [facility]  [address]  [place] [address]  Phone: [phone]  Fax: [phone]    April 18, 2019       Patient: Kalee Sanchez   MR Number: [id number]   YOB: 1960   Date of Visit: 4/18/2019       Millicent Osman was seen for a follow up visit today. Here is my assessment and plan as well as an attached copy of her visit today:    Obesity (BMI 30-39. 9)  Chronic-Stable. Encouraged her to work on weight loss through diet and exercise. GERD (Gastroesophageal Reflux Disease)  Chronic- Stable. Cont meds per PCP and other physicians. Asthma  Chronic- Stable. Cont meds per PCP and other physicians. GAYATHRI (obstructive sleep apnea)  Reviewed compliance download with pt. Supplies and parts as needed for her machine. These are medically necessary. Continue medications per her PCP and other physicians. Limit caffeine use after 3pm.  Encouraged her to work on weight loss through diet and exercise. Diagnoses of Obesity (BMI 30-39.9), Gastroesophageal reflux disease without esophagitis, and Mild intermittent asthma without complication were pertinent to this visit. The chronic medical conditions listed are directly related to the primary diagnosis listed above. The management of the primary diagnosis affects the secondary diagnosis and vice versa. If you have questions or concerns, please do not hesitate to call me. I look forward to following [place] along with you.     Sincerely,    ASHLY Mac - CNP    CC providers:  Damien Quinteros, [address] [facility] [address]

## 2019-04-18 NOTE — ASSESSMENT & PLAN NOTE
Reviewed compliance download with pt. Supplies and parts as needed for her machine. These are medically necessary. Continue medications per her PCP and other physicians. Limit caffeine use after 3pm.  Encouraged her to work on weight loss through diet and exercise. Diagnoses of Obesity (BMI 30-39.9), Gastroesophageal reflux disease without esophagitis, and Mild intermittent asthma without complication were pertinent to this visit. The chronic medical conditions listed are directly related to the primary diagnosis listed above. The management of the primary diagnosis affects the secondary diagnosis and vice versa.

## 2019-04-18 NOTE — PROGRESS NOTES
Yadira Bueno         : 1960    Diagnosis: [x] GAYATHRI (G47.33) [] CSA (G47.31) [] Apnea (G47.30)   Length of Need: [x] 15 Months [] 99 Months [] Other:    Machine (CHRISSY!): [x] Respironics Dream Station      Auto [] ResMed AirSense     Auto [] Other:     []  CPAP () [] Bilevel ()   Mode: [] Auto [] Spontaneous    Mode: [] Auto [] Spontaneous                            Comfort Settings:   - Ramp Pressure:  cmH2O                                        - Ramp time: 15 min                                     -  Flex/EPR - 3 full time                                    - For ResMed Bilevel (TiMax-4 sec   TiMin- 0.2 sec)     Humidifier: [x] Heated ()        [x] Water chamber replacement ()/ 1 per 6 months        Mask:   [x] Nasal () /1 per 3 months [] Full Face () /1 per 3 months   [x] Patient choice -Size and fit mask [] Patient Choice - Size and fit mask   [] Dispense:  [] Dispense:    [x] Headgear () / 1 per 3 months [] Headgear () / 1 per 3 months   [] Replacement Nasal Cushion ()/2 per month [] Interface Replacement ()/1 per month   [x] Replacement Nasal Pillows ()/2 per month         Tubing: [x] Heated ()/1 per 3 months    [] Standard ()/1 per 3 months [] Other:           Filters: [x] Non-disposable ()/1 per 6 months     [x] Ultra-Fine, Disposable ()/2 per month        Miscellaneous: [] Chin Strap ()/ 1 per 6 months [] O2 bleed-in:       LPM   [] Oximetry on CPAP/Bilevel []  Other:          Start Order Date: 19    MEDICAL JUSTIFICATION:  I, the undersigned, certify that the above prescribed supplies are medically necessary for this patients wellbeing. In my opinion, the supplies are both reasonable and necessary in reference to accepted standards of medicalpractice in treatment of this patients condition.     ASHLY Nelson - CNP      NPI: [id number]       Order Signed Date: 19    Electronically signed by Marquita Lopez, ASHLY - CNP on 4/18/2019 at 8:36 AM

## 2019-04-18 NOTE — PROGRESS NOTES
home  [x] Yes  [] No     Difficulties falling asleep  [] Yes  [x] No   Difficulties staying asleep  [] Yes  [x] No   Approximate time to bed  10pm-12am   Approximate wake time  5:45-7:30am   Taking Naps  no   If taking naps usual length    [x] NA   If taking naps using the machine  [] Yes  [] No  [x] NA   Drowsy when driving  [] Yes  [x] No     Does patient carry a DOT/CDL  [] Yes  [x] No     Does patient carry FAA/Pilots License   [] Yes  [x] No      Any concerns noted with the machine at this time  [] Yes  [x] No        Diagnosis Orders   1. GAYATHRI (obstructive sleep apnea)     2. Obesity (BMI 30-39.9)     3. Gastroesophageal reflux disease without esophagitis     4. Mild intermittent asthma without complication         The chronic medical conditions listed are directly related to the primary diagnosis listed above. The management of the primary diagnosis affects the secondary diagnosis and vice versa. Review of Systems   Constitutional: Negative for appetite change, chills, fatigue and fever. HENT: Negative for congestion, nosebleeds, rhinorrhea and sinus pressure. Eyes: Negative for pain and redness. Respiratory: Negative for apnea, cough and shortness of breath. Cardiovascular: Negative for chest pain and palpitations. Gastrointestinal: Negative for abdominal distention and abdominal pain. Neurological: Negative for dizziness and headaches. Psychiatric/Behavioral: Positive for sleep disturbance.        Social History     Socioeconomic History    Marital status:      Spouse name: Not on file    Number of children: Not on file    Years of education: Not on file    Highest education level: Not on file   Occupational History    Not on file   Social Needs    Financial resource strain: Not on file    Food insecurity:     Worry: Not on file     Inability: Not on file    Transportation needs:     Medical: Not on file     Non-medical: Not on file   Tobacco Use    Smoking status: Never no more than 2 weeks at a time. 12/5/13  Yes John Hollis MD   Azelastine-Fluticasone (DYMISTA NA) by Nasal route daily. Yes Historical Provider, MD   Multiple Vitamin (MULTIVITAMIN PO) Take  by mouth daily. Yes Historical Provider, MD   levocetirizine (XYZAL) 5 MG tablet Take 5 mg by mouth nightly. Yes Historical Provider, MD   Cholecalciferol (VITAMIN D) 2000 UNIT TABS Take  by mouth daily. 7/18/11  Yes Cait Loza MD   [facility]) 220 MCG/INH inhaler Inhale 1 puff into the lungs 2 times daily. 3/18/11  Yes Historical Provider, MD   montelukast (SINGULAIR) 10 MG TABS Take 1 Tab by mouth daily. 12/28/09  Yes Historical Provider, MD   Cholecalciferol 2000 units TABS Take 1 tablet by mouth daily 11/13/17 8/10/18  Tisha Prieto MD       Allergies as of 04/18/2019 - Review Complete 04/18/2019   Allergen Reaction Noted    Adhesive tape  05/05/2017    Diflucan [fluconazole] Other (See Comments) 12/28/2009    Flagyl [metronidazole hcl]  12/28/2009    Sulfacetamide sodium-sulfur Dermatitis 06/23/2014       Patient Active Problem List   Diagnosis    GERD (gastroesophageal reflux disease)    Anxiety    Arthritis    Hiatal hernia    Vitamin D deficiency    Iron deficiency anemia    B12 deficiency    Asthma    GAYATHRI (obstructive sleep apnea)    Hearing disorder, sensorineural    Obesity (BMI 30-39. 9)    Venous insufficiency of both lower extremities    Folate deficiency    OAB (overactive bladder)       Past Medical History:   Diagnosis Date    Allergic rhinitis     Anxiety     Asthma     Depression     Gastritis     GERD (gastroesophageal reflux disease)     Hiatal hernia     Iron defic anemia     OAB (overactive bladder) 1/30/2019    Obesity     GAYATHRI (obstructive sleep apnea) 2009    Unspecified sleep apnea     cpap    Venous insufficiency of both lower extremities 9/6/2016    Vitamin B12 deficiency     Vitamin D deficiency        Past Surgical History: (BMI 30-39.9), Gastroesophageal reflux disease without esophagitis, and Mild intermittent asthma without complication were also pertinent to this visit. The chronic medical conditions listed are directly related to the primary diagnosis listed above. The management of the primary diagnosis affects the secondary diagnosis and vice versa. - Educated patient and reviewed compliance download with pt.    -Supplies and parts as needed for her machine, these are medically necessary.    - Patient using [employer] for supplies  -Continue medications per her PCP and other physicians.   -Limit caffeine use after 3pm.    -Encouraged her to work on weight loss through diet and exercise. - Will change Pmin to 7 to mirror old machine since she uncomfortable  - Will try and increase humidity and tube temperature if pressure change does nto help the dry mouth and eyes  - Educated on expectation with pressure with weight loss   - Both machines included in compliance wince both were used   -F/U: 12 month. No orders of the defined types were placed in this encounter. No orders of the defined types were placed in this encounter. No orders of the defined types were placed in this encounter.       Armin Vera, MSN, RN, CNP

## 2019-04-19 ENCOUNTER — HOSPITAL ENCOUNTER (OUTPATIENT)
Dept: PHYSICAL THERAPY | Age: 59
Setting detail: THERAPIES SERIES
Discharge: HOME | End: 2019-04-19
Payer: COMMERCIAL

## 2019-04-19 NOTE — PROGRESS NOTES
Physical Therapy  Cancellation/No-show Note  Patient Name:  Derrek Ennis  :  1960   Date:  2019  Cancelled visits to date: 2  No-shows to date: 0    Patient status for today's appointment patient:  [x]  Cancelled 19, 19  []  Rescheduled appointment  []  No-show     Reason given by patient:  []  Patient ill  []  Conflicting appointment  []  No transportation    []  Conflict with work  []  No reason given  [x]  Other:     Comments:   In too much pain  Phone call information:   []  Phone call made today to patient at _ time at number provided:      []  Patient answered, conversation as follows:    []  Patient did not answer, message left as follows:  [x]  Phone call not made today    Electronically signed by:  Dawn Simpson, PT

## 2019-04-22 ENCOUNTER — HOSPITAL ENCOUNTER (OUTPATIENT)
Dept: PHYSICAL THERAPY | Age: 59
Setting detail: THERAPIES SERIES
Discharge: HOME | End: 2019-04-22
Payer: COMMERCIAL

## 2019-04-22 NOTE — PROGRESS NOTES
Physical Therapy  Cancellation/No-show Note  Patient Name:  Osmany Longo  :  1960   Date:  2019  Cancelled visits to date: 2  No-shows to date: 1    Patient status for today's appointment patient:  []  Cancelled 19, 19  []  Rescheduled appointment  [x]  No-show 19     Reason given by patient:  []  Patient ill  []  Conflicting appointment  []  No transportation    []  Conflict with work  []  No reason given  [x]  Other:     Comments: Phone call information:   [x]  Phone call made today to patient at 1635time at number provided:      []  Patient answered, conversation as follows:    [x]  Patient did not answer, message left as follows:informed patient of missed appointment and other 2 PT visits scheduled this week. Asked patient if she is unable to make it into PT to call and cancel her visit.   []  Phone call not made today    Electronically signed by:  Florecita Mitchell PT

## 2019-04-24 ENCOUNTER — HOSPITAL ENCOUNTER (OUTPATIENT)
Dept: PHYSICAL THERAPY | Age: 59
Setting detail: THERAPIES SERIES
Discharge: HOME | End: 2019-04-24
Payer: COMMERCIAL

## 2019-04-24 PROCEDURE — 97110 THERAPEUTIC EXERCISES: CPT

## 2019-04-24 PROCEDURE — 97035 APP MDLTY 1+ULTRASOUND EA 15: CPT

## 2019-04-24 PROCEDURE — 97140 MANUAL THERAPY 1/> REGIONS: CPT

## 2019-04-24 NOTE — FLOWSHEET NOTE
walking boot just in case. Patient also instructed to only wear her supportive gym shoes with inserts as well. 3/19: See PN  3/14:Pain significantly decreased in top of foot but in bottom stuill bothering her. 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 She states that she got 3 sharp pains on top of her L foot and then it transitioned to a achy pain. Pain om top still this AM at a 3-4/10. She Patient reports on Monday she \"ran\"up to 2 flights of steps with heavy CPAP machine and was sore from that as well. 3/6: Patient reports minimal pain following yesterday's treatment session with additional new exercises. 3/5:Patient reports her l foot has been pretty good except for Sunday when she was walking around her house barefoot getting ready for [facility]. Her trip to [facility] is 20 days away. 3/1: Patient reports she got her new [employer] Balance walking shoes and her feet feel much more supported with pain levels 1/10 or less,  She still feels a pull when pushing off her L foot under her big toe/arch.  :2/22: Patient reports feet are finally starting to feel better. R foot no pain. L foot pain the last day or so is across stop  of foot at the bend. 2/20:See PN; Pain on L foot along medial and plantar surface of 1st metatarsal and on arch. States still wearing orthotics (partial  2/14:Patient reports getting her POwersteps on Tuesday and has been wearing them almost all the time despite being told to wean into them. Patient showed PT the Powersteps and they are midl engrth. Patient reports after her last PT session she was really sore. 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 She said that she can walk faster now but she has increased lower back pain with walking faster. Pain in L foot is along inside arch of L foot. 2/7:She reports feet are a 6-7/10 even on the R one.   She states she worked from her daughter's house today and had to help take care of her 2 year grandchildren and 2 month old infant mile. Neck/low back pain only uncomfortable today. 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 She states that her lower back is really sore today after walking fast to a meeting and standing the whole time. She also has significant tightness in neck/head. 1/30:Dr. Mckeon Minors her muscle relaxor today at Dr. Ann Marie Cruz office visit and told her to stay on Naproxen. MD also ordered x-rays for her cervical spine. After manual PT yesterday, her pain level in her was temporarily decreased to 3-4/10. L foot is starting to feel better except for walking fast or for walking longer than 15'. 1/29: Patient reports neck pain was really lowered to a 2/10 but after a few days the pain started to increase. She wemt to [facility] and walked 15-20' leaning on a cart. 1/24:Upper back/neck is biggest issue today with pain 8.5/10 but no HA today. Painful to perform scapular retraction. Feet are really sore as well./   1/19:Patient reports weaning out of boot on her L foot and presents to the clinic wearing gym shoes. She states L foot/pain ranges 4-6/10 since being out of the boot part of the time.   States she has walked upto 10' at a time so far in shoes only  1/16/18 See initial evaluation    Objective:   Observation:  4/24: see manual section   3/19: See PN   3/14: Myofascial bands L anterior tibialis along 1st metatarsal and insertion   3/5: 15' late to PT visit  2/20: See PN  2/4: see manual section  1/23: pt. pResents to clinic wearing gym shoes   See initial evaluation  Test measurements1/23: Brief assessment of L ankle/foot t: .L ankle strength: peroneals 4+/5, posterior tibialis 3+/5 (pain), anterior tibialis 3/5 (no MMT with pain), L ankle DF 12, PF 45, rearfoot IN 38, rearfoot EV 2, gastroc 8; myofascial bands L gastroc/anterior tibialis), TTP anterior/posterior tib/medial longitudinal arch; decreased forefoot/ankle pronation :      Exercises:  Exercise/Equipment Resistance/Repetitions Other comments   Nustep/Stretch 5' Level 3 /L/R standing Hip flexor stretches 3-\" X2; Standing Quad Stretch 30\" X2    IB/ gastroc 30\" X3  L/R soleus 40\" X2 L/R      Mat     Hookline Hip Add isos 5\" X10N              UBE/Pulleys           HEP/NMR     Cables     Parallel Bars/NMR Airex:  NBOS 30: X2  NBOS with head turns L/R X6  Feet together 30\" X2  stag       e        Hip Hikes X12 off 6\" step L/R    t             L          Seated Swiss Ball Thoracic stretches    Therex     Theraband     Spinal  Stabs at [facility]    Total Gym     Gait                    Rebounder P; a       Manual cues to prevent early heel rise     Other Therapeutic Activities:4/24: Time spent discussing with patient p[ossibly thather L anterior foot pain could be originally stemming from her Lower back dysfunction. Informed patient based on clinical exam and reports of pain in back and along L Lateral thigh and Great toe webspace that she may have L4/L5 nerve root impingement. She also demonstrates  L4/L5 postural malalignments. 3/19:Time spent on reassessing patient;'s Hip strength, ankle/foot flexibility/strength as well as improvements in functional gait/stairs. PN sent to MD requesting additional PT visits. X15'  2/20: Time spent reassessing patient's strength/flexibility as well as progress with function. Patient is in agreement with patient recommendation of continuation of skilled PT and advancing PT POC. PN sent to MD.     1/23: Brief assessment of L ankle/foot strength and ROM see above under tests/measures:  1/16/19 Pt was educated on PT POC, Diagnosis, Prognosis, pathomechanics as well as frequency and duration of scheduling future physical therapy appointments. Time was also taken on this day to answer all patient questions and participation in PT.        Home Exercise cervical traction, ,  X15'   1/29:L C1 Lateral press, C/R suboccipitals, STM/MFR L/R platysmus B cervical ps/suboccipitals, Levator/UT , MET to correct C7 ERSL, T1 FRSL, SOR/manual cervical traction, PA glides T1-T5 Grade I,  X15'  1/24:STM/MFR B cervical ps/suboccipitals, Levator/UT followed with SOR,  MET to correct L5 FRSL, DONNELL and LOL sacral torsion, L SI posterior innominate SI, R SI anterior innominate SI, L/R Lateral hip distraction followed with L/R hip flexion PROM X6, L achilles stretches 30\" X3, L intermetatarsal mobs as well as forefoot stretches into pronation 5\" X5  1/23: MET to correct L5 FRSL, DONNELL and LOL sacral torsion, L SI posterior innominate SI, R SI anterior innominate SI, L/R Lateral hip distraction followed with L/R hip flexion PROM X6, L achilles stretches 30\" X3, L intermetatarsal mobs as well as forefoot stretches into pronation 5\" X5  Modalities  4/24:US 1.0 w/c, X10' 3.3 MhZ 50% to distall anterior tibialis muscle belly/tendon and Proximal posterior tibialis;CP L ankle/foot x10' at end of tx session  4/16: Sitting in chair with CP to plantar surface of L foot and ankle and hip/back  X10  3/21: ice bag to go  3/14: US 1.0 w/c, X8' 3.3 MhZ 50% to distall anterior tibialis muscle belly/tendon and Proximal posterior tibialis;CP L ankle/foot x10' at end of tx session  3/13: US 1.0 w/c, X8' 3.3 MhZ 50% to distall anterior tibialis muscle belly/tendon and Proximal posterior tibialis; ice bag to go   3/6, 3/5, 3/1, 2/22, 2/20, Sitting in chair with CP to plantar surface of L foot and ankle and hip/back  X10'  :2/14:Sitting in chair with CP to plantar surface of foor and ankle X10'  2/11: Long sitting CP L foot/ank;le X10'2/7: ice bag to go; pt.  Too tired for MHP spine and CP L foot   2/4:Supine MHP to cthoracic/lumbar ps X10' with grey bolster behind knees and CP cervical ps x10'  1/30, 1/29:Supin  MHP to cervical/ ps  X10'with grey bolster behind knees; ice bag to go L ank;e  1/24:  Supine with HOB elevated CP to cervical/thoracic/lumbar ps X10' with grey bolster behind knees and CP L ankle/foot x10'  1/23:  Supine MHP to cervical/thoracic/lumbar ps X10' with grey bolster behind knees and CP L ankle/foot x10'   1/16/18: Sitting  MHP to cervical/thoracic/lumbar ps X10'     Timed Code Treatment Minutes:60  Total Treatment Minutes:70  Treatment/Activity Tolerance:  [x] Patient tolerated treatment well [] Patient limited by fatigue  [] Patient limited by pain  [] Patient limited by other medical complications  [x] Other: instructed patient if pain with ankle IN/EV to only do AROM versus T-band PREs  Prognosis: [x] Good [] Fair  [] Poor    Patient Requires Follow-up: [x] Yes  [] No    Plan:   [x] Continue per plan of care [] Alter current plan (see comments)  [] Plan of care initiated [] Hold pending MD visit [] Discharge  Plan for Next Session:   L ankle/foot manual/stretches, progress with AROM, balance exercises,  Progress HEP, add manual for spine as needed, DLS, hip strengthening  Electronically signed by:  Keren Vizcaino, PT

## 2019-04-26 ENCOUNTER — HOSPITAL ENCOUNTER (OUTPATIENT)
Dept: PHYSICAL THERAPY | Age: 59
Setting detail: THERAPIES SERIES
End: 2019-04-26
Payer: COMMERCIAL

## 2019-05-20 ENCOUNTER — HOSPITAL ENCOUNTER (OUTPATIENT)
Dept: GENERAL RADIOLOGY | Age: 59
Discharge: HOME | End: 2019-05-20
Payer: COMMERCIAL

## 2019-05-20 ENCOUNTER — HOSPITAL ENCOUNTER (OUTPATIENT)
Age: 59
Discharge: HOME | End: 2019-05-20
Payer: COMMERCIAL

## 2019-05-20 DIAGNOSIS — M50.30 DDD (DEGENERATIVE DISC DISEASE), CERVICAL: ICD-10-CM

## 2019-05-20 DIAGNOSIS — M54.50 BILATERAL LOW BACK PAIN WITHOUT SCIATICA, UNSPECIFIED CHRONICITY: ICD-10-CM

## 2019-05-20 PROCEDURE — 72040 X-RAY EXAM NECK SPINE 2-3 VW: CPT

## 2019-05-20 PROCEDURE — 72110 X-RAY EXAM L-2 SPINE 4/>VWS: CPT

## 2019-05-21 ENCOUNTER — OFFICE VISIT (OUTPATIENT)
Dept: INTERNAL MEDICINE CLINIC | Age: 59
End: 2019-05-21
Payer: COMMERCIAL

## 2019-05-21 VITALS
BODY MASS INDEX: 39.75 KG/M2 | DIASTOLIC BLOOD PRESSURE: 70 MMHG | WEIGHT: 216 LBS | HEART RATE: 86 BPM | RESPIRATION RATE: 12 BRPM | SYSTOLIC BLOOD PRESSURE: 120 MMHG | HEIGHT: 62 IN

## 2019-05-21 DIAGNOSIS — E53.8 FOLATE DEFICIENCY: ICD-10-CM

## 2019-05-21 DIAGNOSIS — E53.8 B12 DEFICIENCY: Primary | ICD-10-CM

## 2019-05-21 DIAGNOSIS — E55.9 VITAMIN D DEFICIENCY: ICD-10-CM

## 2019-05-21 DIAGNOSIS — R60.0 LEG EDEMA: ICD-10-CM

## 2019-05-21 DIAGNOSIS — I87.2 VENOUS INSUFFICIENCY OF BOTH LOWER EXTREMITIES: ICD-10-CM

## 2019-05-21 DIAGNOSIS — N32.81 OAB (OVERACTIVE BLADDER): ICD-10-CM

## 2019-05-21 DIAGNOSIS — M50.30 DDD (DEGENERATIVE DISC DISEASE), CERVICAL: ICD-10-CM

## 2019-05-21 PROCEDURE — 99214 OFFICE O/P EST MOD 30 MIN: CPT | Performed by: INTERNAL MEDICINE

## 2019-05-21 PROCEDURE — 96372 THER/PROPH/DIAG INJ SC/IM: CPT | Performed by: INTERNAL MEDICINE

## 2019-05-21 RX ORDER — CYANOCOBALAMIN 1000 UG/ML
1000 INJECTION INTRAMUSCULAR; SUBCUTANEOUS ONCE
Status: COMPLETED | OUTPATIENT
Start: 2019-05-21 | End: 2019-05-21

## 2019-05-21 RX ORDER — NAPROXEN 500 MG/1
500 TABLET ORAL 2 TIMES DAILY PRN
Qty: 60 TABLET | Refills: 0 | Status: SHIPPED | OUTPATIENT
Start: 2019-05-21 | End: 2021-02-12

## 2019-05-21 RX ADMIN — CYANOCOBALAMIN 1000 MCG: 1000 INJECTION INTRAMUSCULAR; SUBCUTANEOUS at 15:50

## 2019-05-21 NOTE — PROGRESS NOTES
[facility]) Physicians  Internal Medicine  Patient Encounter  [facility]., [facility]        Chief Complaint   Patient presents with    Medication Check       HPI: 62 y.o. female with multiple medical issues who presents today for follow up regarding the status of those issues listed below along with a medication review. Health maintenance items or overdue:  Flu vaccine  Shingles Vaccine  A1C    Asthma--  She denies cough, SOB, wheezing. Patient is maintained on Asmanex and Singulair. She sees Dr. Terrie Martin. She has not needed her rescue inhaler. Depression/Anxiety-- Patient remains on Cymbalta without adverse side effects. She used to see a psychologist.  She thinks the medication helps with GI distress. Hr anxiety and Depression are under good control. She is C/O total body pain. We have discussed Fibromyalgia in the past.  She C/O mostly neck pain, but also has arm pain. She locates the pain in the shoulders and all the way to her hands. Vit D def--She states she is taking Vit D3 2000 units daily. Last Vit D level was 45. Folate Def-- She is also on Folate. B12 Def-- She has been getting her B12 injections. She denies dizziness. Lab Results   Component Value Date    WBC 5.1 11/29/2018    HGB 12.7 11/29/2018    HCT 41.0 11/29/2018    MCV 92.4 11/29/2018     11/29/2018     Venous insufficiency-- She is wearing compression stockings mostly every day. She is not wearing stockings today. She does restrict salt much. GAYATHRI-- She is compliant with her CPAP. Energy level is good. OAB-- Myrbetriq helps. No incont. We also reviewed her cervical spine x-rays.       Past Medical History:   Diagnosis Date    Allergic rhinitis     Anxiety     Asthma     Depression     Gastritis     GERD (gastroesophageal reflux disease)     Hiatal hernia     Iron defic anemia     OAB (overactive bladder) 1/30/2019    Obesity     GAYATHRI (obstructive sleep apnea) 2009    Unspecified sleep apnea     cpap    Venous insufficiency of both lower extremities 9/6/2016    Vitamin B12 deficiency     Vitamin D deficiency      Prior to Visit Medications    Medication Sig Taking? Authorizing Provider   naproxen (NAPROSYN) 500 MG tablet Take 1 tablet by mouth 2 times daily as needed Yes River Figueroa DO   MYRBETRIQ 25 MG TB24 TAKE 1 TABLET BY MOUTH DAILY Yes River Figueroa DO   folic acid (FOLVITE) 1 MG tablet Take 1 tablet by mouth daily Yes River Figueroa DO   DULoxetine (CYMBALTA) 60 MG extended release capsule TAKE ONE CAPSULE BY MOUTH EVERY DAY Yes River Figueroa DO   ondansetron (ZOFRAN) 4 MG tablet Take 1 tablet by mouth every 6 hours as needed for Nausea or Vomiting Yes River Figueroa DO   potassium chloride (KLOR-CON M) 20 MEQ extended release tablet TAKE 1 TABLET BY MOUTH DAILY Yes River Figueroa DO   furosemide (LASIX) 40 MG tablet Take 1 tablet by mouth daily as needed (for swelling) Yes River Figueroa DO   hydrocortisone 2.5 % cream Apply to the eyebrows daily if needed for scaling. Use no more than 2 weeks at a time. Yes Cierra Mccann MD   Azelastine-Fluticasone (DYMISTA NA) by Nasal route daily. Yes Historical Provider, MD   Multiple Vitamin (MULTIVITAMIN PO) Take  by mouth daily. Yes Historical Provider, MD   levocetirizine (XYZAL) 5 MG tablet Take 5 mg by mouth nightly. Yes Historical Provider, MD   Cholecalciferol (VITAMIN D) 2000 UNIT TABS Take  by mouth daily. Yes Kaylen Patch, MD   mometasone [facility]) 220 MCG/INH inhaler Inhale 1 puff into the lungs 2 times daily. Yes Historical Provider, MD   montelukast (SINGULAIR) 10 MG TABS Take 1 Tab by mouth daily.  Yes Historical Provider, MD   tiZANidine (ZANAFLEX) 4 MG tablet Take 1 tablet by mouth nightly  River Figueroa DO   Cholecalciferol 2000 units TABS Take 1 tablet by mouth daily  Taj Camp MD         Review of Systems -       OBJECTIVE:  /70   Pulse 86   Resp 12   Ht 5' 2\" (1.575 m)

## 2019-05-31 DIAGNOSIS — E53.8 FOLATE DEFICIENCY: ICD-10-CM

## 2019-06-03 DIAGNOSIS — E53.8 FOLATE DEFICIENCY: ICD-10-CM

## 2019-06-03 DIAGNOSIS — M50.30 DDD (DEGENERATIVE DISC DISEASE), CERVICAL: ICD-10-CM

## 2019-06-03 RX ORDER — TIZANIDINE 4 MG/1
TABLET ORAL
Qty: 30 TABLET | Refills: 0 | Status: SHIPPED | OUTPATIENT
Start: 2019-06-03 | End: 2019-07-06 | Stop reason: SDUPTHER

## 2019-06-03 RX ORDER — FOLIC ACID 1 MG/1
1 TABLET ORAL DAILY
Qty: 30 TABLET | Refills: 0 | Status: SHIPPED | OUTPATIENT
Start: 2019-06-03 | End: 2019-07-04 | Stop reason: SDUPTHER

## 2019-06-03 RX ORDER — FOLIC ACID 1 MG/1
1 TABLET ORAL DAILY
Qty: 30 TABLET | Refills: 0 | Status: SHIPPED | OUTPATIENT
Start: 2019-06-03 | End: 2020-01-27

## 2019-07-04 DIAGNOSIS — E53.8 FOLATE DEFICIENCY: ICD-10-CM

## 2019-07-05 RX ORDER — FOLIC ACID 1 MG/1
1 TABLET ORAL DAILY
Qty: 30 TABLET | Refills: 5 | Status: SHIPPED | OUTPATIENT
Start: 2019-07-05 | End: 2019-11-27 | Stop reason: SDUPTHER

## 2019-07-06 DIAGNOSIS — M50.30 DDD (DEGENERATIVE DISC DISEASE), CERVICAL: ICD-10-CM

## 2019-07-08 RX ORDER — TIZANIDINE 4 MG/1
TABLET ORAL
Qty: 30 TABLET | Refills: 0 | Status: SHIPPED | OUTPATIENT
Start: 2019-07-08 | End: 2019-08-04 | Stop reason: SDUPTHER

## 2019-08-04 DIAGNOSIS — M50.30 DDD (DEGENERATIVE DISC DISEASE), CERVICAL: ICD-10-CM

## 2019-08-05 RX ORDER — TIZANIDINE 4 MG/1
TABLET ORAL
Qty: 30 TABLET | Refills: 0 | Status: SHIPPED | OUTPATIENT
Start: 2019-08-05 | End: 2019-09-02 | Stop reason: SDUPTHER

## 2019-09-02 DIAGNOSIS — M50.30 DDD (DEGENERATIVE DISC DISEASE), CERVICAL: ICD-10-CM

## 2019-09-03 RX ORDER — TIZANIDINE 4 MG/1
TABLET ORAL
Qty: 30 TABLET | Refills: 0 | Status: SHIPPED | OUTPATIENT
Start: 2019-09-03 | End: 2019-09-30 | Stop reason: SDUPTHER

## 2019-09-30 DIAGNOSIS — M50.30 DDD (DEGENERATIVE DISC DISEASE), CERVICAL: ICD-10-CM

## 2019-09-30 RX ORDER — TIZANIDINE 4 MG/1
TABLET ORAL
Qty: 30 TABLET | Refills: 0 | Status: SHIPPED | OUTPATIENT
Start: 2019-09-30 | End: 2019-10-28 | Stop reason: SDUPTHER

## 2019-10-04 DIAGNOSIS — M54.50 BILATERAL LOW BACK PAIN WITHOUT SCIATICA, UNSPECIFIED CHRONICITY: ICD-10-CM

## 2019-10-04 DIAGNOSIS — G89.29 CHRONIC NECK PAIN: ICD-10-CM

## 2019-10-04 DIAGNOSIS — M99.01 CERVICAL SOMATIC DYSFUNCTION: ICD-10-CM

## 2019-10-04 DIAGNOSIS — M54.2 CHRONIC NECK PAIN: ICD-10-CM

## 2019-10-04 RX ORDER — NAPROXEN 500 MG/1
TABLET ORAL
Qty: 60 TABLET | Refills: 2 | Status: SHIPPED | OUTPATIENT
Start: 2019-10-04 | End: 2019-11-27 | Stop reason: SDUPTHER

## 2019-10-28 DIAGNOSIS — M50.30 DDD (DEGENERATIVE DISC DISEASE), CERVICAL: ICD-10-CM

## 2019-10-28 RX ORDER — TIZANIDINE 4 MG/1
TABLET ORAL
Qty: 30 TABLET | Refills: 0 | Status: SHIPPED | OUTPATIENT
Start: 2019-10-28 | End: 2019-12-01 | Stop reason: SDUPTHER

## 2019-11-10 DIAGNOSIS — N32.81 OAB (OVERACTIVE BLADDER): ICD-10-CM

## 2019-11-11 RX ORDER — MIRABEGRON 25 MG/1
TABLET, FILM COATED, EXTENDED RELEASE ORAL
Qty: 30 TABLET | Refills: 0 | Status: SHIPPED | OUTPATIENT
Start: 2019-11-11 | End: 2019-12-12 | Stop reason: SDUPTHER

## 2019-11-27 ENCOUNTER — OFFICE VISIT (OUTPATIENT)
Dept: INTERNAL MEDICINE CLINIC | Age: 59
End: 2019-11-27
Payer: COMMERCIAL

## 2019-11-27 VITALS
DIASTOLIC BLOOD PRESSURE: 70 MMHG | WEIGHT: 220 LBS | BODY MASS INDEX: 40.48 KG/M2 | HEART RATE: 76 BPM | SYSTOLIC BLOOD PRESSURE: 136 MMHG | HEIGHT: 62 IN | RESPIRATION RATE: 12 BRPM

## 2019-11-27 DIAGNOSIS — Z13.220 SCREENING FOR HYPERLIPIDEMIA: ICD-10-CM

## 2019-11-27 DIAGNOSIS — E53.8 B12 DEFICIENCY: ICD-10-CM

## 2019-11-27 DIAGNOSIS — E55.9 VITAMIN D DEFICIENCY: ICD-10-CM

## 2019-11-27 DIAGNOSIS — G47.33 OSA (OBSTRUCTIVE SLEEP APNEA): ICD-10-CM

## 2019-11-27 DIAGNOSIS — I49.3 PVC (PREMATURE VENTRICULAR CONTRACTION): ICD-10-CM

## 2019-11-27 DIAGNOSIS — R01.1 HEART MURMUR: ICD-10-CM

## 2019-11-27 DIAGNOSIS — Z13.1 SCREENING FOR DIABETES MELLITUS: ICD-10-CM

## 2019-11-27 DIAGNOSIS — Z23 NEED FOR TDAP VACCINATION: ICD-10-CM

## 2019-11-27 DIAGNOSIS — Z00.00 ANNUAL PHYSICAL EXAM: Primary | ICD-10-CM

## 2019-11-27 DIAGNOSIS — R94.31 ABNORMAL EKG: ICD-10-CM

## 2019-11-27 DIAGNOSIS — I87.2 VENOUS INSUFFICIENCY OF BOTH LOWER EXTREMITIES: ICD-10-CM

## 2019-11-27 DIAGNOSIS — R03.0 ELEVATED BP WITHOUT DIAGNOSIS OF HYPERTENSION: ICD-10-CM

## 2019-11-27 PROCEDURE — 96372 THER/PROPH/DIAG INJ SC/IM: CPT | Performed by: INTERNAL MEDICINE

## 2019-11-27 PROCEDURE — 90715 TDAP VACCINE 7 YRS/> IM: CPT | Performed by: INTERNAL MEDICINE

## 2019-11-27 PROCEDURE — 90471 IMMUNIZATION ADMIN: CPT | Performed by: INTERNAL MEDICINE

## 2019-11-27 PROCEDURE — 99396 PREV VISIT EST AGE 40-64: CPT | Performed by: INTERNAL MEDICINE

## 2019-11-27 PROCEDURE — 93000 ELECTROCARDIOGRAM COMPLETE: CPT | Performed by: INTERNAL MEDICINE

## 2019-11-27 RX ORDER — CYANOCOBALAMIN 1000 UG/ML
1000 INJECTION INTRAMUSCULAR; SUBCUTANEOUS ONCE
Status: COMPLETED | OUTPATIENT
Start: 2019-11-27 | End: 2019-11-27

## 2019-11-27 RX ORDER — FUROSEMIDE 40 MG/1
40 TABLET ORAL DAILY PRN
COMMUNITY
End: 2020-03-25 | Stop reason: SDUPTHER

## 2019-11-27 RX ADMIN — CYANOCOBALAMIN 1000 MCG: 1000 INJECTION INTRAMUSCULAR; SUBCUTANEOUS at 12:06

## 2019-12-01 DIAGNOSIS — M50.30 DDD (DEGENERATIVE DISC DISEASE), CERVICAL: ICD-10-CM

## 2019-12-02 RX ORDER — TIZANIDINE 4 MG/1
TABLET ORAL
Qty: 30 TABLET | Refills: 2 | Status: SHIPPED | OUTPATIENT
Start: 2019-12-02 | End: 2020-03-02

## 2019-12-02 RX ORDER — DULOXETIN HYDROCHLORIDE 60 MG/1
CAPSULE, DELAYED RELEASE ORAL
Qty: 30 CAPSULE | Refills: 11 | Status: SHIPPED | OUTPATIENT
Start: 2019-12-02 | End: 2020-12-23

## 2019-12-12 DIAGNOSIS — N32.81 OAB (OVERACTIVE BLADDER): ICD-10-CM

## 2019-12-12 RX ORDER — MIRABEGRON 25 MG/1
TABLET, FILM COATED, EXTENDED RELEASE ORAL
Qty: 30 TABLET | Refills: 5 | Status: SHIPPED | OUTPATIENT
Start: 2019-12-12 | End: 2021-08-18

## 2019-12-26 ENCOUNTER — OFFICE VISIT (OUTPATIENT)
Dept: INTERNAL MEDICINE CLINIC | Age: 59
End: 2019-12-26
Payer: COMMERCIAL

## 2019-12-26 VITALS
HEART RATE: 91 BPM | SYSTOLIC BLOOD PRESSURE: 148 MMHG | TEMPERATURE: 99.2 F | DIASTOLIC BLOOD PRESSURE: 80 MMHG | RESPIRATION RATE: 14 BRPM | OXYGEN SATURATION: 95 %

## 2019-12-26 DIAGNOSIS — I10 BENIGN ESSENTIAL HTN: Primary | ICD-10-CM

## 2019-12-26 DIAGNOSIS — J06.9 URI WITH COUGH AND CONGESTION: ICD-10-CM

## 2019-12-26 DIAGNOSIS — E53.8 B12 DEFICIENCY: ICD-10-CM

## 2019-12-26 PROCEDURE — 96372 THER/PROPH/DIAG INJ SC/IM: CPT | Performed by: INTERNAL MEDICINE

## 2019-12-26 PROCEDURE — 99214 OFFICE O/P EST MOD 30 MIN: CPT | Performed by: INTERNAL MEDICINE

## 2019-12-26 RX ORDER — BENZONATATE 200 MG/1
200 CAPSULE ORAL 3 TIMES DAILY PRN
Qty: 30 CAPSULE | Refills: 0 | Status: SHIPPED | OUTPATIENT
Start: 2019-12-26 | End: 2021-02-12

## 2019-12-26 RX ORDER — GUAIFENESIN AND DEXTROMETHORPHAN HYDROBROMIDE 600; 30 MG/1; MG/1
1 TABLET, EXTENDED RELEASE ORAL 2 TIMES DAILY
Qty: 28 TABLET | Refills: 0 | COMMUNITY
Start: 2019-12-26 | End: 2021-02-12

## 2019-12-26 RX ORDER — FLUTICASONE PROPIONATE 50 MCG
2 SPRAY, SUSPENSION (ML) NASAL DAILY
Qty: 1 BOTTLE | Refills: 0 | Status: SHIPPED | OUTPATIENT
Start: 2019-12-26 | End: 2021-02-12

## 2019-12-26 RX ORDER — CYANOCOBALAMIN 1000 UG/ML
1000 INJECTION INTRAMUSCULAR; SUBCUTANEOUS ONCE
Status: COMPLETED | OUTPATIENT
Start: 2019-12-26 | End: 2019-12-26

## 2019-12-26 RX ADMIN — CYANOCOBALAMIN 1000 MCG: 1000 INJECTION INTRAMUSCULAR; SUBCUTANEOUS at 11:04

## 2019-12-27 ENCOUNTER — TELEPHONE (OUTPATIENT)
Dept: INTERNAL MEDICINE CLINIC | Age: 59
End: 2019-12-27

## 2019-12-27 DIAGNOSIS — I10 BENIGN ESSENTIAL HTN: Primary | ICD-10-CM

## 2019-12-27 RX ORDER — VALSARTAN 80 MG/1
80 TABLET ORAL DAILY
Qty: 30 TABLET | Refills: 5 | Status: SHIPPED | OUTPATIENT
Start: 2019-12-27 | End: 2020-05-01 | Stop reason: SDUPTHER

## 2020-01-07 ENCOUNTER — HOSPITAL ENCOUNTER (OUTPATIENT)
Dept: NON INVASIVE DIAGNOSTICS | Age: 60
Discharge: HOME | End: 2020-01-07
Payer: COMMERCIAL

## 2020-01-07 LAB
LEFT VENTRICULAR EJECTION FRACTION HIGH VALUE: 70 %
LEFT VENTRICULAR EJECTION FRACTION MODE: NORMAL
LV EF: 65 %
LV EF: 68 %
LVEF MODALITY: NORMAL

## 2020-01-07 PROCEDURE — 93306 TTE W/DOPPLER COMPLETE: CPT

## 2020-01-24 ENCOUNTER — HOSPITAL ENCOUNTER (OUTPATIENT)
Dept: MAMMOGRAPHY | Age: 60
Discharge: HOME | End: 2020-01-24
Payer: COMMERCIAL

## 2020-01-24 PROCEDURE — 77063 BREAST TOMOSYNTHESIS BI: CPT

## 2020-01-27 RX ORDER — FOLIC ACID 1 MG/1
1 TABLET ORAL DAILY
Qty: 30 TABLET | Refills: 5 | Status: SHIPPED | OUTPATIENT
Start: 2020-01-27 | End: 2020-08-03

## 2020-03-02 RX ORDER — TIZANIDINE 4 MG/1
TABLET ORAL
Qty: 30 TABLET | Refills: 2 | Status: SHIPPED | OUTPATIENT
Start: 2020-03-02 | End: 2020-05-29

## 2020-03-25 ENCOUNTER — TELEPHONE (OUTPATIENT)
Dept: INTERNAL MEDICINE CLINIC | Age: 60
End: 2020-03-25

## 2020-03-25 RX ORDER — FUROSEMIDE 40 MG/1
40 TABLET ORAL DAILY PRN
Qty: 30 TABLET | Refills: 2 | Status: SHIPPED | OUTPATIENT
Start: 2020-03-25

## 2020-03-25 RX ORDER — POTASSIUM CHLORIDE 20 MEQ/1
TABLET, EXTENDED RELEASE ORAL
Qty: 30 TABLET | Refills: 2 | Status: SHIPPED | OUTPATIENT
Start: 2020-03-25

## 2020-03-25 NOTE — TELEPHONE ENCOUNTER
Last appointment: 12/26/2019  Next appointment: Visit date not found  Last refill: Lasix 11/27/19                   Hector Dougherty 05/03/2017

## 2020-04-27 ENCOUNTER — TELEPHONE (OUTPATIENT)
Dept: INTERNAL MEDICINE CLINIC | Age: 60
End: 2020-04-27

## 2020-04-27 NOTE — TELEPHONE ENCOUNTER
Pharmacy states Valsartan 80 mg is on back order. They want to know if they can switch to Two 40 mg, or split 160mg. Please contact pharmacy @ phone # provided, with Dr. Usha Nuñez recommendation.

## 2020-04-29 ENCOUNTER — TELEPHONE (OUTPATIENT)
Dept: INTERNAL MEDICINE CLINIC | Age: 60
End: 2020-04-29

## 2020-04-29 NOTE — TELEPHONE ENCOUNTER
Fatou with [facility] called because patient recently was switched to Valsartan 40 mg because 80 mg were on backorder. They now have the 80's and patient wants to know if she can be changed back over to the 80 mg's? Please call Fatou to let her know at number provided. Thanks.

## 2020-05-01 RX ORDER — VALSARTAN 80 MG/1
80 TABLET ORAL DAILY
Qty: 30 TABLET | Refills: 5 | Status: SHIPPED | OUTPATIENT
Start: 2020-05-01 | End: 2020-07-06 | Stop reason: SDUPTHER

## 2020-05-29 RX ORDER — TIZANIDINE 4 MG/1
TABLET ORAL
Qty: 30 TABLET | Refills: 2 | Status: SHIPPED | OUTPATIENT
Start: 2020-05-29 | End: 2020-12-28

## 2020-07-06 RX ORDER — VALSARTAN 80 MG/1
80 TABLET ORAL DAILY
Qty: 30 TABLET | Refills: 5 | Status: SHIPPED | OUTPATIENT
Start: 2020-07-06 | End: 2021-02-18

## 2020-08-03 RX ORDER — FOLIC ACID 1 MG/1
1 TABLET ORAL DAILY
Qty: 30 TABLET | Refills: 5 | Status: SHIPPED | OUTPATIENT
Start: 2020-08-03 | End: 2021-02-08

## 2020-12-23 RX ORDER — DULOXETIN HYDROCHLORIDE 60 MG/1
CAPSULE, DELAYED RELEASE ORAL
Qty: 30 CAPSULE | Refills: 0 | Status: SHIPPED | OUTPATIENT
Start: 2020-12-23 | End: 2021-01-18

## 2020-12-23 NOTE — TELEPHONE ENCOUNTER
Last appointment: 12/26/2019  Next appointment: Visit date not found  Overdue for office visit.   Pt notified  Last refill: 12/2019

## 2020-12-28 RX ORDER — TIZANIDINE 4 MG/1
TABLET ORAL
Qty: 30 TABLET | Refills: 5 | Status: SHIPPED | OUTPATIENT
Start: 2020-12-28 | End: 2021-08-18 | Stop reason: SDUPTHER

## 2020-12-28 NOTE — TELEPHONE ENCOUNTER
Last appointment: 12/26/2019  Next appointment: Visit date not found  Last refill: 05/29/2020 # 30 with 2 refills

## 2021-01-18 RX ORDER — DULOXETIN HYDROCHLORIDE 60 MG/1
CAPSULE, DELAYED RELEASE ORAL
Qty: 15 CAPSULE | Refills: 0 | Status: SHIPPED | OUTPATIENT
Start: 2021-01-18 | End: 2021-02-08

## 2021-01-18 NOTE — TELEPHONE ENCOUNTER
Last appointment: 12/26/2019  Next appointment: Visit date not found  Last refill: 12/23/20      Sent Fabule message stating due for OV. Last refill was for #30 will cut down to #15.

## 2021-01-20 ENCOUNTER — VIRTUAL VISIT (OUTPATIENT)
Dept: PULMONOLOGY | Age: 61
End: 2021-01-20
Payer: COMMERCIAL

## 2021-01-20 DIAGNOSIS — E66.9 OBESITY (BMI 30-39.9): ICD-10-CM

## 2021-01-20 DIAGNOSIS — I49.3 PVC (PREMATURE VENTRICULAR CONTRACTION): ICD-10-CM

## 2021-01-20 DIAGNOSIS — K21.9 GASTROESOPHAGEAL REFLUX DISEASE WITHOUT ESOPHAGITIS: ICD-10-CM

## 2021-01-20 DIAGNOSIS — I10 BENIGN ESSENTIAL HTN: ICD-10-CM

## 2021-01-20 DIAGNOSIS — J45.20 MILD INTERMITTENT ASTHMA WITHOUT COMPLICATION: ICD-10-CM

## 2021-01-20 DIAGNOSIS — G47.33 OSA (OBSTRUCTIVE SLEEP APNEA): Primary | ICD-10-CM

## 2021-01-20 PROCEDURE — 99214 OFFICE O/P EST MOD 30 MIN: CPT | Performed by: NURSE PRACTITIONER

## 2021-01-20 ASSESSMENT — SLEEP AND FATIGUE QUESTIONNAIRES
HOW LIKELY ARE YOU TO NOD OFF OR FALL ASLEEP WHILE SITTING AND TALKING TO SOMEONE: 0
ESS TOTAL SCORE: 3
HOW LIKELY ARE YOU TO NOD OFF OR FALL ASLEEP IN A CAR, WHILE STOPPED FOR A FEW MINUTES IN TRAFFIC: 0
HOW LIKELY ARE YOU TO NOD OFF OR FALL ASLEEP WHILE LYING DOWN TO REST IN THE AFTERNOON WHEN CIRCUMSTANCES PERMIT: 3
HOW LIKELY ARE YOU TO NOD OFF OR FALL ASLEEP WHILE SITTING QUIETLY AFTER LUNCH WITHOUT ALCOHOL: 0
HOW LIKELY ARE YOU TO NOD OFF OR FALL ASLEEP WHILE WATCHING TV: 0

## 2021-01-20 NOTE — PROGRESS NOTES
Mirza Summers MD, FAASM, [facility]  Ana Greenberg, MSN, RN, CNP     [address] [facility] [address] [facility]  [place] [address] [address] [facility] [address]  Dept: [phone]  Dept Fax: [phone]: Neena Glez [address] [place] [id number] [phone]    Subjective:     Patient ID: Vivi Aragon is a 61 y.o. female. Chief Complaint   Patient presents with    Sleep Apnea       HPI:      Sleep Medicine Video Visit    Pursuant to the emergency declaration under the [address] [place] [address], [facility] waiver authority and the [employer] Act this Telephone Visit was insisted, with patient's consent, to reduce the patient's risk of exposure to COVID-19 and provide continuity of care for an established patient. Services were provided through a synchronous discussion over a telephone and/or Video chat to substitute for in-person clinic visit, and coded as such. While patient is at home. Machine Modem/Download Info:  Compliance (hours/night): 6 hrs/night  Download AHI (/hour): 2.7 /HR  Average CPAP Pressure : 10.3 cmH2O           APAP - Settings  Pressure Min: 7 cmH2O  Pressure Max: 19 cmH2O                 Comfort Settings  Flex/EPR (0-3): 3 PAP Mask  Mask Type: Nasal pillows  Clinically Relevant Leak: No     [place] - Total score: 3    Follow-up :     Last Visit : April 2019      Patient reports the listed chronic Co-morbidities: PVC, GERD, THN, Asthma, Obesity    are well controlled and stable at this time.      Subjective Health Changes: None      Over Night Oximetry: [] Yes  [] No  [x] NA [] WNL   Using O2: [] Yes  [] No  [x] NA   Patient is compliant with the machine  [x] Yes  [] No   Feeling rested when using the machine   [x] Yes  [] No     Pressure is comfortable with inspiration and expiration  [x] Yes  [] No Noticed changes in pressure   [] Yes  [] No  [x] NA   Mask is fitting well  [x] Yes  [] No   Noting Mask Air Leak  [] Yes  [x] No   Having painful Aerophagia  [] Yes  [x] No   Nocturia   1  per night. Having  HA upon waking  [] Yes  [x] No   Dry mouth upon waking   Dry Nose  Dry Eyes  [] Yes  [x] No   Congestion upon waking   [] Yes  [x] No    Nose Bleeds  [] Yes  [x] No   Using Sleep Aides    [x] NA   Understands how to change humidification and/or tubing temperature for comfort while at home  [x] Yes  [] No     Difficulties falling asleep  [] Yes  [x] No   Difficulties staying asleep  [] Yes  [x] No   Approximate time to bed  12-1am   Approximate wake time  7:30-7:40am   Taking Naps  occasional   If taking naps usual length  30-45 min    If taking naps using the machine  [] Yes  [x] No  [] NA [] With and With out    Drowsy when driving  [] Yes  [x] No     Does patient carry a DOT/CDL  [] Yes  [x] No     Does patient carry FAA/[employer] License   [] Yes  [x] No      Any concerns noted with the machine at this time  [] Yes  [x] No        Diagnosis Orders   1. GAYATHRI (obstructive sleep apnea)     2. PVC (premature ventricular contraction)     3. Obesity (BMI 30-39.9)     4. Gastroesophageal reflux disease without esophagitis     5. Benign essential HTN     6. Mild intermittent asthma without complication         The chronic medical conditions listed are directly related to the primary diagnosis listed above. The management of the primary diagnosis affects the secondary diagnosis and vice versa. Assessment/Plan:     PVC (premature ventricular contraction)  Chronic- Stable. Cont meds per PCP and other physicians. Obesity (BMI 30-39. 9)  Chronic- Stable. Cont meds per PCP and other physicians. GERD (Gastroesophageal Reflux Disease)  Chronic- Stable. Cont meds per PCP and other physicians. Benign essential HTN  Chronic- Stable. Cont meds per PCP and other physicians.       Asthma Chronic- Stable. Cont meds per PCP and other physicians. GAYATHRI (obstructive sleep apnea)  Reviewed compliance download with pt. Supplies and parts as needed for her machine. These are medically necessary. Continue medications per her PCP and other physicians. Limit caffeine use after 3pm.  Encouraged her to work on weight loss through diet and exercise. Diagnoses of PVC (premature ventricular contraction), Obesity (BMI 30-39.9), Gastroesophageal reflux disease without esophagitis, Benign essential HTN, and Mild intermittent asthma without complication were pertinent to this visit. The chronic medical conditions listed are directly related to the primary diagnosis listed above. The management of the primary diagnosis affects the secondary diagnosis and vice versa. The primary encounter diagnosis was GAYATHRI (obstructive sleep apnea). Diagnoses of PVC (premature ventricular contraction), Obesity (BMI 30-39.9), Gastroesophageal reflux disease without esophagitis, Benign essential HTN, and Mild intermittent asthma without complication were also pertinent to this visit. The chronic medical conditions listed are directly related to the primary diagnosis listed above. The management of the primary diagnosis affects the secondary diagnosis and vice versa. - Educated patient and reviewed compliance download with pt.    -Supplies and parts as needed for her machine, these are medically necessary.    - Patient using [place] for supplies  -Continue medications per her PCP and other physicians.   -Limit caffeine use after 3pm.    -Encouraged her to work on weight loss through diet and exercise. -  Patient able to access video feed. Visit completed via video chat communications. 25 min spent with patient.   - Education on machine function, weight loss and machine   -F/U: 12 month. No orders of the defined types were placed in this encounter. No orders of the defined types were placed in this encounter. No orders of the defined types were placed in this encounter.       Shilo Colmenares, MSN, RN, CNP

## 2021-01-20 NOTE — PROGRESS NOTES
Martinez Lindas         : 1960    Diagnosis: [x] GAYATHRI (G47.33) [] CSA (G47.31) [] Apnea (G47.30)   Length of Need: [x] 12 Months [] 99 Months [] Other:    Machine (CHRISSY!): [x] Respironics Dream Station      Auto [] ResMed AirSense     Auto [] Other:     []  CPAP () [] Bilevel ()   Mode: [] Auto [] Spontaneous    Mode: [] Auto [] Spontaneous                            Comfort Settings:   - Ramp Pressure:  cmH2O                                        - Ramp time: 15 min                                     -  Flex/EPR - 3 full time                                    - For ResMed Bilevel (TiMax-4 sec   TiMin- 0.2 sec)     Humidifier: [x] Heated ()        [x] Water chamber replacement ()/ 1 per 6 months        Mask:   [x] Nasal () /1 per 3 months [] Full Face () /1 per 3 months   [x] Patient choice -Size and fit mask [] Patient Choice - Size and fit mask   [] Dispense:  [] Dispense:    [x] Headgear () / 1 per 3 months [] Headgear () / 1 per 3 months   [] Replacement Nasal Cushion ()/2 per month [] Interface Replacement ()/1 per month   [x] Replacement Nasal Pillows ()/2 per month         Tubing: [x] Heated ()/1 per 3 months    [] Standard ()/1 per 3 months [] Other:           Filters: [x] Non-disposable ()/1 per 6 months     [x] Ultra-Fine, Disposable ()/2 per month        Miscellaneous: [] Chin Strap ()/ 1 per 6 months [] O2 bleed-in:       LPM   [] Oximetry on CPAP/Bilevel []  Other:    [x] Modem: ()         Start Order Date: 21    MEDICAL JUSTIFICATION:  I, the undersigned, certify that the above prescribed supplies are medically necessary for this patients wellbeing. In my opinion, the supplies are both reasonable and necessary in reference to accepted standards of medicalpractice in treatment of this patients condition.     ASHLY Torres - [place]      NPI: [id number]       Order Signed Date: 21    Electronically signed by ASHLY Skaggs CNP on 2021 at 2:11 PM    [address] [facility]  1960  [place] [address] [facility]  [phone] (home)   [phone] (mobile)      Insurance Info (confirm with patient if correct):  Payor/Plan Subscr  Sex Relation Sub.  Ins. ID Effective Group Num

## 2021-01-29 ENCOUNTER — TELEPHONE (OUTPATIENT)
Dept: INTERNAL MEDICINE CLINIC | Age: 61
End: 2021-01-29

## 2021-01-29 NOTE — TELEPHONE ENCOUNTER
Last appointment: 12/26/2019  Next appointment: 2/9/2021  Last refill: 12-    Patient states her Fibromyalgia pain is worsening. She has Tizanidine 4 mg which she takes daily, and sometimes takes 1 & 1/2 pills when her pain is worse. She also takes Naproxen, but not everyday, and alternates this with Tylenol. She feels her pain is increased due to the amount of stress she's under with working at home, not having an ergonomic chair, and concerns for this pandemic. She also states she looked it up, and Tizanidine is most commonly prescribed for 8 mg. She wants Dr. Jonas Brown advice as to whether she should get an increased prescription of Tizanidine, or what does he recommend. [facility] [address] [place] [address], [facility]. [place] [address] [facility] [phone]          Please contact patient @ phone # provided with Dr. Jonas Brown recommendation.

## 2021-02-08 DIAGNOSIS — E53.8 FOLATE DEFICIENCY: ICD-10-CM

## 2021-02-08 RX ORDER — FOLIC ACID 1 MG/1
1 TABLET ORAL DAILY
Qty: 30 TABLET | Refills: 5 | Status: SHIPPED | OUTPATIENT
Start: 2021-02-08 | End: 2021-02-12 | Stop reason: SDUPTHER

## 2021-02-08 RX ORDER — DULOXETIN HYDROCHLORIDE 60 MG/1
CAPSULE, DELAYED RELEASE ORAL
Qty: 30 CAPSULE | Refills: 3 | Status: SHIPPED | OUTPATIENT
Start: 2021-02-08 | End: 2021-05-06

## 2021-02-12 ENCOUNTER — OFFICE VISIT (OUTPATIENT)
Dept: INTERNAL MEDICINE CLINIC | Age: 61
End: 2021-02-12
Payer: COMMERCIAL

## 2021-02-12 VITALS
DIASTOLIC BLOOD PRESSURE: 80 MMHG | TEMPERATURE: 98.2 F | RESPIRATION RATE: 12 BRPM | HEART RATE: 74 BPM | HEIGHT: 62 IN | SYSTOLIC BLOOD PRESSURE: 126 MMHG | WEIGHT: 224 LBS | BODY MASS INDEX: 41.22 KG/M2

## 2021-02-12 DIAGNOSIS — E53.8 B12 DEFICIENCY: ICD-10-CM

## 2021-02-12 DIAGNOSIS — Z23 NEED FOR SHINGLES VACCINE: ICD-10-CM

## 2021-02-12 DIAGNOSIS — R73.01 IMPAIRED FASTING GLUCOSE: ICD-10-CM

## 2021-02-12 DIAGNOSIS — E55.9 VITAMIN D DEFICIENCY: ICD-10-CM

## 2021-02-12 DIAGNOSIS — Z00.00 ANNUAL PHYSICAL EXAM: ICD-10-CM

## 2021-02-12 DIAGNOSIS — Z00.00 ANNUAL PHYSICAL EXAM: Primary | ICD-10-CM

## 2021-02-12 DIAGNOSIS — I10 BENIGN ESSENTIAL HTN: ICD-10-CM

## 2021-02-12 DIAGNOSIS — E53.8 FOLATE DEFICIENCY: ICD-10-CM

## 2021-02-12 DIAGNOSIS — I87.2 VENOUS INSUFFICIENCY OF BOTH LOWER EXTREMITIES: ICD-10-CM

## 2021-02-12 LAB
A/G RATIO: 1.6 (ref 1.1–2.2)
ALBUMIN SERPL-MCNC: 4.3 G/DL (ref 3.4–5)
ALP BLD-CCNC: 91 U/L (ref 40–129)
ALT SERPL-CCNC: 31 U/L (ref 10–40)
ANION GAP SERPL CALCULATED.3IONS-SCNC: 9 MMOL/L (ref 3–16)
AST SERPL-CCNC: 31 U/L (ref 15–37)
BASOPHILS ABSOLUTE: 0.1 K/UL (ref 0–0.2)
BASOPHILS RELATIVE PERCENT: 1.4 %
BILIRUB SERPL-MCNC: <0.2 MG/DL (ref 0–1)
BUN BLDV-MCNC: 15 MG/DL (ref 7–20)
CALCIUM SERPL-MCNC: 9.9 MG/DL (ref 8.3–10.6)
CHLORIDE BLD-SCNC: 102 MMOL/L (ref 99–110)
CHOLESTEROL, TOTAL: 199 MG/DL (ref 0–199)
CO2: 27 MMOL/L (ref 21–32)
CREAT SERPL-MCNC: 0.7 MG/DL (ref 0.6–1.2)
EOSINOPHILS ABSOLUTE: 0.1 K/UL (ref 0–0.6)
EOSINOPHILS RELATIVE PERCENT: 2.6 %
FOLATE: >20 NG/ML (ref 4.78–24.2)
GFR AFRICAN AMERICAN: >60
GFR NON-AFRICAN AMERICAN: >60
GLOBULIN: 2.7 G/DL
GLUCOSE BLD-MCNC: 91 MG/DL (ref 70–99)
HCT VFR BLD CALC: 37.6 % (ref 36–48)
HDLC SERPL-MCNC: 54 MG/DL (ref 40–60)
HEMOGLOBIN: 12 G/DL (ref 12–16)
LDL CHOLESTEROL CALCULATED: 128 MG/DL
LYMPHOCYTES ABSOLUTE: 1.6 K/UL (ref 1–5.1)
LYMPHOCYTES RELATIVE PERCENT: 34.1 %
MCH RBC QN AUTO: 28.5 PG (ref 26–34)
MCHC RBC AUTO-ENTMCNC: 32 G/DL (ref 31–36)
MCV RBC AUTO: 89 FL (ref 80–100)
MONOCYTES ABSOLUTE: 0.5 K/UL (ref 0–1.3)
MONOCYTES RELATIVE PERCENT: 11.1 %
NEUTROPHILS ABSOLUTE: 2.4 K/UL (ref 1.7–7.7)
NEUTROPHILS RELATIVE PERCENT: 50.8 %
PDW BLD-RTO: 15.5 % (ref 12.4–15.4)
PLATELET # BLD: 368 K/UL (ref 135–450)
PMV BLD AUTO: 8.8 FL (ref 5–10.5)
POTASSIUM SERPL-SCNC: 4.7 MMOL/L (ref 3.5–5.1)
RBC # BLD: 4.22 M/UL (ref 4–5.2)
SODIUM BLD-SCNC: 138 MMOL/L (ref 136–145)
TOTAL PROTEIN: 7 G/DL (ref 6.4–8.2)
TRIGL SERPL-MCNC: 87 MG/DL (ref 0–150)
TSH SERPL DL<=0.05 MIU/L-ACNC: 1.16 UIU/ML (ref 0.27–4.2)
VITAMIN B-12: 266 PG/ML (ref 211–911)
VITAMIN D 25-HYDROXY: 55.9 NG/ML
VLDLC SERPL CALC-MCNC: 17 MG/DL
WBC # BLD: 4.7 K/UL (ref 4–11)

## 2021-02-12 PROCEDURE — 93000 ELECTROCARDIOGRAM COMPLETE: CPT | Performed by: INTERNAL MEDICINE

## 2021-02-12 PROCEDURE — 99396 PREV VISIT EST AGE 40-64: CPT | Performed by: INTERNAL MEDICINE

## 2021-02-12 RX ORDER — ZOSTER VACCINE RECOMBINANT, ADJUVANTED 50 MCG/0.5
0.5 KIT INTRAMUSCULAR SEE ADMIN INSTRUCTIONS
Qty: 0.5 ML | Refills: 1 | Status: SHIPPED | OUTPATIENT
Start: 2021-02-12 | End: 2021-08-11

## 2021-02-12 RX ORDER — MELOXICAM 15 MG/1
15 TABLET ORAL DAILY
COMMUNITY

## 2021-02-12 ASSESSMENT — PATIENT HEALTH QUESTIONNAIRE - PHQ9
SUM OF ALL RESPONSES TO PHQ QUESTIONS 1-9: 0
SUM OF ALL RESPONSES TO PHQ9 QUESTIONS 1 & 2: 0
SUM OF ALL RESPONSES TO PHQ QUESTIONS 1-9: 0
1. LITTLE INTEREST OR PLEASURE IN DOING THINGS: 0
SUM OF ALL RESPONSES TO PHQ QUESTIONS 1-9: 0

## 2021-02-12 NOTE — PATIENT INSTRUCTIONS
Preventive plan of care for Jesus Calderón        2/12/2021           Preventive Measures Status       Recommendations for screening   Colon Cancer Screen   Last colonoscopy: 01/18/2016, Dr. Carmen Johns Colonoscopy due every 10 years--2026    Breast Cancer Screen  Last mammogram: 1/24/2020 Repeat yearly. Overdue   Cervical Cancer Screen   Last PAP smear: 12/29/2017 Repeat every 3 years or as advised by gynecologist.  Efrain Head still require a pelvic, rectal, and breast exam yearly. Osteoporosis Screen   Last DXA scan: None Test is due at age 72. Diabetes Screen  Glucose (mg/dL)   Date Value   11/27/2019 92    Test recommended and ordered. Cholesterol Screen   Lab Results   Component Value Date    CHOL 185 11/27/2019    TRIG 82 11/27/2019    HDL 67 (H) 11/27/2019    LDLCALC 102 (H) 11/27/2019    Test recommended and ordered   HIV screening recommended for those ages 12-76 NO MATTER THE RISK FOR HIV--  9/9/2016   No need to repeat at this time    Hepatitis C screening recommended for those between the ages of 25 and 79--9/9/2016  No need to repeat at this time    Aspirin for Cardiovascular Prevention   No  aspirin not indicated at this time due to your low 10-year atherosclerotic cardiovascular disease risk score. Weight: Body mass index is 40.97 kg/m². 5' 2\" (1.575 m)224 lb (101.6 kg)    Your BMI is 25 or greater, which indicates that you are overweight with increased risk of cardiovascular disease. Continue aggressive lifestyle modifications including a low-fat, portion controlled diet along with regular aerobic exercise.   Consider bariatric consultation    Recommended Immunizations    Immunization History   Administered Date(s) Administered    Influenza Virus Vaccine 02/25/2016, 09/22/2019, 10/17/2020    InfluenzaDesirae, IM, PF (6 mo and older Fluzone, Flulaval, Fluarix, and 3 yrs and older Afluria) 09/06/2016, 11/28/2017, 11/29/2018    Pneumococcal Polysaccharide ([id number]) 02/25/2016    Tdap (Boostrix, Adacel) 08/11/2009, 11/27/2019        Influenza vaccine:  recommended every fall    Pneumonia vaccine: Pneumovax due at age 72    Tetanus vaccine:  tetanus and diptheria/Pertussis vaccine (Td/Tdap) recommended every 10 years- Tdap is due 2029    Shingles vaccine:  Shingrx is due. Check pharmacies for availability         Additional Recommendations   1. Use sunscreen daily to help reduce the risk of skin cancer  2. Continue working on lifestyle modification including a calorie restricted and portion control diet focusing on low-fat options as well as low carb options. 3. Update your eye exam every 2 years to screen for glaucoma, cataracts, and macular degeneration  4. Always wear a seatbelt while riding in a car        Here are a few  Reliable websites with a variety of health and wellness information:   www.mySynapDxcheck. heart. org     www.nutritionsource. org     www. americanheart. org     www. diabetes. org      www.menopause. org     www.[facility]     www.[employer] ([facility] site)        Patient Education        DASH Diet: Care Instructions  Your Care Instructions     The DASH diet is an eating plan that can help lower your blood pressure. DASH stands for Dietary Approaches to Stop Hypertension. Hypertension is high blood pressure. The DASH diet focuses on eating foods that are high in calcium, potassium, and magnesium. These nutrients can lower blood pressure. The foods that are highest in these nutrients are fruits, vegetables, low-fat dairy products, nuts, seeds, and legumes. But taking calcium, potassium, and magnesium supplements instead of eating foods that are high in those nutrients does not have the same effect. The DASH diet also includes whole grains, fish, and poultry. The DASH diet is one of several lifestyle changes your doctor may recommend to lower your high blood pressure. Your doctor may also want you to decrease the amount of sodium in your diet.  Lowering sodium while following the DASH diet can lower blood pressure even further than just the DASH diet alone. Follow-up care is a key part of your treatment and safety. Be sure to make and go to all appointments, and call your doctor if you are having problems. It's also a good idea to know your test results and keep a list of the medicines you take. How can you care for yourself at home? Following the DASH diet  · Eat 4 to 5 servings of fruit each day. A serving is 1 medium-sized piece of fruit, ½ cup chopped or canned fruit, 1/4 cup dried fruit, or 4 ounces (½ cup) of fruit juice. Choose fruit more often than fruit juice. · Eat 4 to 5 servings of vegetables each day. A serving is 1 cup of lettuce or raw leafy vegetables, ½ cup of chopped or cooked vegetables, or 4 ounces (½ cup) of vegetable juice. Choose vegetables more often than vegetable juice. · Get 2 to 3 servings of low-fat and fat-free dairy each day. A serving is 8 ounces of milk, 1 cup of yogurt, or 1 ½ ounces of cheese. · Eat 6 to 8 servings of grains each day. A serving is 1 slice of bread, 1 ounce of dry cereal, or ½ cup of cooked rice, pasta, or cooked cereal. Try to choose whole-grain products as much as possible. · Limit lean meat, poultry, and fish to 2 servings each day. A serving is 3 ounces, about the size of a deck of cards. · Eat 4 to 5 servings of nuts, seeds, and legumes (cooked dried beans, lentils, and split peas) each week. A serving is 1/3 cup of nuts, 2 tablespoons of seeds, or ½ cup of cooked beans or peas. · Limit fats and oils to 2 to 3 servings each day. A serving is 1 teaspoon of vegetable oil or 2 tablespoons of salad dressing. · Limit sweets and added sugars to 5 servings or less a week. A serving is 1 tablespoon jelly or jam, ½ cup sorbet, or 1 cup of lemonade. · Eat less than 2,300 milligrams (mg) of sodium a day. If you limit your sodium to 1,500 mg a day, you can lower your blood pressure even more.   Tips for success  · Start small. Do not try to make dramatic changes to your diet all at once. You might feel that you are missing out on your favorite foods and then be more likely to not follow the plan. Make small changes, and stick with them. Once those changes become habit, add a few more changes. · Try some of the following:  ? Make it a goal to eat a fruit or vegetable at every meal and at snacks. This will make it easy to get the recommended amount of fruits and vegetables each day. ? Try yogurt topped with fruit and nuts for a snack or healthy dessert. ? Add lettuce, tomato, cucumber, and onion to sandwiches. ? Combine a ready-made pizza crust with low-fat mozzarella cheese and lots of vegetable toppings. Try using tomatoes, squash, spinach, broccoli, carrots, cauliflower, and onions. ? Have a variety of cut-up vegetables with a low-fat dip as an appetizer instead of chips and dip. ? Sprinkle sunflower seeds or chopped almonds over salads. Or try adding chopped walnuts or almonds to cooked vegetables. ? Try some vegetarian meals using beans and peas. Add garbanzo or kidney beans to salads. Make burritos and tacos with mashed murrieta beans or black beans. Where can you learn more? Go to https://achvrjeramyeb.healthZoom. org and sign in to your [employer] account. Enter S241 in the [facility] box to learn more about \"DASH Diet: Care Instructions. \"     If you do not have an account, please click on the \"Sign Up Now\" link. Current as of: December 16, 2019               Content Version: 12.6  © 2685-5993 [employer], [employer]. Care instructions adapted under license by [facility] ([facility]). If you have questions about a medical condition or this instruction, always ask your healthcare professional. [place] [address] any warranty or liability for your use of this information. Patient Education        Recombinant Zoster (Shingles) Vaccine: What You Need to Know  Why get vaccinated?   Recombinant zoster (shingles) vaccine can prevent shingles. Shingles (also called herpes zoster, or just zoster) is a painful skin rash, usually with blisters. In addition to the rash, shingles can cause fever, headache, chills, or upset stomach. More rarely, shingles can lead to pneumonia, hearing problems, blindness, brain inflammation (encephalitis), or death. The most common complication of shingles is long-term nerve pain called postherpetic neuralgia (PHN). PHN occurs in the areas where the shingles rash was, even after the rash clears up. It can last for months or years after the rash goes away. The pain from PHN can be severe and debilitating. About 10 to 18% of people who get shingles will experience PHN. The risk of PHN increases with age. An older adult with shingles is more likely to develop PHN and have longer lasting and more severe pain than a younger person with shingles. Shingles is caused by the varicella zoster virus, the same virus that causes chickenpox. After you have chickenpox, the virus stays in your body and can cause shingles later in life. Shingles cannot be passed from one person to another, but the virus that causes shingles can spread and cause chickenpox in someone who had never had chickenpox or received chickenpox vaccine. Recombinant shingles vaccine  Recombinant shingles vaccine provides strong protection against shingles. By preventing shingles, recombinant shingles vaccine also protects against PHN. Recombinant shingles vaccine is the preferred vaccine for the prevention of shingles. However, a different vaccine, live shingles vaccine, may be used in some circumstances. The recombinant shingles vaccine is recommended for adults 50 years and older without serious immune problems. It is given as a two-dose series. This vaccine is also recommended for people who have already gotten another type of shingles vaccine, the live shingles vaccine.  There is no live virus in this vaccine. Shingles vaccine may be given at the same time as other vaccines. Talk with your health care provider  Tell your vaccine provider if the person getting the vaccine:  · Has had an allergic reaction after a previous dose of recombinant shingles vaccine, or has any severe, life-threatening allergies. · Is pregnant or breastfeeding. · Is currently experiencing an episode of shingles. In some cases, your health care provider may decide to postpone shingles vaccination to a future visit. People with minor illnesses, such as a cold, may be vaccinated. People who are moderately or severely ill should usually wait until they recover before getting recombinant shingles vaccine. Your health care provider can give you more information. Risks of a vaccine reaction  · A sore arm with mild or moderate pain is very common after recombinant shingles vaccine, affecting about 80% of vaccinated people. Redness and swelling can also happen at the site of the injection. · Tiredness, muscle pain, headache, shivering, fever, stomach pain, and nausea happen after vaccination in more than half of people who receive recombinant shingles vaccine. In clinical trials, about 1 out of 6 people who got recombinant zoster vaccine experienced side effects that prevented them from doing regular activities. Symptoms usually went away on their own in 2 to 3 days. You should still get the second dose of recombinant zoster vaccine even if you had one of these reactions after the first dose. People sometimes faint after medical procedures, including vaccination. Tell your provider if you feel dizzy or have vision changes or ringing in the ears. As with any medicine, there is a very remote chance of a vaccine causing a severe allergic reaction, other serious injury, or death. What if there is a serious problem? An allergic reaction could occur after the vaccinated person leaves the clinic.  If you see signs of a severe allergic reaction (hives, swelling of the face and throat, difficulty breathing, a fast heartbeat, dizziness, or weakness), call 9-1-1 and get the person to the nearest hospital.  For other signs that concern you, call your health care provider. Adverse reactions should be reported to the Vaccine Adverse Event Reporting System (VAERS). Your health care provider will usually file this report, or you can do it yourself. Visit the VAERS website at www.vaers. [facility].gov or call 0-972.724.8987. VAERS is only for reporting reactions, and VAERS staff do not give medical advice. How can I learn more? · Ask your health care provider. · Call your local or state health department. · Contact the Centers for Disease Control and Prevention (CDC):  ? Call 6-207.469.5833 (1-800-CDC-INFO) or  ? Visit CDC's website at www.cdc.gov/vaccines  Vaccine Information Statement  Recombinant Zoster Vaccine  10/30/2019  [facility] and [facility] for Disease Control and Prevention  Many Vaccine Information Statements are available in [place]an and other languages. See www.immunize.org/vis. Hojas de Información Sobre Vacunas están disponibles en Español y en muchos otros idiomas. Visite Mallory.si   Care instructions adapted under license by [facility] ([facility]). If you have questions about a medical condition or this instruction, always ask your healthcare professional. [place] [address] any warranty or liability for your use of this information. Patient Education        Learning About Venous Insufficiency  What is it? Venous insufficiency is a problem with the flow of blood from the veins of the legs back to the heart. It's also called chronic venous insufficiency or chronic venous stasis. Your veins bring blood back to the heart after it flows through your body. Veins have valves that keep the blood moving in one direction--toward the heart.  But with venous insufficiency, the veins of the having problems. It's also a good idea to know your test results and keep a list of the medicines you take. Current as of: March 4, 2020               Content Version: 12.6  © 2006-2020 [employer], [employer]. Care instructions adapted under license by [facility] [address] If you have questions about a medical condition or this instruction, always ask your healthcare professional. [address] any warranty or liability for your use of this information. Patient Education        Learning About Vitamin D  Why is it important to get enough vitamin D? Your body needs vitamin D to absorb calcium. Calcium keeps your bones and muscles, including your heart, healthy and strong. If your muscles don't get enough calcium, they can cramp, hurt, or feel weak. You may have long-term (chronic) muscle aches and pains. If you don't get enough vitamin D throughout life, you have an increased chance of having thin and brittle bones (osteoporosis) in your later years. Children who don't get enough vitamin D may not grow as much as others their age. They also have a chance of getting a rare disease called rickets. It causes weak bones. Vitamin D and calcium are added to many foods. And your body uses sunshine to make its own vitamin D. How much vitamin D do you need? The [place] of Medicine recommends that people ages 3 through 79 get 600 IU (international units) every day. Adults 71 and older need 800 IU every day. Blood tests for vitamin D can check your vitamin D level. But there is no standard normal range used by all laboratories. You're likely getting enough vitamin D if your levels are in the range of 20 to 50 ng/mL. How can you get more vitamin D? Foods that contain vitamin D include:  · [place], tuna, and mackerel. These are some of the best foods to eat when you need to get more vitamin D.  · Cheese, egg yolks, and beef liver. These foods have vitamin D in small amounts.   · Milk, soy drinks, orange juice, yogurt, margarine, and some kinds of cereal have vitamin D added to them. Some people don't make vitamin D as well as others. They may have to take extra care in getting enough vitamin D. Things that reduce how much vitamin D your body makes include:  · Dark skin, such as many  Americans have. · Age, especially if you are older than 72. · Digestive problems, such as Crohn's or celiac disease. · Liver and kidney disease. Some people who do not get enough vitamin D may need supplements. Are there any risks from taking vitamin D?  · Too much vitamin D:  ? Can damage your kidneys. ? Can cause nausea and vomiting, constipation, and weakness. ? Raises the amount of calcium in your blood. If this happens, you can get confused or have an irregular heart rhythm. · Vitamin D may interact with other medicines. Tell your doctor about all of the medicines you take, including over-the-counter drugs, herbs, and pills. Tell your doctor about all of your current medical problems. Where can you learn more? Go to https://GAIN Fitness.The Kitchen Hotline. org and sign in to your Rule. account. Enter [id number] in the [facility] box to learn more about \"Learning About Vitamin D. \"     If you do not have an account, please click on the \"Sign Up Now\" link. Current as of: August 22, 2019               Content Version: 12.6  © 6377-9181 [employer], [employer]orporated. Care instructions adapted under license by [facility] ([facility]). If you have questions about a medical condition or this instruction, always ask your healthcare professional. [place] [address] any warranty or liability for your use of this information. Patient Education        Well Visit, Women 48 to 72: Care Instructions  Your Care Instructions     Physical exams can help you stay healthy. Your doctor has checked your overall health and may have suggested ways to take good care of yourself.  He or she also may have recommended tests. At home, you can help prevent illness with healthy eating, regular exercise, and other steps. Follow-up care is a key part of your treatment and safety. Be sure to make and go to all appointments, and call your doctor if you are having problems. It's also a good idea to know your test results and keep a list of the medicines you take. How can you care for yourself at home? · Reach and stay at a healthy weight. This will lower your risk for many problems, such as obesity, diabetes, heart disease, and high blood pressure. · Get at least 30 minutes of exercise on most days of the week. Walking is a good choice. You also may want to do other activities, such as running, swimming, cycling, or playing tennis or team sports. · Do not smoke. Smoking can make health problems worse. If you need help quitting, talk to your doctor about stop-smoking programs and medicines. These can increase your chances of quitting for good. · Protect your skin from too much sun. When you're outdoors from 10 a.m. to 4 p.m., stay in the shade or cover up with clothing and a hat with a wide brim. Wear sunglasses that block UV rays. Even when it's cloudy, put broad-spectrum sunscreen (SPF 30 or higher) on any exposed skin. · See a dentist one or two times a year for checkups and to have your teeth cleaned. · Wear a seat belt in the car. Follow your doctor's advice about when to have certain tests. These tests can spot problems early. · Cholesterol. Your doctor will tell you how often to have this done based on your age, family history, or other things that can increase your risk for heart attack and stroke. · Blood pressure. Have your blood pressure checked during a routine doctor visit. Your doctor will tell you how often to check your blood pressure based on your age, your blood pressure results, and other factors.   · Mammogram. Ask your doctor how often you should have a mammogram, which is an X-ray of your breasts. A mammogram can spot breast cancer before it can be felt and when it is easiest to treat. · Pap test and pelvic exam. Ask your doctor how often you should have a Pap test. You may not need to have a Pap test as often as you used to. · Vision. Have your eyes checked every year or two or as often as your doctor suggests. Some experts recommend that you have yearly exams for glaucoma and other age-related eye problems starting at age 48. · Hearing. Tell your doctor if you notice any change in your hearing. You can have tests to find out how well you hear. · Diabetes. Ask your doctor whether you should have tests for diabetes. · Colorectal cancer. Your risk for colorectal cancer gets higher as you get older. Some experts say that adults should start regular screening at age 48 and stop at age 76. Others say to start before age 48 or continue after age 76. Talk with your doctor about your risk and when to start and stop screening. · Thyroid disease. Talk to your doctor about whether to have your thyroid checked as part of a regular physical exam. Women have an increased chance of a thyroid problem. · Osteoporosis. You should begin tests for bone density at age 72. If you are younger than 72, ask your doctor whether you have factors that may increase your risk for this disease. You may want to have this test before age 72. · Heart attack and stroke risk. At least every 4 to 6 years, you should have your risk for heart attack and stroke assessed. Your doctor uses factors such as your age, blood pressure, cholesterol, and whether you smoke or have diabetes to show what your risk for a heart attack or stroke is over the next 10 years. When should you call for help? Watch closely for changes in your health, and be sure to contact your doctor if you have any problems or symptoms that concern you. Where can you learn more? Go to https://levi.healthIEV. org and sign in to your [employer] account. Enter S947 in the [facility] box to learn more about \"Well Visit, Women 50 to 72: Care Instructions. \"     If you do not have an account, please click on the \"Sign Up Now\" link. Current as of: May 27, 2020               Content Version: 12.6  © 6892-3148 [employer], [employer]. Care instructions adapted under license by [facility] ([facility]). If you have questions about a medical condition or this instruction, always ask your healthcare professional. [address] any warranty or liability for your use of this information.

## 2021-02-12 NOTE — PROGRESS NOTES
lungs 2 times daily. montelukast (SINGULAIR) 10 MG TABS Take 1 Tab by mouth daily. valsartan (DIOVAN) 80 MG tablet Take 1 tablet by mouth daily   ondansetron (ZOFRAN) 4 MG tablet Take 1 tablet by mouth every 6 hours as needed for Nausea or Vomiting   hydrocortisone 2.5 % cream Apply to the eyebrows daily if needed for scaling. Use no more than 2 weeks at a time. Cholecalciferol (VITAMIN D) 2000 UNIT TABS Take  by mouth daily. Allergies   Allergen Reactions    Adhesive Tape      Red skin    Diflucan [Fluconazole] Other (See Comments)     Central nervous system    Flagyl [Metronidazole Hcl]      Central nervous system    Sulfacetamide Sodium-Sulfur Dermatitis         Substance Use History     Social History     Tobacco Use    Smoking status: Never Smoker    Smokeless tobacco: Never Used   Substance Use Topics    Alcohol use: Yes     Comment: Rare    Drug use: No          Family History     Family History   Problem Relation Age of Onset    Arthritis Mother     Heart Disease Mother     Cancer Father         prostate    Diabetes Sister           REVIEW OF SYSTEMS:    CONSTITUTIONAL: Neg fever, chills, anorexia, weight changes. She is doing Noom.  + Sleeping difficulties, known GAYATHRI, uses CPAP. Saw CNP 1/20/2021-- no changes  EYES: Neg  Blurry vision, loss of vision, double vision, itching, eye pain. EARS:  Neg  tinnitus, vertigo, discharge or otalgia, decreased hearing  NOSE:  Neg itching, epistaxis, runny nose, or snoring  MOUTH/THROAT:  Neg Bleeding gums, hoarseness, sore throat, dysphagia, throat infections, or dentures  RESPIRATORY:   Neg SOB ,wheeze, cough, sputum, hemoptysis. No report of + TB test.  + Asthma, Allergies. + GAYATHRI, + CPAP--no changes. She is compliant   CARDIOVASCULAR:  Neg Chest pain, palpitations,  dyspnea on exertion, orthopnea, paroxysmal nocturnal dyspnea, or claudication.  + Leg swelling bilaterally due to venous insufficiency, improved with compression stockings. She wears stockings more.   + Heart murmur. GASTROINTESTINAL:  Neg   Nausea, vomiting, or diarrhea, constipation, hematemesis, heart burn, dysphagia,change in bowel movements or stool caliber, hematochezia, melena, abdominal pain, or food intolerance. Colonoscopy: Yes,  + She avoids gluten in her diet which helps with diarrhea. She did not test positive for celiac. GENITOURINARY:   Neg  Urinary frequency, hesitancy, polyuria, dysuria, hematuria, nocturia, incontinence, change in stream, genital pain or swelling, kidney stones, STD's. PAP/LUNA: Yes. History of overactive bladder, Gyn changed her to [address] Myrbetriq increased her BP  HEMATOLOGIC/LYMPHATIC:  Neg bleeding dyscrasias, blood clots (DVT/PE), transfusions, or enlarged lymph nodes H/O easy bruising and anemia  MUSCULOSKELETAL:    + neck pain. Seeing Dr. Sourav Meredith at [facility]. Patient has fibromyalgia and does have diffuse body aches and fatigue. She remains on Cymbalta  NEUROLOGICAL:  Neg  Loss of Consciousness, memeory loss or forgetfulness, confusion, difficulty concentrating, seizures, insomina, aphasia or dysarthria unilateral weakness or paresthesias, ataxia, headaches, syncope, tremor, or H/O head trauma. PSYCHIATRIC:  Pos  H/O depression and anxiety, mood has been better, seeing a therapist.   Neg  personality changes, nervousness, drug or alcohol use/abuse, H/O psych counseling: YES, Psychotropics: Yes, Cymbalta  SKIN :  Neg  Rash, nail changes, sun burns, tattoos, change in moles, or skin color changes  ENDOCRINE:  Neg  Polydipsia, polyuria, abnormal weight changes,heat /cold intolerance, Hair changes. No H/O Diabetes or Thyroid disease.      Preventive Care:    Health Maintenance   Topic Date Due    Shingles Vaccine (1 of 2) 11/26/2010    A1C test (Diabetic or Prediabetic)  11/27/2020    Potassium monitoring  11/27/2020    Creatinine monitoring  11/27/2020    Breast cancer screen  01/24/2022    Cervical cancer screen  12/29/2022    Lipid screen  11/27/2024    Colon cancer screen colonoscopy  01/18/2026    DTaP/Tdap/Td vaccine (3 - Td) 11/27/2029    Flu vaccine  Completed    Pneumococcal 0-64 years Vaccine  Completed    Hepatitis C screen  Completed    HIV screen  Completed    Hepatitis A vaccine  Aged Out    Hepatitis B vaccine  Aged Out    Hib vaccine  Aged Out    Meningococcal (ACWY) vaccine  Aged Out      Hx abnormal PAP: no  Sexual activity: single partner, contraception-- vasectomy,   Self-breast exams: sometimes  Last eye exam: 09/2019, normal, Glasses  Exercise: No regular exercise. Seatbelt use: Yes  Sun Screen: Yes  Dentist: Ainsley Burnham, every 6 months      Physical Exam    Vitals:    02/12/21 1129   BP: 126/80   Pulse: 74   Resp: 12   Temp: 98.2 °F (36.8 °C)   Weight: 224 lb (101.6 kg)   Height: 5' 2\" (1.575 m)     Body mass index is 40.97 kg/m². Wt Readings from Last 3 Encounters:   02/12/21 224 lb (101.6 kg)   11/27/19 220 lb (99.8 kg)   05/21/19 216 lb (98 kg)     BP Readings from Last 3 Encounters:   02/12/21 126/80   12/26/19 (!) 148/80   11/27/19 136/70         GEN:  61 y.o. female who is in NAD, A&O. She appears stated age and well nourished. She is cooperative and pleasant. Obese  HEAD:  NC/AT, no lesions. EYES:  GARCIA, EOMI, No scleral icterus or conjunctival injection or discharge. Visual fields in tact to confrontation. EARS:  EAC's clear, TM's normal.  NOSE:  Nasal mucosa pale and engorged. MOUTH & THROAT:  Oral cavity is clear without mucosal lesions. Tongue is midline. Dentition is in good repair. No pharyngeal erythema or exudate. NECK:  Supple. Full ROM. Trachea is midline. No increased JVD. No thyromegaly or nodules. No masses  LYMPH: No C/SC/A/F nodes  CARDIAC:  S1S2 NL. Regular rhythm. No murmur/clicks/rubs. PMI is non-displaced. Soft systolic murmur. VASC:  Pedal pulses 2/4. Carotid upstrokes 2+. No bruits noted. PULM:  Lungs are CTA.   Symmetric breath sounds noted.  No wheezing or rales  GI:  Abdomen is soft and nontender. No distension. No organomegaly. No masses. No pulsatile masses. EXT:  No Cyanosis or clubbing. 1+ LE pitting edema. Compression stockings on. SKIN: Warm and dry, normal turgor, no rash or lesions of concern. NEURO:  Cranial nerves 2-12 are NL. Speech fluent and coherent. Strength is 5/5 in all muscle groups. Reflexes 2/4 and symmetric. MS:   No joint effusions. Full joint ROM. PSYCH:  Mood and affect NL. Judgement and insight NL. Assessment/Plan:  Celestina Chanel was seen today for annual exam.    Diagnoses and all orders for this visit:    Annual physical exam  --All care gaps identified and addressed  --Counseled patient on need to start moving more. We talked about the need for more physical activity. We talked about calorie restriction along with focusing on some carbohydrate control as well as low-fat food choices. -     EKG 12 Lead  -     CBC Auto Differential; Future  -     Comprehensive Metabolic Panel; Future  -     Lipid Panel; Future  -     Hemoglobin A1C; Future  -     TSH without Reflex; Future  -     Vitamin D 25 Hydroxy; Future  -     Vitamin B12 & Folate; Future    Benign essential HTN  -     CBC Auto Differential; Future  -     Comprehensive Metabolic Panel; Future  -     Lipid Panel; Future  -     TSH without Reflex; Future    Venous insufficiency of both lower extremities  --Advised patient to continue wearing her compression stockings but try to wear them every day. B12 deficiency  -     Vitamin B12 & Folate; Future    Folate deficiency  -     Vitamin B12 & Folate; Future    Vitamin D deficiency  -     Vitamin D 25 Hydroxy; Future    Impaired fasting glucose  -     Comprehensive Metabolic Panel; Future  -     Hemoglobin A1C; Future    Need for shingles vaccine  -     zoster recombinant adjuvanted vaccine [facility]) 50 MCG/0.5ML SUSR injection;  Inject 0.5 mLs into the muscle See Admin Instructions 1 dose now and repeat in 2-6 months    Fibromyalgia  Advised patient to schedule a follow-up appointment to discuss other alternatives that may be helpful in treating her fibromyalgia discomfort               Preventive plan of care for Kelly Greenfield        2/12/2021           Preventive Measures Status       Recommendations for screening   Colon Cancer Screen   Last colonoscopy: 01/18/2016, Dr. Gautam Diallo Colonoscopy due every 10 years--2026    Breast Cancer Screen  Last mammogram: 1/24/2020 Repeat yearly. Overdue   Cervical Cancer Screen   Last PAP smear: 12/29/2017 Repeat every 3 years or as advised by gynecologist.  Eddie Esquivel still require a pelvic, rectal, and breast exam yearly. Osteoporosis Screen   Last DXA scan: None Test is due at age 72. Diabetes Screen  Glucose (mg/dL)   Date Value   11/27/2019 92    Test recommended and ordered. Cholesterol Screen   Lab Results   Component Value Date    CHOL 185 11/27/2019    TRIG 82 11/27/2019    HDL 67 (H) 11/27/2019    LDLCALC 102 (H) 11/27/2019    Test recommended and ordered   HIV screening recommended for those ages 12-76 NO MATTER THE RISK FOR HIV--  9/9/2016   No need to repeat at this time    Hepatitis C screening recommended for those between the ages of 25 and 79--9/9/2016  No need to repeat at this time    Aspirin for Cardiovascular Prevention   No  aspirin not indicated at this time due to your low 10-year atherosclerotic cardiovascular disease risk score. Weight: Body mass index is 40.97 kg/m². 5' 2\" (1.575 m)224 lb (101.6 kg)    Your BMI is 25 or greater, which indicates that you are overweight with increased risk of cardiovascular disease. Continue aggressive lifestyle modifications including a low-fat, portion controlled diet along with regular aerobic exercise.   Consider bariatric consultation    Recommended Immunizations    Immunization History   Administered Date(s) Administered    Influenza Virus Vaccine 02/25/2016, 09/22/2019, 10/17/2020    Influenza, Quadv, IM, PF (6 mo and older Fluzone, Flulaval, Fluarix, and 3 yrs and older Afluria) 09/06/2016, 11/28/2017, 11/29/2018    Pneumococcal Polysaccharide ([id number]) 02/25/2016    Tdap (Boostrix, Adacel) 08/11/2009, 11/27/2019        Influenza vaccine:  recommended every fall    Pneumonia vaccine: Pneumovax due at age 72    Tetanus vaccine:  tetanus and diptheria/Pertussis vaccine (Td/Tdap) recommended every 10 years- Tdap is due 2029    Shingles vaccine:  Shingrx is due. Check pharmacies for availability         Additional Recommendations   1. Use sunscreen daily to help reduce the risk of skin cancer  2. Continue working on lifestyle modification including a calorie restricted and portion control diet focusing on low-fat options as well as low carb options. 3. Update your eye exam every 2 years to screen for glaucoma, cataracts, and macular degeneration  4. Always wear a seatbelt while riding in a car        Here are a few  Reliable websites with a variety of health and wellness information:   www.mylifecheck. heart. org     www.nutritionsource. org     www. americanheart. org     www. diabetes. org      www.menopause. org     www.[facility]     www.[address] [place] [address])        The 10-year ASCVD risk score (Thurman Boeck., et al., 2013) is: 5.4%    Values used to calculate the score:      Age: 61 years      Sex: Female      Is Non- : Yes      Diabetic: No      Tobacco smoker: No      Systolic Blood Pressure: 715 mmHg      Is BP treated: Yes      HDL Cholesterol: 67 mg/dL      Total Cholesterol: 185 mg/dL

## 2021-02-13 LAB
ESTIMATED AVERAGE GLUCOSE: 119.8 MG/DL
HBA1C MFR BLD: 5.8 %

## 2021-02-16 ENCOUNTER — HOSPITAL ENCOUNTER (OUTPATIENT)
Dept: MAMMOGRAPHY | Age: 61
Discharge: HOME | End: 2021-02-16
Payer: COMMERCIAL

## 2021-02-16 DIAGNOSIS — Z12.31 ENCOUNTER FOR SCREENING MAMMOGRAM FOR BREAST CANCER: ICD-10-CM

## 2021-02-16 PROCEDURE — 77063 BREAST TOMOSYNTHESIS BI: CPT

## 2021-02-18 DIAGNOSIS — I10 BENIGN ESSENTIAL HTN: ICD-10-CM

## 2021-02-18 RX ORDER — VALSARTAN 80 MG/1
80 TABLET ORAL DAILY
Qty: 30 TABLET | Refills: 5 | Status: SHIPPED | OUTPATIENT
Start: 2021-02-18 | End: 2021-08-18 | Stop reason: SDUPTHER

## 2021-05-06 RX ORDER — DULOXETIN HYDROCHLORIDE 60 MG/1
CAPSULE, DELAYED RELEASE ORAL
Qty: 90 CAPSULE | Refills: 1 | Status: SHIPPED | OUTPATIENT
Start: 2021-05-06 | End: 2021-11-09

## 2021-07-30 LAB
CANDIDA SPECIES, DNA PROBE: ABNORMAL
GARDNERELLA VAGINALIS, DNA PROBE: ABNORMAL
TRICHOMONAS VAGINALIS DNA: ABNORMAL

## 2021-08-18 ENCOUNTER — OFFICE VISIT (OUTPATIENT)
Dept: INTERNAL MEDICINE CLINIC | Age: 61
End: 2021-08-18
Payer: COMMERCIAL

## 2021-08-18 VITALS
BODY MASS INDEX: 40.93 KG/M2 | OXYGEN SATURATION: 97 % | RESPIRATION RATE: 14 BRPM | WEIGHT: 223.8 LBS | DIASTOLIC BLOOD PRESSURE: 68 MMHG | SYSTOLIC BLOOD PRESSURE: 112 MMHG | HEART RATE: 82 BPM

## 2021-08-18 DIAGNOSIS — I87.2 VENOUS INSUFFICIENCY OF BOTH LOWER EXTREMITIES: ICD-10-CM

## 2021-08-18 DIAGNOSIS — M50.30 DDD (DEGENERATIVE DISC DISEASE), CERVICAL: ICD-10-CM

## 2021-08-18 DIAGNOSIS — M79.7 FIBROMYALGIA: ICD-10-CM

## 2021-08-18 DIAGNOSIS — E53.8 FOLATE DEFICIENCY: ICD-10-CM

## 2021-08-18 DIAGNOSIS — J45.20 MILD INTERMITTENT ASTHMA WITHOUT COMPLICATION: ICD-10-CM

## 2021-08-18 DIAGNOSIS — N32.81 OAB (OVERACTIVE BLADDER): ICD-10-CM

## 2021-08-18 DIAGNOSIS — E53.8 B12 DEFICIENCY: ICD-10-CM

## 2021-08-18 DIAGNOSIS — E55.9 VITAMIN D DEFICIENCY: ICD-10-CM

## 2021-08-18 DIAGNOSIS — R73.01 IMPAIRED FASTING GLUCOSE: ICD-10-CM

## 2021-08-18 DIAGNOSIS — Z23 NEED FOR SHINGLES VACCINE: ICD-10-CM

## 2021-08-18 DIAGNOSIS — I10 BENIGN ESSENTIAL HTN: Primary | ICD-10-CM

## 2021-08-18 PROCEDURE — 99214 OFFICE O/P EST MOD 30 MIN: CPT | Performed by: INTERNAL MEDICINE

## 2021-08-18 PROCEDURE — 90471 IMMUNIZATION ADMIN: CPT | Performed by: INTERNAL MEDICINE

## 2021-08-18 PROCEDURE — 90750 HZV VACC RECOMBINANT IM: CPT | Performed by: INTERNAL MEDICINE

## 2021-08-18 RX ORDER — VALSARTAN 80 MG/1
80 TABLET ORAL DAILY
Qty: 90 TABLET | Refills: 3 | Status: SHIPPED | OUTPATIENT
Start: 2021-08-18 | End: 2022-08-02

## 2021-08-18 RX ORDER — TIZANIDINE 4 MG/1
TABLET ORAL
Qty: 90 TABLET | Refills: 1 | Status: SHIPPED | OUTPATIENT
Start: 2021-08-18 | End: 2022-02-03

## 2021-08-18 RX ORDER — SOLIFENACIN SUCCINATE 10 MG/1
10 TABLET, FILM COATED ORAL DAILY
COMMUNITY

## 2021-08-18 NOTE — PROGRESS NOTES
[facility]) Physicians  Internal Medicine  Patient Encounter  Daniel Escobar D.O., [facility]        Chief Complaint   Patient presents with    Hypertension       HPI: 61 y.o. female with multiple medical issues who presents today for follow up regarding the status of those issues listed below along with a medication review. Health maintenance items or overdue:  Shingles vaccine    Asthma-- She sees Dr. Inge Bhakta. She has not needed her rescue inhaler. She denies any cough or wheezing, nocturnal cough or impairment of activity. She is on Asmanex regularly along with Singulair. Depression/Anxiety/fibromyalgia--patient states her mood is under good control. The Cymbalta was also added for fibromyalgia which she feels is under reasonable control. She still has periods of increased pain. Vit D def--She is on Vit D3 2000 units daily. Folate Def--Patient is no longer on f folic acid supplement. She thought she could stop when level normalized. B12 Def--She is no longer taking her vitamin B12 injections. Her last level was 266. Venous insufficiency--Patient does wear the compression stockings daily. The swelling is much better when she has not been on her feet for about three days. GAYATHRI-- She is compliant with her CPAP. Energy level is good. OAB--Patient is no longer on Myrbetriq. This was stopped due to higher BP's. She is now on Vesicare. HTN-- She is exercising regularly, 3-4 times per week. She works with a  twice per week. She denies lightheadedness or syncope. She denies headaches. IFG-- Pt denies poly's. No weight changes. She is exercising.     Lab Results   Component Value Date    LABA1C 5.8 02/12/2021     Lab Results   Component Value Date    .8 02/12/2021          Past Medical History:   Diagnosis Date    Allergic rhinitis     Anxiety     Asthma     Depression     Gastritis     GERD (gastroesophageal reflux disease)     Hiatal hernia     Impaired fasting glucose 2/12/2021    Iron defic anemia     OAB (overactive bladder) 1/30/2019    Obesity     GAYATHRI (obstructive sleep apnea) 2009    Unspecified sleep apnea     cpap    Venous insufficiency of both lower extremities 9/6/2016    Vitamin B12 deficiency     Vitamin D deficiency      Medication Sig   solifenacin (VESICARE) 10 MG tablet Take 10 mg by mouth daily   DULoxetine (CYMBALTA) 60 MG extended release capsule TAKE 1 CAPSULE BY MOUTH EVERY DAY   valsartan (DIOVAN) 80 MG tablet TAKE 1 TABLET BY MOUTH DAILY   meloxicam (MOBIC) 15 MG tablet Take 15 mg by mouth daily   tiZANidine (ZANAFLEX) 4 MG tablet TAKE 1 TABLET BY MOUTH EVERY NIGHT   potassium chloride (KLOR-CON M) 20 MEQ extended release tablet TAKE 1 TABLET BY MOUTH DAILY if taking Lasix   furosemide (LASIX) 40 MG tablet Take 1 tablet by mouth daily as needed (swelling)   hydrocortisone 2.5 % cream Apply to the eyebrows daily if needed for scaling. Use no more than 2 weeks at a time. Azelastine-Fluticasone (DYMISTA NA) by Nasal route daily. Multiple Vitamin (MULTIVITAMIN PO) Take  by mouth daily. levocetirizine (XYZAL) 5 MG tablet Take 5 mg by mouth nightly. Cholecalciferol (VITAMIN D) 2000 UNIT TABS Take  by mouth daily. mometasone (ASMANEX) 220 MCG/INH inhaler Inhale 1 puff into the lungs 2 times daily. montelukast (SINGULAIR) 10 MG TABS Take 1 Tab by mouth daily. Review of Systems -       OBJECTIVE:  Vitals:    08/18/21 0759   BP: 112/68   Pulse: 82   Resp: 14   SpO2: 97%   Weight: 223 lb 12.8 oz (101.5 kg)     Body mass index is 40.93 kg/m². Wt Readings from Last 3 Encounters:   08/18/21 223 lb 12.8 oz (101.5 kg)   02/12/21 224 lb (101.6 kg)   11/27/19 220 lb (99.8 kg)     BP Readings from Last 3 Encounters:   08/18/21 112/68   02/12/21 126/80   12/26/19 (!) 148/80        GEN: NAD, A&O, Non-toxic, Obese  HEENT: NC/AT, JUDY, EOMI, Anicteric  NECK: Supple. Large neck circumference. No JVD.   No masses  LYMPH: No C/SC nodes. CV: Regular rhythm, Rate NL, No murmurs, No ectopy. PULM: CTA, No wheezing  EXT: 1+ LE edema, pitting, + Lipedema. GI: Abdomen is soft, NT, BS+, No hepatomegaly. NEURO: No focal or lateralizing deficits. No ataxia  VASC:  No carotid bruits. Pulses symmet  SKIN:  No rashes of concern. MS: No synovitis. ASSESSMENT/PLAN:    1. Benign essential HTN  Blood pressure is well controlled  Continue valsartan  Continue to monitor for hypotension. Blood pressure may decrease as she continues to exercise and engage in lifestyle modification. Check electrolytes and renal function  - valsartan (DIOVAN) 80 MG tablet; Take 1 tablet by mouth daily  Dispense: 90 tablet; Refill: 3  - CBC Auto Differential; Future  - Comprehensive Metabolic Panel; Future  - Lipid Panel; Future    2. DDD (degenerative disc disease), cervical  Under good control  Refill tizanidine  Patient follows with [facility]  - tiZANidine (ZANAFLEX) 4 MG tablet; TAKE 1 TABLET BY MOUTH EVERY NIGHT  Dispense: 90 tablet; Refill: 1    3. Fibromyalgia  Condition is fairly stable  Continue tizanidine nightly and as needed  Continue to ensure good sleep  - tiZANidine (ZANAFLEX) 4 MG tablet; TAKE 1 TABLET BY MOUTH EVERY NIGHT  Dispense: 90 tablet; Refill: 1    4. Venous insufficiency of both lower extremities  Condition is uncontrolled without compression stockings on  Recommend she wear compression every day  Consider reevaluating with a venous reflux study and/or referral to vascular vein specialist    5. B12 deficiency  Condition is untreated at this time  Check lab  Restart injections if needed  - Vitamin B12 & Folate; Future    6. Vitamin D deficiency  Condition control is uncertain  Due for lab  - Vitamin D 25 Hydroxy; Future    7. Folate deficiency  Patient is no longer taking her folic acid  Recheck level  - Vitamin B12 & Folate; Future    8.  Impaired fasting glucose  Condition is stable and asymptomatic  Recheck lab to ensure stability  Continue to engage in Lifestyle modification including low calorie diet focusing on Low fat/low cholesterol and low carbohydrate intake, along with  increasing cardiovascular (aerobic) exercise. Patient counseled  - Hemoglobin A1C; Future  - Comprehensive Metabolic Panel; Future    9. Mild intermittent asthma without complication  Well-controlled  Continue to monitor for respiratory symptoms  Flu vaccine in the fall    10. OAB (overactive bladder)  Condition is well controlled on Vesicare    11.  Need for shingles vaccine    - Zoster recombinant [facility])

## 2021-08-18 NOTE — PATIENT INSTRUCTIONS
Patient Education        Recombinant Zoster (Shingles) Vaccine: What You Need to Know  Why get vaccinated? Recombinant zoster (shingles) vaccine can prevent shingles. Shingles (also called herpes zoster, or just zoster) is a painful skin rash, usually with blisters. In addition to the rash, shingles can cause fever, headache, chills, or upset stomach. More rarely, shingles can lead to pneumonia, hearing problems, blindness, brain inflammation (encephalitis), or death. The most common complication of shingles is long-term nerve pain called postherpetic neuralgia (PHN). PHN occurs in the areas where the shingles rash was, even after the rash clears up. It can last for months or years after the rash goes away. The pain from PHN can be severe and debilitating. About 10 to 18% of people who get shingles will experience PHN. The risk of PHN increases with age. An older adult with shingles is more likely to develop PHN and have longer lasting and more severe pain than a younger person with shingles. Shingles is caused by the varicella zoster virus, the same virus that causes chickenpox. After you have chickenpox, the virus stays in your body and can cause shingles later in life. Shingles cannot be passed from one person to another, but the virus that causes shingles can spread and cause chickenpox in someone who had never had chickenpox or received chickenpox vaccine. Recombinant shingles vaccine  Recombinant shingles vaccine provides strong protection against shingles. By preventing shingles, recombinant shingles vaccine also protects against PHN. Recombinant shingles vaccine is the preferred vaccine for the prevention of shingles. However, a different vaccine, live shingles vaccine, may be used in some circumstances. The recombinant shingles vaccine is recommended for adults 50 years and older without serious immune problems. It is given as a two-dose series.   This vaccine is also recommended for people who have already gotten another type of shingles vaccine, the live shingles vaccine. There is no live virus in this vaccine. Shingles vaccine may be given at the same time as other vaccines. Talk with your health care provider  Tell your vaccine provider if the person getting the vaccine:  · Has had an allergic reaction after a previous dose of recombinant shingles vaccine, or has any severe, life-threatening allergies. · Is pregnant or breastfeeding. · Is currently experiencing an episode of shingles. In some cases, your health care provider may decide to postpone shingles vaccination to a future visit. People with minor illnesses, such as a cold, may be vaccinated. People who are moderately or severely ill should usually wait until they recover before getting recombinant shingles vaccine. Your health care provider can give you more information. Risks of a vaccine reaction  · A sore arm with mild or moderate pain is very common after recombinant shingles vaccine, affecting about 80% of vaccinated people. Redness and swelling can also happen at the site of the injection. · Tiredness, muscle pain, headache, shivering, fever, stomach pain, and nausea happen after vaccination in more than half of people who receive recombinant shingles vaccine. In clinical trials, about 1 out of 6 people who got recombinant zoster vaccine experienced side effects that prevented them from doing regular activities. Symptoms usually went away on their own in 2 to 3 days. You should still get the second dose of recombinant zoster vaccine even if you had one of these reactions after the first dose. People sometimes faint after medical procedures, including vaccination. Tell your provider if you feel dizzy or have vision changes or ringing in the ears. As with any medicine, there is a very remote chance of a vaccine causing a severe allergic reaction, other serious injury, or death. What if there is a serious problem?   An allergic keep the blood moving in one direction--toward the heart. But with venous insufficiency, the veins of the legs might not work as they should. This can allow blood to leak backward. Fluid can pool in the legs. This can lead to problems that include varicose veins. What causes it? Venous insufficiency is sometimes caused by deep vein thrombosis and high blood pressure inside leg veins. You are more likely to have venous insufficiency if you:  · Are older. · Are female. · Are overweight. · Don't get enough physical activity. · Smoke. · Have a family history of varicose veins. What are the symptoms? Symptoms of venous insufficiency affect the legs. They may include:  · Swelling, often in the ankles. · A rash. · Varicose veins. · Itching. · Cramping. · Skin sores (ulcers). · Aching or a feeling of heaviness. · Changes in skin color. How is it diagnosed? Your doctor can diagnose venous insufficiency by examining your legs and by using a type of ultrasound test (duplex Doppler) to find out how well blood is flowing in your legs. How is it treated? To reduce swelling and relieve pain caused by venous insufficiency, you can wear compression stockings. They are tighter at the ankles than at the top of the legs. They also can help venous skin ulcers heal. But there are different types of stockings, and they need to fit right. So your doctor will recommend what you need. You also can try to:  · Get more exercise, especially walking. It can increase blood flow. · Avoid standing still or sitting for a long time, which can make the fluid pool in your legs. · Try not to sit with your legs crossed at the knee. · Keep your legs raised above your heart when you're lying down. This reduces swelling. If these treatments don't work, you may need medicine or a procedure to help relieve symptoms. Procedures might be done to close the vein, to remove the vein, or to improve blood flow.   Follow-up care is a key part of your treatment and safety. Be sure to make and go to all appointments, and call your doctor if you are having problems. It's also a good idea to know your test results and keep a list of the medicines you take. Current as of: November 4, 2020               Content Version: 12.9  © 2006-2021 [employer]. Care instructions adapted under license by [facility] ([facility]). If you have questions about a medical condition or this instruction, always ask your healthcare professional. [address] any warranty or liability for your use of this information.

## 2021-11-09 RX ORDER — DULOXETIN HYDROCHLORIDE 60 MG/1
CAPSULE, DELAYED RELEASE ORAL
Qty: 90 CAPSULE | Refills: 1 | Status: SHIPPED | OUTPATIENT
Start: 2021-11-09 | End: 2022-05-24

## 2021-12-04 ENCOUNTER — OFFICE VISIT (OUTPATIENT)
Dept: INTERNAL MEDICINE CLINIC | Age: 61
End: 2021-12-04
Payer: COMMERCIAL

## 2021-12-04 VITALS
HEIGHT: 62 IN | DIASTOLIC BLOOD PRESSURE: 80 MMHG | SYSTOLIC BLOOD PRESSURE: 126 MMHG | BODY MASS INDEX: 38.64 KG/M2 | OXYGEN SATURATION: 96 % | TEMPERATURE: 97 F | HEART RATE: 82 BPM | WEIGHT: 210 LBS

## 2021-12-04 DIAGNOSIS — M70.61 TROCHANTERIC BURSITIS OF RIGHT HIP: Primary | ICD-10-CM

## 2021-12-04 DIAGNOSIS — M76.31 ILIOTIBIAL BAND TENDINITIS OF RIGHT SIDE: ICD-10-CM

## 2021-12-04 DIAGNOSIS — Z23 NEED FOR INFLUENZA VACCINATION: ICD-10-CM

## 2021-12-04 DIAGNOSIS — M76.01 GLUTEAL TENDINITIS, RIGHT HIP: ICD-10-CM

## 2021-12-04 DIAGNOSIS — Z23 NEED FOR SHINGLES VACCINE: ICD-10-CM

## 2021-12-04 PROCEDURE — 90674 CCIIV4 VAC NO PRSV 0.5 ML IM: CPT | Performed by: INTERNAL MEDICINE

## 2021-12-04 PROCEDURE — 99213 OFFICE O/P EST LOW 20 MIN: CPT | Performed by: INTERNAL MEDICINE

## 2021-12-04 PROCEDURE — 90471 IMMUNIZATION ADMIN: CPT | Performed by: INTERNAL MEDICINE

## 2021-12-04 PROCEDURE — 90472 IMMUNIZATION ADMIN EACH ADD: CPT | Performed by: INTERNAL MEDICINE

## 2021-12-04 PROCEDURE — 90750 HZV VACC RECOMBINANT IM: CPT | Performed by: INTERNAL MEDICINE

## 2021-12-04 SDOH — ECONOMIC STABILITY: FOOD INSECURITY: WITHIN THE PAST 12 MONTHS, YOU WORRIED THAT YOUR FOOD WOULD RUN OUT BEFORE YOU GOT MONEY TO BUY MORE.: NEVER TRUE

## 2021-12-04 SDOH — ECONOMIC STABILITY: FOOD INSECURITY: WITHIN THE PAST 12 MONTHS, THE FOOD YOU BOUGHT JUST DIDN'T LAST AND YOU DIDN'T HAVE MONEY TO GET MORE.: NEVER TRUE

## 2021-12-04 ASSESSMENT — SOCIAL DETERMINANTS OF HEALTH (SDOH): HOW HARD IS IT FOR YOU TO PAY FOR THE VERY BASICS LIKE FOOD, HOUSING, MEDICAL CARE, AND HEATING?: NOT HARD AT ALL

## 2021-12-04 NOTE — PROGRESS NOTES
[facility]) Physicians  Internal Medicine  Patient Encounter  Carmen Farias D.O., [facility]        Chief Complaint   Patient presents with    Hip Pain     RIght hip, unsure if it's a muscle or skeletal        HPI: 64 y.o. female seen today with complaint of right hip pain. She reports that the pain started on the outside of the right thigh. She noticed the pain after starting strength training. She felt better during her exercises. The pain then migrated up to the right lateral hip and gluteal region. She denies low back pain. She also reports groin pain. Symptoms started 2 months ago. Pain is worse with sitting. The pain is present when she starts to walk, but eases after she has been walking. She has more pain after walking. Pain does not radiate past the knee. She denies any paresthesias. She was seen by Dr. Ingris Zavala at [facility] for Plantar Fasciitis that started 3 weeks ago. She does have meloxicam but does not use this all the time.     Past Medical History:   Diagnosis Date    Allergic rhinitis     Anxiety     Asthma     Depression     Gastritis     GERD (gastroesophageal reflux disease)     Hiatal hernia     Impaired fasting glucose 2/12/2021    Iron defic anemia     OAB (overactive bladder) 1/30/2019    Obesity     GAYATHRI (obstructive sleep apnea) 2009    Unspecified sleep apnea     cpap    Venous insufficiency of both lower extremities 9/6/2016    Vitamin B12 deficiency     Vitamin D deficiency          MEDICATIONS:  Medication Sig   DULoxetine (CYMBALTA) 60 MG extended release capsule TAKE 1 CAPSULE BY MOUTH EVERY DAY   solifenacin (VESICARE) 10 MG tablet Take 10 mg by mouth daily   tiZANidine (ZANAFLEX) 4 MG tablet TAKE 1 TABLET BY MOUTH EVERY NIGHT   valsartan (DIOVAN) 80 MG tablet Take 1 tablet by mouth daily   meloxicam (MOBIC) 15 MG tablet Take 15 mg by mouth daily   potassium chloride (KLOR-CON M) 20 MEQ extended release tablet TAKE 1 TABLET BY MOUTH DAILY if taking Lasix furosemide (LASIX) 40 MG tablet Take 1 tablet by mouth daily as needed (swelling)   hydrocortisone 2.5 % cream Apply to the eyebrows daily if needed for scaling. Use no more than 2 weeks at a time. Azelastine-Fluticasone (DYMISTA NA) by Nasal route daily. Multiple Vitamin (MULTIVITAMIN PO) Take  by mouth daily. levocetirizine (XYZAL) 5 MG tablet Take 5 mg by mouth nightly. Cholecalciferol (VITAMIN D) 2000 UNIT TABS Take  by mouth daily. mometasone (ASMANEX) 220 MCG/INH inhaler Inhale 1 puff into the lungs 2 times daily. montelukast (SINGULAIR) 10 MG TABS Take 1 Tab by mouth daily. Review of Systems - As per HPI      OBJECTIVE:  Vitals:    12/04/21 1128   BP: 126/80   Pulse: 82   Temp: 97 °F (36.1 °C)   TempSrc: Temporal   SpO2: 96%   Weight: 210 lb (95.3 kg)   Height: 5' 2\" (1.575 m)     Body mass index is 38.41 kg/m². Wt Readings from Last 3 Encounters:   12/04/21 210 lb (95.3 kg)   08/18/21 223 lb 12.8 oz (101.5 kg)   02/12/21 224 lb (101.6 kg)     BP Readings from Last 3 Encounters:   12/04/21 126/80   08/18/21 112/68   02/12/21 126/80      GEN: NAD, A&O, Non-toxic  MS: +TTP over the right greater trochanter as well as TTP along the length of the IT band. She also has TTP of the Gluteus medius. Range of motion of the right hip is full without discomfort. NEURO: No focal or lateralizing deficits. VASC:  No carotid bruits. Pulses symmetric  SKIN:  No rashes or lesions of concern    ASSESSMENT[de-identified]  Tadeo Colunga was seen today for hip pain.     Diagnoses and all orders for this visit:    Trochanteric bursitis of right hip  -     External Referral To Physicial Medicine and Rehab    Iliotibial band tendinitis of right side  -     External Referral To Physicial Medicine and Rehab    Gluteal tendinitis, right hip  -     External Referral To Physicial Medicine and Rehab    Need for influenza vaccination  -     INFLUENZA, MDCK QUADV, 2 YRS AND OLDER, IM, PF, PREFILL SYR OR SDV, 0.5ML (FLUCELVAX

## 2021-12-04 NOTE — PATIENT INSTRUCTIONS
To do list:      #1 start taking the meloxicam every day with food    #2 begin the specific exercises provided. You may need a formal physical therapy program    #3 you may need a referral to physical medicine and rehabilitation specialist at [facility]. [facility]  Patient Education        Gluteal Strain: Rehab Exercises  Introduction  Here are some examples of exercises for you to try. The exercises may be suggested for a condition or for rehabilitation. Start each exercise slowly. Ease off the exercises if you start to have pain. You will be told when to start these exercises and which ones will work best for you. How to do the exercises  Hip rotator stretch    1. Lie on your back with both knees bent and your feet flat on the floor. 2. Put the ankle of your affected leg on your opposite thigh near your knee. 3. Use your hand to gently push the knee of your affected leg away from your body until you feel a gentle stretch around your hip. 4. Hold the stretch for 15 to 30 seconds. 5. Repeat 2 to 4 times. 6. Repeat steps 1 through 5, but this time use your hand to gently pull your knee toward your opposite shoulder. 7. Switch legs and repeat steps 1 through 6, even if only one hip is sore. Seated hip rotator stretch    1. Sit in a sturdy chair. 2. Cross your affected leg over your knee, resting your foot on top of your knee. 3. Keep your back straight, and slowly lean forward until you feel a stretch in your hip. 4. Hold for 15 to 30 seconds. 5. Switch legs and repeat steps 1 through 4 on your other side. 6. Repeat 2 to 4 times. Hamstring stretch (lying down)    1. Lie flat on your back with your legs straight. If you feel discomfort in your back, place a small towel roll under your lower back. 2. Holding the back of your affected leg, lift your leg straight up and toward your body until you feel a stretch at the back of your thigh. 3. Hold the stretch for at least 30 seconds.   4. Repeat 2 to 4 times.  5. Switch legs and repeat steps 1 through 4, even if only one hip is sore. Bridging    1. Lie on your back with both knees bent. Your knees should be bent about 90 degrees. 2. Then push your feet into the floor, squeeze your buttocks, and lift your hips off the floor until your shoulders, hips, and knees are all in a straight line. 3. Hold for about 6 seconds as you continue to breathe normally, and then slowly lower your hips back down to the floor and rest for up to 10 seconds. 4. Repeat 8 to 12 times. Single-leg bridge    1. Lie on your back, with your arms at your sides. 2. Bend one knee, and keep that foot flat on the floor. The other leg should be straight. 3. Raise the straight leg up so that the knee is level with the bent knee. 4. Tighten your belly muscles by pulling your belly button in toward your spine. Lift your buttocks up and be careful not to let your hips drop down. 5. Hold for about 6 seconds as you continue to breathe normally, and then slowly lower your hips back down to the floor. 6. Switch legs and repeat steps 1 though 5.  7. Repeat 8 to 12 times. Follow-up care is a key part of your treatment and safety. Be sure to make and go to all appointments, and call your doctor if you are having problems. It's also a good idea to know your test results and keep a list of the medicines you take. Where can you learn more? Go to https://Impeto MedicalpeBleacher Report.AdKeeper. org and sign in to your DubMeNow account. Enter U987 in the [facility] box to learn more about \"Gluteal Strain: Rehab Exercises. \"     If you do not have an account, please click on the \"Sign Up Now\" link. Current as of: July 1, 2021               Content Version: 13.0  © 9037-3869 Healthwise, Incorporated. Care instructions adapted under license by [facility] ([facility]).  If you have questions about a medical condition or this instruction, always ask your healthcare professional. [address] any warranty or liability for your use of this information. Patient Education        Hip Bursitis: Care Instructions  Your Care Instructions     Bursitis is inflammation of the bursa. A bursa is a small sac of fluid that cushions a joint and helps it move easily. A bursa sits between a bone in the hip and the muscles and tendons in the thigh and buttock. Injury or overuse of the hip can cause bursitis. Activities that can lead to bursitis include twisting and rapid joint movement. Bursitis can cause hip pain. Bursitis usually gets better if you avoid the activity that caused it. If pain lasts or gets worse despite home treatment, your doctor may draw fluid from the bursa through a needle. This may relieve your pain and help your doctor know if you have an infection. If so, your doctor will prescribe antibiotics. If you have inflammation only, you may get a corticosteroid shot to reduce swelling and pain. Sometimes surgery is needed to drain or remove the bursa. Follow-up care is a key part of your treatment and safety. Be sure to make and go to all appointments, and call your doctor if you are having problems. It's also a good idea to know your test results and keep a list of the medicines you take. How can you care for yourself at home? · Put ice or a cold pack on your hip for 10 to 20 minutes at a time. Put a thin cloth between the ice and your skin. · After 3 days of using ice, you may use heat on your hip. You can use a hot water bottle, a heating pad set on low, or a warm, moist towel. · Rest your hip. Stop any activities that cause pain. Switch to activities that do not stress your hip. · Take your medicines exactly as prescribed. Call your doctor if you think you are having a problem with your medicine. · Ask your doctor if you can take an over-the-counter pain medicine, such as acetaminophen (Tylenol), ibuprofen (Advil, Motrin), or naproxen (Aleve). Be safe with medicines.  Read and follow all instructions on the label. · To prevent stiffness, gently move the hip joint as much as you can without pain every day. As the pain gets better, keep doing range-of-motion exercises. Ask your doctor for exercises that will make the muscles around the hip joint stronger. Do these as directed. · You can slowly return to the activity that caused the pain, but do it with less effort until you can do it without pain or swelling. Be sure to warm up before and stretch after you do the activity. When should you call for help? Call your doctor now or seek immediate medical care if:    · You have a fever.     · You have increased swelling or redness in your hip.     · You cannot use your hip, or the pain in your hip gets worse. Watch closely for changes in your health, and be sure to contact your doctor if:    · You have pain for 2 weeks or longer despite home treatment. Where can you learn more? Go to https://Business e via [place].Ecelles Carson. org and sign in to your Global Velocity account. Enter R502 in the Vers[employer] box to learn more about \"Hip Bursitis: Care Instructions. \"     If you do not have an account, please click on the \"Sign Up Now\" link. Current as of: July 1, 2021               Content Version: 13.0  © 2006-2021 GamePlan Technologies. Care instructions adapted under license by [facility] ([facility]). If you have questions about a medical condition or this instruction, always ask your healthcare professional. [place] [address] any warranty or liability for your use of this information. Patient Education        Hip Bursitis: Exercises  Introduction  Here are some examples of exercises for you to try. The exercises may be suggested for a condition or for rehabilitation. Start each exercise slowly. Ease off the exercises if you start to have pain. You will be told when to start these exercises and which ones will work best for you.   How to do the exercises  Hip rotator stretch    1. Lie on your back with both knees bent and your feet flat on the floor. 2. Put the ankle of your affected leg on your opposite thigh near your knee. 3. Use your hand to gently push your knee away from your body until you feel a gentle stretch around your hip. 4. Hold the stretch for 15 to 30 seconds. 5. Repeat 2 to 4 times. 6. Repeat steps 1 through 5, but this time use your hand to gently pull your knee toward your opposite shoulder. Iliotibial band stretch    1. Lean sideways against a wall. If you are not steady on your feet, hold on to a chair or counter. 2. Stand on the leg with the affected hip, with that leg close to the wall. Then cross your other leg in front of it. 3. Let your affected hip drop out to the side of your body and against wall. Then lean away from your affected hip until you feel a stretch. 4. Hold the stretch for 15 to 30 seconds. 5. Repeat 2 to 4 times. Straight-leg raises to the outside    1. Lie on your side, with your affected hip on top. 2. Tighten the front thigh muscles of your top leg to keep your knee straight. 3. Keep your hip and your leg straight in line with the rest of your body, and keep your knee pointing forward. Do not drop your hip back. 4. Lift your top leg straight up toward the ceiling, about 12 inches off the floor. Hold for about 6 seconds, then slowly lower your leg. 5. Repeat 8 to 12 times. Clamshell    1. Lie on your side, with your affected hip on top and your head propped on a pillow. Keep your feet and knees together and your knees bent. 2. Raise your top knee, but keep your feet together. Do not let your hips roll back. Your legs should open up like a clamshell. 3. Hold for 6 seconds. 4. Slowly lower your knee back down. Rest for 10 seconds. 5. Repeat 8 to 12 times. Follow-up care is a key part of your treatment and safety. Be sure to make and go to all appointments, and call your doctor if you are having problems.  It's also a good idea to know your test results and keep a list of the medicines you take. Where can you learn more? Go to https://chpepiceweb.healthTravel Distribution [email or web address] org and sign in to your [facility] account. Enter K224 in the [facility] box to learn more about \"Hip Bursitis: Exercises. \"     If you do not have an account, please click on the \"Sign Up Now\" link. Current as of: July 1, 2021               Content Version: 13.0  © 2006-2021 Microarrays. Care instructions adapted under license by [facility] ([facility]). If you have questions about a medical condition or this instruction, always ask your healthcare professional. [place] [address] any warranty or liability for your use of this information. Patient Education        Trochanteric Bursitis: Exercises  Introduction  Here are some examples of exercises for you to try. The exercises may be suggested for a condition or for rehabilitation. Start each exercise slowly. Ease off the exercises if you start to have pain. You will be told when to start these exercises and which ones will work best for you. How to do the exercises  Hamstring wall stretch    1. Lie on your back in a doorway, with your good leg through the open door. 2. Slide your affected leg up the wall to straighten your knee. You should feel a gentle stretch down the back of your leg. 3. Hold the stretch for at least 1 minute to begin. Then try to lengthen the time you hold the stretch to as long as 6 minutes. 4. Repeat 2 to 4 times. 5. If you do not have a place to do this exercise in a doorway, there is another way to do it:  6. Lie on your back, and bend the knee of your affected leg. 7. Loop a towel under the ball and toes of that foot, and hold the ends of the towel in your hands. 8. Straighten your knee, and slowly pull back on the towel. You should feel a gentle stretch down the back of your leg. 9. Hold the stretch for 15 to 30 seconds.  Or even better, hold your body, and then gently pull your knee toward your opposite shoulder. 3. Hold the stretch for 15 to 30 seconds. 4. Repeat 2 to 4 times. Double knee-to-chest    1. Lie on your back with your knees bent and your feet flat on the floor. You can put a small pillow under your head and neck if it is more comfortable. 2. Bring both knees to your chest.  3. Keep your lower back pressed to the floor. Hold for 15 to 30 seconds. 4. Relax, and lower your knees to the starting position. 5. Repeat 2 to 4 times. Follow-up care is a key part of your treatment and safety. Be sure to make and go to all appointments, and call your doctor if you are having problems. It's also a good idea to know your test results and keep a list of the medicines you take. Where can you learn more? Go to https://Jans Digital PlanspeAkenerji Elektrik Uretim.Stryking Entertainment. org and sign in to your Accelergy account. Enter D627 in the [employer] box to learn more about \"Trochanteric Bursitis: Exercises. \"     If you do not have an account, please click on the \"Sign Up Now\" link. Current as of: July 1, 2021               Content Version: 13.0  © 2006-2021 Healthwise, Incorporated. Care instructions adapted under license by [facility] ([facility]). If you have questions about a medical condition or this instruction, always ask your healthcare professional. [place] [address] any warranty or liability for your use of this information. Patient Education        Iliotibial Band Syndrome: Care Instructions  Your Care Instructions  Iliotibial band syndrome is pain and swelling of the iliotibial band (also called the IT band). This is a band of tissue that runs down the outside of your thigh. It connects the side of your hip to the side of your knee. It helps keep your knee and hip stable and in their normal position. When you have IT band syndrome, you may feel pain on the outside of your hip.  It happens as your IT band snaps back and forth over the bony point of your hip. Sometimes you may only feel pain on the outside of your knee. You can get this syndrome if the IT band is too tight or if you do certain activities over and over that put pressure on your hip or knee. This is a common problem in runners, cyclists, and people who do other aerobic activities. IT band syndrome is treated with rest and medicines. These relieve swelling and pain. Physical therapy is also used. It may include stretching or doing certain exercises that can help strengthen your IT band and hip muscles. Sometimes a steroid shot is given to help relieve pain at the spot that is most sore. Follow-up care is a key part of your treatment and safety. Be sure to make and go to all appointments, and call your doctor if you are having problems. It's also a good idea to know your test results and keep a list of the medicines you take. How can you care for yourself at home? · Stay at a healthy weight. Being overweight puts extra strain on your hip and knee joints. · Take pain medicines exactly as directed. ? If the doctor gave you a prescription medicine for pain, take it as prescribed. ? If you are not taking a prescription pain medicine, ask your doctor if you can take an over-the-counter medicine. · Talk to your doctor or physical therapist about exercises that will help ease hip and knee pain. ? Stretch before you exercise. This can help prevent stiffness and injury. You can try gentle forms of yoga to help keep your joints and muscles flexible. ? Use exercises that are less stressful on the joints. Walk instead of jog. Ride a stationary bike with little resistance. Or you can swim or try water exercise. ? Do exercises that can help strengthen your IT band and hip muscles. Your doctor or physical therapist can tell you what kind of exercises are best for you. He or she can help you learn the right way to do the exercises. When should you call for help?   Watch closely for changes in your health, and be sure to contact your doctor if:    · You have pain in your hip or knee that doesn't go away.     · You do not get better as expected. Where can you learn more? Go to https://chpeemmanuel.Accurate Group. org and sign in to your qianchengwuyou account. Enter L449 in the [facility] box to learn more about \"Iliotibial Band Syndrome: Care Instructions. \"     If you do not have an account, please click on the \"Sign Up Now\" link. Current as of: July 1, 2021               Content Version: 13.0  © 5373-5327 PushSpring. Care instructions adapted under license by [facility] ([facility]). If you have questions about a medical condition or this instruction, always ask your healthcare professional. [address] any warranty or liability for your use of this information. Patient Education        Iliotibial Band Syndrome: Exercises  Introduction  Here are some examples of exercises for you to try. The exercises may be suggested for a condition or for rehabilitation. Start each exercise slowly. Ease off the exercises if you start to have pain. You will be told when to start these exercises and which ones will work best for you. How to do the exercises  Iliotibial band stretch    1. Lean sideways against a wall. If you are not steady on your feet, hold on to a chair or counter. 2. Stand on the leg with the affected hip, with that leg close to the wall. Then cross your other leg in front of it. 3. Let your affected hip drop out to the side of your body and against the wall. Then lean away from your affected hip until you feel a stretch. 4. Hold the stretch for 15 to 30 seconds. 5. Repeat 2 to 4 times. Piriformis stretch    1. Lie on your back with your legs straight. 2. Lift your affected leg and bend your knee. With your opposite hand, reach across your body, and then gently pull your knee toward your opposite shoulder.   3. Hold the stretch for 15 to 30 seconds. 4. Repeat 2 to 4 times. Hamstring wall stretch    1. Lie on your back in a doorway, with your good leg through the open door. 2. Slide your affected leg up the wall to straighten your knee. You should feel a gentle stretch down the back of your leg. 3. Hold the stretch for at least 1 minute to begin. Then try to lengthen the time you hold the stretch to as long as 6 minutes. 4. Repeat 2 to 4 times. 5. If you do not have a place to do this exercise in a doorway, there is another way to do it:  6. Lie on your back, and bend the knee of your affected leg. 7. Loop a towel under the ball and toes of that foot, and hold the ends of the towel in your hands. 8. Straighten your knee, and slowly pull back on the towel. You should feel a gentle stretch down the back of your leg. 9. Hold the stretch for 15 to 30 seconds. Or even better, hold the stretch for 1 minute if you can. 10. Repeat 2 to 4 times. 1. Do not arch your back. 2. Do not bend either knee. 3. Keep one heel touching the floor and the other heel touching the wall. Do not point your toes. Follow-up care is a key part of your treatment and safety. Be sure to make and go to all appointments, and call your doctor if you are having problems. It's also a good idea to know your test results and keep a list of the medicines you take. Where can you learn more? Go to https://NanoVibronixpeTUNJI.Global Education Learning. org and sign in to your HomeCon account. Enter P252 in the [facility] box to learn more about \"Iliotibial Band Syndrome: Exercises. \"     If you do not have an account, please click on the \"Sign Up Now\" link. Current as of: July 1, 2021               Content Version: 13.0  © 2006-2021 Healthwise, Incorporated. Care instructions adapted under license by [facility] ([facility]).  If you have questions about a medical condition or this instruction, always ask your healthcare professional. Shyam Jones disclaims any warranty or liability for your use of this information.

## 2022-02-03 DIAGNOSIS — M50.30 DDD (DEGENERATIVE DISC DISEASE), CERVICAL: ICD-10-CM

## 2022-02-03 DIAGNOSIS — M79.7 FIBROMYALGIA: ICD-10-CM

## 2022-02-03 RX ORDER — TIZANIDINE 4 MG/1
TABLET ORAL
Qty: 90 TABLET | Refills: 1 | Status: SHIPPED | OUTPATIENT
Start: 2022-02-03 | End: 2022-07-28

## 2022-02-19 ENCOUNTER — HOSPITAL ENCOUNTER (OUTPATIENT)
Dept: WOMENS IMAGING | Age: 62
Discharge: HOME | End: 2022-02-19
Payer: COMMERCIAL

## 2022-02-19 VITALS — HEIGHT: 62 IN | WEIGHT: 218 LBS | BODY MASS INDEX: 40.12 KG/M2

## 2022-02-19 DIAGNOSIS — Z12.31 ENCOUNTER FOR SCREENING MAMMOGRAM FOR BREAST CANCER: ICD-10-CM

## 2022-02-19 PROCEDURE — 77063 BREAST TOMOSYNTHESIS BI: CPT

## 2022-03-10 ENCOUNTER — OFFICE VISIT (OUTPATIENT)
Dept: INTERNAL MEDICINE CLINIC | Age: 62
End: 2022-03-10
Payer: COMMERCIAL

## 2022-03-10 VITALS
SYSTOLIC BLOOD PRESSURE: 118 MMHG | OXYGEN SATURATION: 97 % | RESPIRATION RATE: 12 BRPM | DIASTOLIC BLOOD PRESSURE: 76 MMHG | WEIGHT: 220.2 LBS | BODY MASS INDEX: 40.52 KG/M2 | HEIGHT: 62 IN | HEART RATE: 77 BPM

## 2022-03-10 DIAGNOSIS — E53.8 B12 DEFICIENCY: ICD-10-CM

## 2022-03-10 DIAGNOSIS — I87.2 VENOUS INSUFFICIENCY OF BOTH LOWER EXTREMITIES: ICD-10-CM

## 2022-03-10 DIAGNOSIS — I10 BENIGN ESSENTIAL HTN: ICD-10-CM

## 2022-03-10 DIAGNOSIS — E53.8 FOLATE DEFICIENCY: ICD-10-CM

## 2022-03-10 DIAGNOSIS — Z00.00 ANNUAL PHYSICAL EXAM: Primary | ICD-10-CM

## 2022-03-10 DIAGNOSIS — Z13.1 SCREENING FOR DIABETES MELLITUS: ICD-10-CM

## 2022-03-10 DIAGNOSIS — R73.01 IMPAIRED FASTING GLUCOSE: ICD-10-CM

## 2022-03-10 DIAGNOSIS — Z82.49 FAMILY HISTORY OF EARLY CAD: ICD-10-CM

## 2022-03-10 DIAGNOSIS — M79.7 FIBROMYALGIA: ICD-10-CM

## 2022-03-10 DIAGNOSIS — E55.9 VITAMIN D DEFICIENCY: ICD-10-CM

## 2022-03-10 DIAGNOSIS — M79.651 RIGHT THIGH PAIN: ICD-10-CM

## 2022-03-10 PROCEDURE — 99396 PREV VISIT EST AGE 40-64: CPT | Performed by: INTERNAL MEDICINE

## 2022-03-10 PROCEDURE — 93000 ELECTROCARDIOGRAM COMPLETE: CPT | Performed by: INTERNAL MEDICINE

## 2022-03-10 ASSESSMENT — PATIENT HEALTH QUESTIONNAIRE - PHQ9
SUM OF ALL RESPONSES TO PHQ QUESTIONS 1-9: 0
2. FEELING DOWN, DEPRESSED OR HOPELESS: 0
SUM OF ALL RESPONSES TO PHQ9 QUESTIONS 1 & 2: 0
SUM OF ALL RESPONSES TO PHQ QUESTIONS 1-9: 0
1. LITTLE INTEREST OR PLEASURE IN DOING THINGS: 0
SUM OF ALL RESPONSES TO PHQ QUESTIONS 1-9: 0
SUM OF ALL RESPONSES TO PHQ QUESTIONS 1-9: 0

## 2022-03-10 NOTE — PATIENT INSTRUCTIONS
Preventive plan of care for Lj River        3/10/2022           Preventive Measures Status       Recommendations for screening   Colon Cancer Screen   Last colonoscopy: 01/18/2016, Dr. Noemi Thomas Colonoscopy due every 10 years--2026    Breast Cancer Screen  Last mammogram: 2/19/2022 Repeat yearly. Cervical Cancer Screen   Last PAP smear: 3/11/2021 Repeat every 3 years or as advised by gynecologist.  Vijaya Novak still require a pelvic, rectal, and breast exam yearly. Osteoporosis Screen   Last DXA scan: None Test is due at age 72. Diabetes Screen  Glucose (mg/dL)   Date Value   08/18/2021 84    Test recommended and ordered. Cholesterol Screen   Lab Results   Component Value Date    CHOL 185 08/18/2021    TRIG 70 08/18/2021    HDL 51 08/18/2021    LDLCALC 120 (H) 08/18/2021    Test recommended and ordered   HIV screening recommended for those ages 12-76 NO MATTER THE RISK FOR HIV--  9/9/2016  No need to repeat at this time    Hepatitis C screening recommended for those between the ages of 25 and 79--9/9/2016 No need to repeat at this time    Aspirin for Cardiovascular Prevention   No Aspirin not indicated at this time due to your low 10-year atherosclerotic cardiovascular disease risk score. Weight: Body mass index is 40.28 kg/m². 5' 2\" (1.575 m)220 lb 3.2 oz (99.9 kg)    Your BMI is 25 or greater, which indicates that you are overweight with increased risk of cardiovascular disease. Continue aggressive lifestyle modifications including a low-fat, portion controlled diet along with regular aerobic exercise.   Consider bariatric consultation    Recommended Immunizations    Immunization History   Administered Date(s) Administered    COVID-19, Pfizer Purple top, DILUTE for use, 12+ yrs, 30mcg/0.3mL dose 02/23/2021, 03/16/2021, 10/07/2021    Influenza Virus Vaccine 02/25/2016, 09/22/2019, 10/17/2020    Influenza, MDCK Quadv, IM, PF (Flucelvax 2 yrs and older) 12/04/2021    Influenza, Quadv, IM, PF (6 mo and older Fluzone, Flulaval, Fluarix, and 3 yrs and older Afluria) 09/06/2016, 11/28/2017, 11/29/2018    Pneumococcal Polysaccharide ([id number]) 02/25/2016    Tdap (Boostrix, Adacel) 08/11/2009, 11/27/2019    Zoster Recombinant (Shingrix) 08/18/2021, 12/04/2021        Influenza vaccine:  recommended every fall    Pneumonia vaccine: Pneumovax due at age 72    Tetanus vaccine:  tetanus and diptheria/Pertussis vaccine (Td/Tdap) recommended every 10 years- Tdap is due 2029    Shingles vaccine:  Shingrx is complete         Additional Recommendations   1. Use sunscreen daily to help reduce the risk of skin cancer  2. Continue working on lifestyle modification including a calorie restricted and portion control diet focusing on low-fat options as well as low carb options. 3. Update your eye exam every 2 years to screen for glaucoma, cataracts, and macular degeneration  4. Always wear a seatbelt while riding in a car  5. Consider obtaining a CT cardiac calcium score to evaluate for coronary artery disease plaque. Insurance will not cover this. Based on results, further cardiac testing may be needed        Here are a few  Reliable websites with a variety of health and wellness information:   www.mylifecheck. heart. org     www.nutritionsource. org     www. americanheart. org     www. diabetes. org      www.menopause. org     www.[facility]     www.[employer] ([address] [facility] site)        Patient Education        Fibromyalgia: Care Instructions  Overview     Fibromyalgia is a painful condition that is not completely understood by medical experts. The cause of fibromyalgia is not known. It can make you feel tired and ache all over. It causes tender spots at specific points of the body that hurt only when you press on them. You may have trouble sleeping, as well as other symptoms. These problems can upset your work and home life. Symptoms tend to come and go, although they may never go away completely.  Fibromyalgia does not harm your muscles, joints, or organs. Follow-up care is a key part of your treatment and safety. Be sure to make and go to all appointments, and call your doctor if you are having problems. It's also a good idea to know your test results and keep a list of the medicines you take. How can you care for yourself at home? · Exercise often. Walk, swim, or bike to help with pain and sleep problems and to make you feel better. · Try to get a good night's sleep. Go to bed and get up at the same time each day, whether you feel rested or not. Make sure you have a good mattress and pillow. · Reduce stress. Avoid things that cause you stress, if you can. If not, work at making them less stressful. Learn to use biofeedback, guided imagery, meditation, or other methods to relax. · Make healthy changes. Eat a balanced diet, quit smoking, and limit alcohol and caffeine. · Use a heating pad set on low or take warm baths or showers for pain. Using cold packs for up to 20 minutes at a time can also relieve pain. Put a thin cloth between the cold pack and your skin. A gentle massage might help too. · Be safe with medicines. Take your medicines exactly as prescribed. Call your doctor if you think you are having a problem with your medicine. Your doctor may talk to you about taking antidepressant medicines. These medicines may improve sleep, relieve pain, and in some cases treat depression. · Learn about fibromyalgia. This makes coping easier. Then, take an active role in your treatment. · Think about joining a support group with others who have fibromyalgia to learn more and get support. When should you call for help? Watch closely for changes in your health, and be sure to contact your doctor if:    · You feel sad, helpless, or hopeless; lose interest in things you used to enjoy; or have other symptoms of depression.     · Your fibromyalgia symptoms get worse. Where can you learn more?   Go to [email or web address] and sign in to your [employer] account. Enter V003 in the [facility] box to learn more about \"Fibromyalgia: Care Instructions. \"     If you do not have an account, please click on the \"Sign Up Now\" link. Current as of: April 8, 2021               Content Version: 13.1  © 8002-5397 [employer]. Care instructions adapted under license by [facility] ([facility]). If you have questions about a medical condition or this instruction, always ask your healthcare professional. [address] any warranty or liability for your use of this information. Patient Education        Low Sodium Diet (2,000 Milligram): Care Instructions  Overview     Limiting sodium can be an important part of managing some health problems. The most common source of sodium is salt. People get most of the salt in their diet from canned, prepared, and packaged foods. Fast food and restaurant meals also are very high in sodium. Your doctor will probably limit your sodium to less than 2,000 milligrams (mg) a day. This limit counts all the sodium in prepared and packaged foods and any salt you add to your food. Follow-up care is a key part of your treatment and safety. Be sure to make and go to all appointments, and call your doctor if you are having problems. It's also a good idea to know your test results and keep a list of the medicines you take. How can you care for yourself at home? Read food labels  · Read labels on cans and food packages. The labels tell you how much sodium is in each serving. Make sure that you look at the serving size. If you eat more than the serving size, you have eaten more sodium. · Food labels also tell you the Percent Daily Value for sodium. Choose products with low Percent Daily Values for sodium.   · Be aware that sodium can come in forms other than salt, including monosodium glutamate (MSG), sodium citrate, and sodium bicarbonate (baking soda). MSG is often added to Asian food. When you eat out, you can sometimes ask for food without MSG or added salt. Buy low-sodium foods  · Buy foods that are labeled \"unsalted\" (no salt added), \"sodium-free\" (less than 5 mg of sodium per serving), or \"low-sodium\" (140 mg or less of sodium per serving). Foods labeled \"reduced-sodium\" and \"light sodium\" may still have too much sodium. Be sure to read the label to see how much sodium you are getting. · Buy fresh vegetables, or frozen vegetables without added sauces. Buy low-sodium versions of canned vegetables, soups, and other canned goods. Prepare low-sodium meals  · Cut back on the amount of salt you use in cooking. This will help you adjust to the taste. Do not add salt after cooking. One teaspoon of salt has about 2,300 mg of sodium. · Take the salt shaker off the table. · Flavor your food with garlic, lemon juice, onion, vinegar, herbs, and spices. Do not use soy sauce, lite soy sauce, steak sauce, onion salt, garlic salt, celery salt, or ketchup on your food. · Use low-sodium salad dressings, sauces, and ketchup. Or make your own salad dressings and sauces without adding salt. · Use less salt (or none) when recipes call for it. You can often use half the salt a recipe calls for without losing flavor. Other foods such as rice, pasta, and grains do not need added salt. · Rinse canned vegetables, and cook them in fresh water. This removes somebut not allof the salt. · Avoid water that is naturally high in sodium or that has been treated with water softeners, which add sodium. If you buy bottled water, read the label and choose a sodium-free brand. Avoid high-sodium foods  · Avoid eating:  ? Smoked, cured, salted, and canned meat, fish, and poultry. ? Ham, mckeon, hot dogs, and luncheon meats. ? Regular, hard, and processed cheese and regular peanut butter.   ? Crackers with salted tops, and other salted snack foods such as pretzels, chips, and salted popcorn. ? Frozen prepared meals, unless labeled low-sodium. ? Canned and dried soups, broths, and bouillon, unless labeled sodium-free or low-sodium. ? Canned vegetables, unless labeled sodium-free or low-sodium. ? Western Nadia fries, pizza, tacos, and other fast foods. ? Pickles, olives, ketchup, and other condiments, especially soy sauce, unless labeled sodium-free or low-sodium. Where can you learn more? Go to https://DuXplorepeAcertiv.[employer] bi[employer]. org and sign in to your [employer] account. Enter C946 in the [employer] box to learn more about \"Low Sodium Diet (2,000 Milligram): Care Instructions. \"     If you do not have an account, please click on the \"Sign Up Now\" link. Current as of: September 8, 2021               Content Version: 13.1  © 2006-2021 [employer]. Care instructions adapted under license by [facility] ([facility]). If you have questions about a medical condition or this instruction, always ask your healthcare professional. [place] [address] any warranty or liability for your use of this information. Patient Education        Prediabetes: Care Instructions  Overview     Prediabetes is a warning sign that you're at risk for getting type 2 diabetes. It means that your blood sugar is higher than it should be. But it's not high enough to be diabetes. The food you eat naturally turns into sugar. Your body uses the sugar for energy. Normally, an organ called the pancreas makes insulin. And insulin allows the sugar in your blood to get into your body's cells. But sometimes the body can't use insulin the right way. So the sugar stays in your blood instead. This is called insulin resistance. The buildup of sugar in your blood means you have prediabetes. The good news is that you may be able to prevent or delay diabetes.  Making small lifestyle changes, like getting active and changing your eating habits, may help you get your blood sugar back to normal. You can work with your doctor to make a treatment plan. Follow-up care is a key part of your treatment and safety. Be sure to make and go to all appointments, and call your doctor if you are having problems. It's also a good idea to know your test results and keep a list of the medicines you take. How can you care for yourself at home? · Watch your weight. A healthy weight helps your body use insulin properly. · Limit the amount of calories, sweets, and unhealthy fat you eat. Ask your doctor if you should see a dietitian. A registered dietitian can help you create meal plans that fit your lifestyle. · Get at least 30 minutes of exercise on most days of the week. Exercise helps control your blood sugar. It also helps you maintain a healthy weight. Walking is a good choice. You also may want to do other activities, such as running, swimming, cycling, or playing tennis or team sports. · Do not smoke. Smoking can make prediabetes worse. If you need help quitting, talk to your doctor about stop-smoking programs and medicines. These can increase your chances of quitting for good. · If your doctor prescribed medicines, take them exactly as prescribed. Call your doctor if you think you are having a problem with your medicine. You will get more details on the specific medicines your doctor prescribes. When should you call for help? Watch closely for changes in your health, and be sure to contact your doctor if:    · You have any symptoms of diabetes. These may include:  ? Being thirsty more often. ? Urinating more. ? Being hungrier. ? Losing weight. ? Being very tired. ? Having blurry vision.     · You have a wound that will not heal.     · You have an infection that will not go away.     · You have problems with your blood pressure.     · You want more information about diabetes and how you can keep from getting it. Where can you learn more? Go to [email or web address] [email or web address] and sign in to your [employer] account. Enter I222 in the [facility] box to learn more about \"Prediabetes: Care Instructions. \"     If you do not have an account, please click on the \"Sign Up Now\" link. Current as of: July 28, 2021               Content Version: 13.1  © 2006-2021 [employer]. Care instructions adapted under license by [facility] [address] If you have questions about a medical condition or this instruction, always ask your healthcare professional. [place] [address] any warranty or liability for your use of this information. Patient Education        Well Visit, Women 48 to 72: Care Instructions  Overview     Well visits can help you stay healthy. Your doctor has checked your overall health and may have suggested ways to take good care of yourself. Your doctor also may have recommended tests. At home, you can help prevent illness with healthy eating, regular exercise, and other steps. Follow-up care is a key part of your treatment and safety. Be sure to make and go to all appointments, and call your doctor if you are having problems. It's also a good idea to know your test results and keep a list of the medicines you take. How can you care for yourself at home? · Get screening tests that you and your doctor decide on. Screening helps find diseases before any symptoms appear. · Eat healthy foods. Choose fruits, vegetables, whole grains, protein, and low-fat dairy foods. Limit fat, especially saturated fat. Reduce salt in your diet. · Limit alcohol. Have no more than 1 drink a day or 7 drinks a week. · Get at least 30 minutes of exercise on most days of the week. Walking is a good choice. You also may want to do other activities, such as running, swimming, cycling, or playing tennis or team sports. · Reach and stay at a healthy weight. This will lower your risk for many problems, such as obesity, diabetes, heart disease, and high blood pressure. · Do not smoke.  Smoking can make health problems worse. If you need help quitting, talk to your doctor about stop-smoking programs and medicines. These can increase your chances of quitting for good. · Care for your mental health. It is easy to get weighed down by worry and stress. Learn strategies to manage stress, like deep breathing and mindfulness, and stay connected with your family and community. If you find you often feel sad or hopeless, talk with your doctor. Treatment can help. · Talk to your doctor about whether you have any risk factors for sexually transmitted infections (STIs). You can help prevent STIs if you wait to have sex with a new partner (or partners) until you've each been tested for STIs. It also helps if you use condoms (male or female condoms) and if you limit your sex partners to one person who only has sex with you. Vaccines are available for some STIs. · If you think you may have a problem with alcohol or drug use, talk to your doctor. This includes prescription medicines (such as amphetamines and opioids) and illegal drugs (such as cocaine and methamphetamine). Your doctor can help you figure out what type of treatment is best for you. · Protect your skin from too much sun. When you're outdoors from 10 a.m. to 4 p.m., stay in the shade or cover up with clothing and a hat with a wide brim. Wear sunglasses that block UV rays. Even when it's cloudy, put broad-spectrum sunscreen (SPF 30 or higher) on any exposed skin. · See a dentist one or two times a year for checkups and to have your teeth cleaned. · Wear a seat belt in the car. When should you call for help? Watch closely for changes in your health, and be sure to contact your doctor if you have any problems or symptoms that concern you. Where can you learn more? Go to https://levi.health-partners. org and sign in to your [employer] account.  Enter A906 in the [employer] box to learn more about \"Well Visit, Women 50 to 72: Care Instructions. \"     If you do not have an account, please click on the \"Sign Up Now\" link. Current as of: October 6, 2021               Content Version: 13.1  © 0355-8579 Healthwise[employer]. Care instructions adapted under license by [facility] ([facility]). If you have questions about a medical condition or this instruction, always ask your healthcare professional. [address] any warranty or liability for your use of this information.

## 2022-03-10 NOTE — PROGRESS NOTES
[facility]) Physicians  Internal Medicine  Patient Encounter  Krysta Arenas D.O., [facility] Preventative Physical    Chief Complaint   Patient presents with    Annual Exam     dicuss all over body pain, getting worse, x3 months        HPI-- 64 y.o. female presents today requesting a complete annual preventative history and physical.       Medical/Surgical Histories     Past Medical History:   Diagnosis Date    Allergic rhinitis     Anxiety     Asthma     Depression     Gastritis     GERD (gastroesophageal reflux disease)     Hiatal hernia     Impaired fasting glucose 2/12/2021    Iron defic anemia     OAB (overactive bladder) 1/30/2019    Obesity     GAYATHRI (obstructive sleep apnea) 2009    Unspecified sleep apnea     cpap    Venous insufficiency of both lower extremities 9/6/2016    Vitamin B12 deficiency     Vitamin D deficiency           Past Surgical History:   Procedure Laterality Date    BREAST REDUCTION SURGERY      BREAST SURGERY  1996    reduction & tumor    CARPAL TUNNEL RELEASE      HYSTEROSCOPY  8/2009    and fibroid removal    LIPOSUCTION      chin    UPPER GASTROINTESTINAL ENDOSCOPY  2005    UPPER GASTROINTESTINAL ENDOSCOPY  7/22/11           Medications/Allergies     Medication Sig   tiZANidine (ZANAFLEX) 4 MG tablet TAKE 1 TABLET BY MOUTH EVERY NIGHT   DULoxetine (CYMBALTA) 60 MG extended release capsule TAKE 1 CAPSULE BY MOUTH EVERY DAY   solifenacin (VESICARE) 10 MG tablet Take 10 mg by mouth daily   valsartan (DIOVAN) 80 MG tablet Take 1 tablet by mouth daily   meloxicam (MOBIC) 15 MG tablet Take 15 mg by mouth daily   potassium chloride (KLOR-CON M) 20 MEQ extended release tablet TAKE 1 TABLET BY MOUTH DAILY if taking Lasix   furosemide (LASIX) 40 MG tablet Take 1 tablet by mouth daily as needed (swelling)   hydrocortisone 2.5 % cream Apply to the eyebrows daily if needed for scaling. Use no more than 2 weeks at a time.    Azelastine-Fluticasone (DYMISTA NA) by compression stockings. Legs feel better with stockings on.  + Heart murmur. Echo 1/7/2020  GASTROINTESTINAL:  Neg   Nausea, vomiting, or diarrhea, constipation, hematemesis, heart burn, dysphagia,change in bowel movements or stool caliber, hematochezia, melena, abdominal pain, or food intolerance. Colonoscopy: Yes,  + She is on a gluten free diet which helps with diarrhea. She did not test positive for celiac. GENITOURINARY:   Neg  Urinary frequency, hesitancy, polyuria, dysuria, hematuria, nocturia, incontinence, change in stream, genital pain or swelling, kidney stones, STD's. PAP/LUNA: Yes. History of overactive bladder, Gyn changed her to [address] Myrbetriq increased her BP  HEMATOLOGIC/LYMPHATIC:  Neg bleeding dyscrasias, blood clots (DVT/PE), transfusions, or enlarged lymph nodes H/O easy bruising and anemia  MUSCULOSKELETAL:    + neck pain. Seeing Dr. Eugenia Del Real at [facility]. Patient has fibromyalgia and does have diffuse body aches and fatigue. She remains on Cymbalta-- No changes. Also sees Dr. Gt Tafoya at [facility]. Had right troch hip injection. She does have some right low back pain, but no radiation. Pain in the right thigh. NEUROLOGICAL:  Neg  Loss of Consciousness, memeory loss or forgetfulness, confusion, difficulty concentrating, seizures, insomina, aphasia or dysarthria unilateral weakness or paresthesias, ataxia, headaches, syncope, tremor, or H/O head trauma. PSYCHIATRIC:  Pos  H/O depression and anxiety, mood has been better, seeing a therapist.   Neg  personality changes, nervousness, drug or alcohol use/abuse, H/O psych counseling: YES, Psychotropics: Yes, Cymbalta  SKIN :  Neg  Rash, nail changes, sun burns, tattoos, change in moles, or skin color changes  ENDOCRINE:  Neg  Polydipsia, polyuria, abnormal weight changes,heat /cold intolerance, Hair changes. No H/O Diabetes or Thyroid disease.      Preventive Care:    Health Maintenance   Topic Date Due    A1C test (Diabetic or Prediabetic)  08/18/2022    Potassium monitoring  08/18/2022    Creatinine monitoring  08/18/2022    Depression Screen  03/10/2023    Breast cancer screen  02/19/2024    Cervical cancer screen  03/11/2024    Pneumococcal 0-64 years Vaccine (2 of 2 - PPSV23) 11/26/2025    Colorectal Cancer Screen  01/18/2026    Lipid screen  08/18/2026    DTaP/Tdap/Td vaccine (3 - Td or Tdap) 11/27/2029    Flu vaccine  Completed    Shingles Vaccine  Completed    COVID-19 Vaccine  Completed    Hepatitis C screen  Completed    HIV screen  Completed    Hepatitis A vaccine  Aged Out    Hepatitis B vaccine  Aged Out    Hib vaccine  Aged Out    Meningococcal (ACWY) vaccine  Aged Out      Hx abnormal PAP: no  Sexual activity: single partner, contraception-- vasectomy,   Self-breast exams: sometimes  Last eye exam: 09/2019, normal, Glasses  Exercise: No regular exercise. Seatbelt use: Yes  Sun Screen: Yes  Dentist: Lu Palma, every 6 months      Physical Exam    Vitals:    03/10/22 0750   BP: 118/76   Pulse: 77   Resp: 12   SpO2: 97%   Weight: 220 lb 3.2 oz (99.9 kg)   Height: 5' 2\" (1.575 m)     Body mass index is 40.28 kg/m². Wt Readings from Last 3 Encounters:   03/10/22 220 lb 3.2 oz (99.9 kg)   02/19/22 218 lb (98.9 kg)   12/04/21 210 lb (95.3 kg)     BP Readings from Last 3 Encounters:   03/10/22 118/76   12/04/21 126/80   08/18/21 112/68         GEN:  64 y.o. female who is in NAD, A&O. She appears stated age and well nourished. She is cooperative and pleasant. Obese  HEAD:  NC/AT, no lesions. EYES:  GARCIA, EOMI, No scleral icterus or conjunctival injection or discharge. Visual fields in tact to confrontation. EARS:  EAC's clear, TM's normal.  NOSE:  Nasal mucosa pale and engorged. MOUTH & THROAT:  Oral cavity is clear without mucosal lesions. Tongue is midline. Dentition is in good repair. No pharyngeal erythema or exudate. NECK:  Supple. Full ROM. Trachea is midline. No increased JVD. No thyromegaly or nodules. No masses  LYMPH: No C/SC/A/F nodes  CARDIAC:  S1S2 NL. Regular rhythm. No murmur/clicks/rubs. PMI is non-displaced. Soft systolic murmur. VASC:  Pedal pulses 2/4. Carotid upstrokes 2+. No bruits noted. PULM:  Lungs are CTA. Symmetric breath sounds noted. No wheezing or rales  GI:  Abdomen is soft and nontender. No distension. No organomegaly. No masses. No pulsatile masses. EXT:  No Cyanosis or clubbing. 1+ LE pitting edema. Compression stockings on. SKIN: Warm and dry, normal turgor, no rash or lesions of concern. NEURO:  Cranial nerves 2-12 are NL. Speech fluent and coherent. Strength is 5/5 in all muscle groups. Reflexes 2/4 and symmetric. MS:   No joint effusions. Full joint ROM. Multiple symmetric tender points C/W Fibromyalgia. Focal tenderness over the right greater trochanteric region. Interestingly patient only has tenderness over the left medial epicondyle but none over the right medial epicondyle  PSYCH:  Mood and affect NL. Judgement and insight NL.        ASSESSMENT/PLAN:    1. Annual physical exam  Chart reviewed for care gaps. None identified  - EKG 12 Lead  - CBC with Auto Differential; Future  - Comprehensive Metabolic Panel; Future  - Lipid Panel; Future  - Hemoglobin A1C; Future  - TSH; Future  - Vitamin B12 & Folate; Future  - Vitamin D 25 Hydroxy; Future    2. Impaired fasting glucose    - Comprehensive Metabolic Panel; Future  - Hemoglobin A1C; Future    3. Screening for diabetes mellitus    - Comprehensive Metabolic Panel; Future  - Hemoglobin A1C; Future    4. B12 deficiency    - Vitamin B12 & Folate; Future    5. Vitamin D deficiency    - Vitamin D 25 Hydroxy; Future    6. Folate deficiency    - Vitamin B12 & Folate; Future    7. Benign essential HTN  Blood pressure is well controlled  Continue current medication  - EKG 12 Lead  - CT CARDIAC CALCIUM SCORING;  Future  - CBC with Auto Differential; Future  - Comprehensive Metabolic Panel; Future  - Lipid Panel; Future  - TSH; Future    8. Family history of early CAD  Patient interested in diagnostic testing. We discussed a CT cardiac calcium score  - EKG 12 Lead  - CT CARDIAC CALCIUM SCORING; Future    9. Fibromyalgia  Condition may be uncontrolled  Continue Cymbalta. Consider increasing to 90 mg  Continue tizanidine  Consider adding Topamax or other neuromodulator such as Lyrica. The latter likely not a good choice due to edema    10. Venous insufficiency of both lower extremities  Improved with compression stockings    11. Right thigh pain  Etiology is unclear  Patient does have trochanteric pain. I would not expect the anterior thigh to be affected with trochanteric bursitis. ?  Lumbar radiculopathy  Recommend she return to Dr. Jerry Rodriguez at [address] care for Austyn Diaz        3/10/2022           Preventive Measures Status       Recommendations for screening   Colon Cancer Screen   Last colonoscopy: 01/18/2016, Dr. Gerard Cates Colonoscopy due every 10 years--2026    Breast Cancer Screen  Last mammogram: 2/19/2022 Repeat yearly. Cervical Cancer Screen   Last PAP smear: 3/11/2021 Repeat every 3 years or as advised by gynecologist.  Sarah Mcneill still require a pelvic, rectal, and breast exam yearly. Osteoporosis Screen   Last DXA scan: None Test is due at age 72. Diabetes Screen  Glucose (mg/dL)   Date Value   08/18/2021 84    Test recommended and ordered.      Cholesterol Screen   Lab Results   Component Value Date    CHOL 185 08/18/2021    TRIG 70 08/18/2021    HDL 51 08/18/2021    LDLCALC 120 (H) 08/18/2021    Test recommended and ordered   HIV screening recommended for those ages 12-76 NO MATTER THE RISK FOR HIV--  9/9/2016  No need to repeat at this time    Hepatitis C screening recommended for those between the ages of 25 and 79--9/9/2016 No need to repeat at this time    Aspirin for Cardiovascular Prevention   No Aspirin not indicated at this time due to your low 10-year atherosclerotic cardiovascular disease risk score. Weight: Body mass index is 40.28 kg/m². 5' 2\" (1.575 m)220 lb 3.2 oz (99.9 kg)    Your BMI is 25 or greater, which indicates that you are overweight with increased risk of cardiovascular disease. Continue aggressive lifestyle modifications including a low-fat, portion controlled diet along with regular aerobic exercise. Consider bariatric consultation    Recommended Immunizations    Immunization History   Administered Date(s) Administered    COVID-19, Pfizer Purple top, DILUTE for use, 12+ yrs, 30mcg/0.3mL dose 02/23/2021, 03/16/2021, 10/07/2021    Influenza Virus Vaccine 02/25/2016, 09/22/2019, 10/17/2020    Influenza, MDCK Quadv, IM, PF (Flucelvax 2 yrs and older) 12/04/2021    Influenza, Daiva Palomino, IM, PF (6 mo and older Fluzone, Flulaval, Fluarix, and 3 yrs and older Afluria) 09/06/2016, 11/28/2017, 11/29/2018    Pneumococcal Polysaccharide (Leiqvsnnl27) 02/25/2016    Tdap (Boostrix, Adacel) 08/11/2009, 11/27/2019    Zoster Recombinant (Shingrix) 08/18/2021, 12/04/2021        Influenza vaccine:  recommended every fall    Pneumonia vaccine: Pneumovax due at age 72    Tetanus vaccine:  tetanus and diptheria/Pertussis vaccine (Td/Tdap) recommended every 10 years- Tdap is due 2029    Shingles vaccine:  Shingrx is complete         Additional Recommendations   1. Use sunscreen daily to help reduce the risk of skin cancer  2. Continue working on lifestyle modification including a calorie restricted and portion control diet focusing on low-fat options as well as low carb options. 3. Update your eye exam every 2 years to screen for glaucoma, cataracts, and macular degeneration  4. Always wear a seatbelt while riding in a car  5. Consider obtaining a CT cardiac calcium score to evaluate for coronary artery disease plaque. Insurance will not cover this.   Based on results, further cardiac testing may be needed        Here are a few  Reliable websites with a variety of health and wellness information:   www.mylifecheck. heart. org     www.nutritionsource. org     www. americanheart. org     www. diabetes. org      www.menopause. org     www.[facility]     www.[facility])        The 10-year ASCVD risk score (Srinivasa Negrete, et al., 2013) is: 5.6%    Values used to calculate the score:      Age: 64 years      Sex: Female      Is Non- : Yes      Diabetic: No      Tobacco smoker: No      Systolic Blood Pressure: 925 mmHg      Is BP treated: Yes      HDL Cholesterol: 51 mg/dL      Total Cholesterol: 185 mg/dL

## 2022-05-24 RX ORDER — DULOXETIN HYDROCHLORIDE 60 MG/1
CAPSULE, DELAYED RELEASE ORAL
Qty: 90 CAPSULE | Refills: 1 | Status: SHIPPED | OUTPATIENT
Start: 2022-05-24

## 2022-06-02 NOTE — PATIENT INSTRUCTIONS
Patient Education        Cervical Disc Disease: Care Instructions  Your Care Instructions    Cervical disc disease results from damage, disease, or wear and tear to the discs between the bones (vertebra) in your neck. The discs act as shock absorbers for the spine and keep the spine flexible. When a disc is damaged, it can bulge out and press against the nerve roots or spinal cord. This is sometimes called a herniated or \"slipped disc. \" This pressure can cause pain and numbness or tingling in your arms and hands. It can also cause weakness in your legs. An accident can damage a disc and cause it to break open (rupture). Aging and hard physical work can also cause damage to cervical discs. The first treatments for cervical disc disease include physical therapy, special neck exercises, heat, and pain medicine. If these fail, your doctor may inject steroids and pain medicine into your neck. Surgery is usually done only if other treatments have not worked. Follow-up care is a key part of your treatment and safety. Be sure to make and go to all appointments, and call your doctor if you are having problems. It's also a good idea to know your test results and keep a list of the medicines you take. How can you care for yourself at home? · Take pain medicines exactly as directed. ? If the doctor gave you a prescription medicine for pain, take it as prescribed. ? If you are not taking a prescription pain medicine, ask your doctor if you can take an over-the-counter medicine. · Don't spend too long in one position. Take short breaks to move around and change positions. · Wear a seat belt and shoulder harness when you are in a car. · Sleep with a pillow under your head and neck that keeps your neck straight. · Follow your doctor's instructions for gentle neck-stretching exercises. · Do not smoke. Smoking can slow healing of your discs.  If you need help quitting, talk to your doctor about stop-smoking programs and medicines. These can increase your chances of quitting for good. · Avoid strenuous work or exercise until your doctor says it is okay. When should you call for help? Call 911 anytime you think you may need emergency care. For example, call if:    · You are unable to move an arm or a leg at all.   [facility] your doctor now or seek immediate medical care if:    · You have new or worse symptoms in your arms, legs, belly, or buttocks. Symptoms may include:  ? Numbness or tingling. ? Weakness. ? Pain.     · You lose bladder or bowel control.    Watch closely for changes in your health, and be sure to contact your doctor if:    · You do not get better as expected. Where can you learn more? Go to https://Layer [employer] Communications.TC Website Promotions. org and sign in to your Boxfish account. Enter N118 in the Outsell box to learn more about \"Cervical Disc Disease: Care Instructions. \"     If you do not have an account, please click on the \"Sign Up Now\" link. Current as of: September 20, 2018  Content Version: 12.0  © 9720-0656 [employer]artsy. Care instructions adapted under license by [facility] ([facility]). If you have questions about a medical condition or this instruction, always ask your healthcare professional. [place] [address] any warranty or liability for your use of this information. Patient Education        Neck Strain or Sprain: Rehab Exercises  Your Care Instructions  Here are some examples of typical rehabilitation exercises for your condition. Start each exercise slowly. Ease off the exercise if you start to have pain. Your doctor or physical therapist will tell you when you can start these exercises and which ones will work best for you. How to do the exercises  Neck rotation    1. Sit in a firm chair, or stand up straight. 2. Keeping your chin level, turn your head to the right, and hold for 15 to 30 seconds.   3. Turn your head to the left and hold for 15 to 30 seconds. 4. Repeat 2 to 4 times to each side. Neck stretches    1. Look straight ahead, and tip your right ear to your right shoulder. Do not let your left shoulder rise up as you tip your head to the right. 2. Hold for 15 to 30 seconds. 3. Tilt your head to the left. Do not let your right shoulder rise up as you tip your head to the left. 4. Hold for 15 to 30 seconds. 5. Repeat 2 to 4 times to each side. Forward neck flexion    1. Sit in a firm chair, or stand up straight. 2. Bend your head forward. 3. Hold for 15 to 30 seconds. 4. Repeat 2 to 4 times. Lateral (side) bend strengthening    1. With your right hand, place your first two fingers on your right temple. 2. Start to bend your head to the side while using gentle pressure from your fingers to keep your head from bending. 3. Hold for about 6 seconds. 4. Repeat 8 to 12 times. 5. Switch hands and repeat the same exercise on your left side. Forward bend strengthening    1. Place your first two fingers of either hand on your forehead. 2. Start to bend your head forward while using gentle pressure from your fingers to keep your head from bending. 3. Hold for about 6 seconds. 4. Repeat 8 to 12 times. Neutral position strengthening    1. Using one hand, place your fingertips on the back of your head at the top of your neck. 2. Start to bend your head backward while using gentle pressure from your fingers to keep your head from bending. 3. Hold for about 6 seconds. 4. Repeat 8 to 12 times. Chin tuck    1. Lie on the floor with a rolled-up towel under your neck. Your head should be touching the floor. 2. Slowly bring your chin toward your chest.  3. Hold for a count of 6, and then relax for up to 10 seconds. 4. Repeat 8 to 12 times. Follow-up care is a key part of your treatment and safety. Be sure to make and go to all appointments, and call your doctor if you are having problems.  It's also a good idea to know your test results and keep a list of the medicines you take. Where can you learn more? Go to https://chpepiceweb.Bycler. org and sign in to your [employer] account. Enter M679 in the [facility] box to learn more about \"Neck Strain or Sprain: Rehab Exercises. \"     If you do not have an account, please click on the \"Sign Up Now\" link. Current as of: September 20, 2018  Content Version: 12.0  © 4737-4561 Stream5. Care instructions adapted under license by [facility] ([facility]). If you have questions about a medical condition or this instruction, always ask your healthcare professional. [place] [address] any warranty or liability for your use of this information. Patient Education        Leg and Ankle Edema: Care Instructions  Your Care Instructions  Swelling in the legs, ankles, and feet is called edema. It is common after you sit or stand for a while. Long plane flights or car rides often cause swelling in the legs and feet. You may also have swelling if you have to stand for long periods of time at your job. Problems with the veins in the legs (varicose veins) and changes in hormones can also cause swelling. Sometimes the swelling in the ankles and feet is caused by a more serious problem, such as heart failure, infection, blood clots, or liver or kidney disease. Follow-up care is a key part of your treatment and safety. Be sure to make and go to all appointments, and call your doctor if you are having problems. It's also a good idea to know your test results and keep a list of the medicines you take. How can you care for yourself at home? · If your doctor gave you medicine, take it as prescribed. Call your doctor if you think you are having a problem with your medicine. · Whenever you are resting, raise your legs up. Try to keep the swollen area higher than the level of your heart. · Take breaks from standing or sitting in one position.   ? Walk around to increase the blood flow in your lower legs. ? Move your feet and ankles often while you stand, or tighten and relax your leg muscles. · Wear support stockings. Put them on in the morning, before swelling gets worse. · Eat a balanced diet. Lose weight if you need to. · Limit the amount of salt (sodium) in your diet. Salt holds fluid in the body and may increase swelling. When should you call for help? Call 911 anytime you think you may need emergency care. For example, call if:    · You have symptoms of a blood clot in your lung (called a pulmonary embolism). These may include:  ? Sudden chest pain. ? Trouble breathing. ? Coughing up blood.    Call your doctor now or seek immediate medical care if:    · You have signs of a blood clot, such as:  ? Pain in your calf, back of the knee, thigh, or groin. ? Redness and swelling in your leg or groin.     · You have symptoms of infection, such as:  ? Increased pain, swelling, warmth, or redness. ? Red streaks or pus. ? A fever.    Watch closely for changes in your health, and be sure to contact your doctor if:    · Your swelling is getting worse.     · You have new or worsening pain in your legs.     · You do not get better as expected. Where can you learn more? Go to https://Cascade [employer]drug.Medical Simulation. org and sign in to your Yogiyo account. Enter F387 in the [facility] box to learn more about \"Leg and Ankle Edema: Care Instructions. \"     If you do not have an account, please click on the \"Sign Up Now\" link. Current as of: September 23, 2018  Content Version: 12.0  © 7769-8978 Healthwise, Incorporated. Care instructions adapted under license by [facility] ([facility]). If you have questions about a medical condition or this instruction, always ask your healthcare professional. [address] any warranty or liability for your use of this information. 21.6

## 2022-07-28 DIAGNOSIS — M50.30 DDD (DEGENERATIVE DISC DISEASE), CERVICAL: ICD-10-CM

## 2022-07-28 DIAGNOSIS — M79.7 FIBROMYALGIA: ICD-10-CM

## 2022-07-28 RX ORDER — TIZANIDINE 4 MG/1
TABLET ORAL
Qty: 90 TABLET | Refills: 1 | Status: SHIPPED | OUTPATIENT
Start: 2022-07-28

## 2022-08-02 DIAGNOSIS — I10 BENIGN ESSENTIAL HTN: ICD-10-CM

## 2022-08-02 RX ORDER — VALSARTAN 80 MG/1
80 TABLET ORAL DAILY
Qty: 90 TABLET | Refills: 3 | Status: SHIPPED | OUTPATIENT
Start: 2022-08-02

## 2022-08-24 ENCOUNTER — TELEPHONE (OUTPATIENT)
Dept: INTERNAL MEDICINE CLINIC | Age: 62
End: 2022-08-24

## 2022-08-24 NOTE — TELEPHONE ENCOUNTER
Patient is calling with concerns of extreme fatigue. She states these usually occur in the morning right after eating her breakfast and/or drinking coffee. Patient states that her fatigue occurs mostly during the week, and does not notice it on the weekends when she is not working. She states she feels so fatigues while at work that she has to standup or stretch to keep herself awake. Patient states this usually lasts about 30mins to an hour. Patient also has noticed she feels cold and her eyes hurt when she feels this fatigue. Patient states she sleeps for about 6-7 hours every night and does wear a CPAP machine at night. She also has noticed she keeps going to bed earlier. Patient is scheduled 08/26 In person to discuss. Please call her with any other concerns or questions.

## 2022-08-26 ENCOUNTER — OFFICE VISIT (OUTPATIENT)
Dept: INTERNAL MEDICINE CLINIC | Age: 62
End: 2022-08-26
Payer: COMMERCIAL

## 2022-08-26 VITALS
RESPIRATION RATE: 12 BRPM | OXYGEN SATURATION: 97 % | HEART RATE: 87 BPM | DIASTOLIC BLOOD PRESSURE: 68 MMHG | WEIGHT: 211.4 LBS | BODY MASS INDEX: 38.9 KG/M2 | SYSTOLIC BLOOD PRESSURE: 110 MMHG | HEIGHT: 62 IN

## 2022-08-26 DIAGNOSIS — Z01.84 IMMUNITY STATUS TESTING: ICD-10-CM

## 2022-08-26 DIAGNOSIS — E66.01 CLASS 2 SEVERE OBESITY DUE TO EXCESS CALORIES WITH SERIOUS COMORBIDITY AND BODY MASS INDEX (BMI) OF 38.0 TO 38.9 IN ADULT (HCC): ICD-10-CM

## 2022-08-26 DIAGNOSIS — Z23 NEED FOR PNEUMOCOCCAL VACCINATION: ICD-10-CM

## 2022-08-26 DIAGNOSIS — Z29.8 NEED FOR MALARIA PROPHYLAXIS: ICD-10-CM

## 2022-08-26 DIAGNOSIS — E53.8 B12 DEFICIENCY: ICD-10-CM

## 2022-08-26 DIAGNOSIS — G47.19 EXCESSIVE DAYTIME SLEEPINESS: Primary | ICD-10-CM

## 2022-08-26 DIAGNOSIS — I87.2 VENOUS INSUFFICIENCY OF BOTH LOWER EXTREMITIES: ICD-10-CM

## 2022-08-26 DIAGNOSIS — G47.33 OSA (OBSTRUCTIVE SLEEP APNEA): ICD-10-CM

## 2022-08-26 PROBLEM — M79.7 FIBROMYALGIA: Status: ACTIVE | Noted: 2022-08-26

## 2022-08-26 PROCEDURE — 90471 IMMUNIZATION ADMIN: CPT | Performed by: INTERNAL MEDICINE

## 2022-08-26 PROCEDURE — 99214 OFFICE O/P EST MOD 30 MIN: CPT | Performed by: INTERNAL MEDICINE

## 2022-08-26 PROCEDURE — 90677 PCV20 VACCINE IM: CPT | Performed by: INTERNAL MEDICINE

## 2022-08-26 RX ORDER — SEMAGLUTIDE 1.34 MG/ML
1 INJECTION, SOLUTION SUBCUTANEOUS WEEKLY
COMMUNITY

## 2022-08-26 RX ORDER — TRAMADOL HYDROCHLORIDE 50 MG/1
50 TABLET ORAL 2 TIMES DAILY
COMMUNITY

## 2022-08-26 RX ORDER — GABAPENTIN 300 MG/1
300 CAPSULE ORAL NIGHTLY
COMMUNITY

## 2022-08-26 RX ORDER — ATOVAQUONE AND PROGUANIL HYDROCHLORIDE 250; 100 MG/1; MG/1
1 TABLET, FILM COATED ORAL DAILY
Qty: 25 TABLET | Refills: 0 | Status: SHIPPED | OUTPATIENT
Start: 2022-08-26

## 2022-08-26 RX ORDER — MINOXIDIL 2.5 MG/1
2.5 TABLET ORAL DAILY
COMMUNITY

## 2022-08-26 NOTE — PROGRESS NOTES
[facility]) Physicians  Internal Medicine  Patient Encounter  Zamzam Paredes D.O., Bus[place]        Chief Complaint   Patient presents with    Fatigue     X3 months, mid mornings becoming extremely tired, occurs sometimes after eating and drinking        HPI: 64 y.o. female with longstanding history of intermittent complaints of fatigue presents today complaining again of feeling extremely tired. She states she is always fatigued, but she feels she is more tired than usual.  She has felt more fatigued over the last 3-4 months. She notices this mostly in the AM.  Her tiredness is affecting her job. She struggles to stay awake. She states she could fall asleep easily. She states she wakes refreshed, but after being up for 3 hours, she gets sleepy. She states she is trying to get more sleep. She goes to bed at 10 pm and wake at 0530. She states she is using her CPAP. She was last seen by sleep specialist over a year ago. She was seen by the NP 1/2021. She denies CP, SOB, orthopnea. She does have swelling in both legs, no more than usual.  She has lost 9# since her visit in March. She is in a program called [facility]-- medically supervised per pt report. They put her on Metformin and Ozempic. She does not have diabetes. She sees a counselor every 2 weeks. She was told she had insulin resistance. She does feel down and blue. Sister got diagnosed with pancreatic cancer. She does not feel severely depressed. We reviewed lab from 3/10/2022. Hemoglobin 12.3  Hematocrit 38.4%  Platelet count 640,644    TSH 1.40    B12 220  Folate greater than 20    She states she is going to [place] [facility] and [place] in [place]. She got a Yellow Fever Vaccine.       Past Medical History:   Diagnosis Date    Allergic rhinitis     Anxiety     Asthma     Depression     Fibromyalgia 8/26/2022    Gastritis     GERD (gastroesophageal reflux disease)     Hearing disorder, sensorineural 1/21/2015    Hiatal hernia (95.9 kg)   03/10/22 220 lb 3.2 oz (99.9 kg)   02/19/22 218 lb (98.9 kg)     BP Readings from Last 3 Encounters:   08/26/22 110/68   03/10/22 118/76   12/04/21 126/80        GEN: NAD, A&O, Non-toxic. Obese  HEENT: NC/AT, JUDY, EOMI, Oral cavity Clear,  TM's NL  NECK: Supple. No thyromegaly. No JVD  LYMPH: No C/SC nodes. CV: S1 S2 NL, RRR. No murmurs, clicks or rubs. PULM: CTA, symmentric air exchange  EXT: + Bilateral lower extremity pitting edema, + lipedema  GI: Abdomen is soft, NT, BS+, No hepatomegaly. No masses. NEURO: No focal or lateralizing deficits. VASC:  No carotid bruits. Pulses symmetric  SKIN:  No rashes or lesions of concern      ASSESSMENT/PLAN:    1. Excessive daytime sleepiness  Etiology is unclear  Need to make sure her CPAP settings are appropriate  She has not been seen by her sleep specialist in a year and a half  Referral placed  Additional lab  - [id number] [facility] MD Niall, Sleep Medicine, [facility]  - CBC with Auto Differential; Future  - Comprehensive Metabolic Panel; Future  - TSH; Future    2. GAYATHRI (obstructive sleep apnea)  As above  - [id number] [facility] MD Niall, Sleep Medicine, [place]    3. B12 deficiency  Only getting B12 that is in her multivitamin  She has a long history of B12 deficiency. She is no longer getting her injection  - Vitamin B12; Future    4. Need for pneumococcal vaccination  Prevnar 20  - Pneumococcal, PCV20, PREVNAR 20, (age 25 yrs+), IM, PF    5. Immunity status testing    - Hepatitis A Antibody, Total; Future  - Hepatitis B Surface Antibody; Future    6. Need for malaria prophylaxis  Discussed potential adverse side effects  - atovaquone-proguanil (MALARONE) 250-100 MG per tablet; Take 1 tablet by mouth daily . Start 1 day prior to arrival in [place] and continue for 7 days after return  Dispense: 25 tablet; Refill: 0            Discussed medications with patient who voiced understanding of their use, indication and potential side effects.   Pt also understands the above recommendations. All questions answered. This note was generated completely or in part utilizing Dragon dictation speech recognition software. Occasionally, words are mistranscribed and despite editing, the text may contain inaccuracies due to incorrect word recognition.   If further clarification is needed please contact the office at [phone]       Electronically signed    Beverly Schmidt D.O.

## 2022-08-27 ENCOUNTER — HOSPITAL ENCOUNTER (OUTPATIENT)
Age: 62
Discharge: HOME | End: 2022-08-27
Payer: COMMERCIAL

## 2022-08-27 DIAGNOSIS — E53.8 B12 DEFICIENCY: ICD-10-CM

## 2022-08-27 DIAGNOSIS — Z01.84 IMMUNITY STATUS TESTING: ICD-10-CM

## 2022-08-27 DIAGNOSIS — G47.19 EXCESSIVE DAYTIME SLEEPINESS: ICD-10-CM

## 2022-08-27 LAB
A/G RATIO: 1.3 (ref 1.1–2.2)
ALBUMIN SERPL-MCNC: 3.7 G/DL (ref 3.4–5)
ALP BLD-CCNC: 76 U/L (ref 40–129)
ALT SERPL-CCNC: 22 U/L (ref 10–40)
ANION GAP SERPL CALCULATED.3IONS-SCNC: 11 MMOL/L (ref 3–16)
AST SERPL-CCNC: 23 U/L (ref 15–37)
BASOPHILS ABSOLUTE: 0.1 K/UL (ref 0–0.2)
BASOPHILS RELATIVE PERCENT: 1 %
BILIRUB SERPL-MCNC: <0.2 MG/DL (ref 0–1)
BUN BLDV-MCNC: 12 MG/DL (ref 7–20)
CALCIUM SERPL-MCNC: 9.7 MG/DL (ref 8.3–10.6)
CHLORIDE BLD-SCNC: 106 MMOL/L (ref 99–110)
CO2: 23 MMOL/L (ref 21–32)
CREAT SERPL-MCNC: 0.7 MG/DL (ref 0.6–1.2)
EOSINOPHILS ABSOLUTE: 0.1 K/UL (ref 0–0.6)
EOSINOPHILS RELATIVE PERCENT: 2.3 %
GFR AFRICAN AMERICAN: >60
GFR NON-AFRICAN AMERICAN: >60
GLUCOSE BLD-MCNC: 83 MG/DL (ref 70–99)
HBV SURFACE AB TITR SER: <3.5 MIU/ML
HCT VFR BLD CALC: 35.5 % (ref 36–48)
HEMOGLOBIN: 11.9 G/DL (ref 12–16)
LYMPHOCYTES ABSOLUTE: 1.2 K/UL (ref 1–5.1)
LYMPHOCYTES RELATIVE PERCENT: 22.2 %
MCH RBC QN AUTO: 30.1 PG (ref 26–34)
MCHC RBC AUTO-ENTMCNC: 33.4 G/DL (ref 31–36)
MCV RBC AUTO: 89.9 FL (ref 80–100)
MONOCYTES ABSOLUTE: 0.6 K/UL (ref 0–1.3)
MONOCYTES RELATIVE PERCENT: 11.4 %
NEUTROPHILS ABSOLUTE: 3.3 K/UL (ref 1.7–7.7)
NEUTROPHILS RELATIVE PERCENT: 63.1 %
PDW BLD-RTO: 15.2 % (ref 12.4–15.4)
PLATELET # BLD: 349 K/UL (ref 135–450)
PMV BLD AUTO: 8.8 FL (ref 5–10.5)
POTASSIUM SERPL-SCNC: 4.2 MMOL/L (ref 3.5–5.1)
RBC # BLD: 3.95 M/UL (ref 4–5.2)
SODIUM BLD-SCNC: 140 MMOL/L (ref 136–145)
TOTAL PROTEIN: 6.6 G/DL (ref 6.4–8.2)
TSH SERPL DL<=0.05 MIU/L-ACNC: 2 UIU/ML (ref 0.27–4.2)
VITAMIN B-12: 178 PG/ML (ref 211–911)
WBC # BLD: 5.2 K/UL (ref 4–11)

## 2022-08-27 PROCEDURE — 80053 COMPREHEN METABOLIC PANEL: CPT

## 2022-08-27 PROCEDURE — 84443 ASSAY THYROID STIM HORMONE: CPT

## 2022-08-27 PROCEDURE — 82607 VITAMIN B-12: CPT

## 2022-08-27 PROCEDURE — 36415 COLL VENOUS BLD VENIPUNCTURE: CPT

## 2022-08-27 PROCEDURE — 85025 COMPLETE CBC W/AUTO DIFF WBC: CPT

## 2022-08-27 PROCEDURE — 86706 HEP B SURFACE ANTIBODY: CPT

## 2022-08-27 PROCEDURE — 86708 HEPATITIS A ANTIBODY: CPT

## 2022-08-28 LAB — HAV AB SERPL IA-ACNC: POSITIVE

## 2022-09-01 ENCOUNTER — NURSE ONLY (OUTPATIENT)
Dept: INTERNAL MEDICINE CLINIC | Age: 62
End: 2022-09-01
Payer: COMMERCIAL

## 2022-09-01 PROCEDURE — 96372 THER/PROPH/DIAG INJ SC/IM: CPT | Performed by: INTERNAL MEDICINE

## 2022-09-01 PROCEDURE — 90471 IMMUNIZATION ADMIN: CPT | Performed by: INTERNAL MEDICINE

## 2022-09-01 PROCEDURE — 90739 HEPB VACC 2/4 DOSE ADULT IM: CPT | Performed by: INTERNAL MEDICINE

## 2022-09-01 RX ORDER — CYANOCOBALAMIN 1000 UG/ML
1000 INJECTION INTRAMUSCULAR; SUBCUTANEOUS ONCE
Status: COMPLETED | OUTPATIENT
Start: 2022-09-01 | End: 2022-09-01

## 2022-09-01 RX ADMIN — CYANOCOBALAMIN 1000 MCG: 1000 INJECTION INTRAMUSCULAR; SUBCUTANEOUS at 09:16

## 2022-09-07 ENCOUNTER — NURSE ONLY (OUTPATIENT)
Dept: INTERNAL MEDICINE CLINIC | Age: 62
End: 2022-09-07
Payer: COMMERCIAL

## 2022-09-07 PROCEDURE — 96372 THER/PROPH/DIAG INJ SC/IM: CPT | Performed by: INTERNAL MEDICINE

## 2022-09-07 RX ORDER — CYANOCOBALAMIN 1000 UG/ML
1000 INJECTION INTRAMUSCULAR; SUBCUTANEOUS ONCE
Status: COMPLETED | OUTPATIENT
Start: 2022-09-07 | End: 2022-09-07

## 2022-09-07 RX ADMIN — CYANOCOBALAMIN 1000 MCG: 1000 INJECTION INTRAMUSCULAR; SUBCUTANEOUS at 09:12

## 2022-09-14 ENCOUNTER — OFFICE VISIT (OUTPATIENT)
Dept: INTERNAL MEDICINE CLINIC | Age: 62
End: 2022-09-14
Payer: COMMERCIAL

## 2022-09-14 VITALS
WEIGHT: 208.2 LBS | HEART RATE: 81 BPM | HEIGHT: 62 IN | OXYGEN SATURATION: 98 % | RESPIRATION RATE: 14 BRPM | BODY MASS INDEX: 38.31 KG/M2 | DIASTOLIC BLOOD PRESSURE: 66 MMHG | SYSTOLIC BLOOD PRESSURE: 104 MMHG

## 2022-09-14 DIAGNOSIS — F40.243 ANXIETY WITH FLYING: Primary | ICD-10-CM

## 2022-09-14 DIAGNOSIS — I87.2 VENOUS INSUFFICIENCY OF BOTH LOWER EXTREMITIES: ICD-10-CM

## 2022-09-14 DIAGNOSIS — E53.8 FOLATE DEFICIENCY: ICD-10-CM

## 2022-09-14 DIAGNOSIS — D64.9 ANEMIA, UNSPECIFIED TYPE: ICD-10-CM

## 2022-09-14 DIAGNOSIS — E55.9 VITAMIN D DEFICIENCY: ICD-10-CM

## 2022-09-14 DIAGNOSIS — R73.01 IMPAIRED FASTING GLUCOSE: ICD-10-CM

## 2022-09-14 DIAGNOSIS — J45.20 MILD INTERMITTENT ASTHMA WITHOUT COMPLICATION: ICD-10-CM

## 2022-09-14 DIAGNOSIS — E53.8 B12 DEFICIENCY: ICD-10-CM

## 2022-09-14 DIAGNOSIS — I10 BENIGN ESSENTIAL HTN: ICD-10-CM

## 2022-09-14 DIAGNOSIS — M79.7 FIBROMYALGIA: ICD-10-CM

## 2022-09-14 PROCEDURE — 90674 CCIIV4 VAC NO PRSV 0.5 ML IM: CPT | Performed by: INTERNAL MEDICINE

## 2022-09-14 PROCEDURE — 96372 THER/PROPH/DIAG INJ SC/IM: CPT | Performed by: INTERNAL MEDICINE

## 2022-09-14 PROCEDURE — 99214 OFFICE O/P EST MOD 30 MIN: CPT | Performed by: INTERNAL MEDICINE

## 2022-09-14 PROCEDURE — 90471 IMMUNIZATION ADMIN: CPT | Performed by: INTERNAL MEDICINE

## 2022-09-14 RX ORDER — CYANOCOBALAMIN 1000 UG/ML
1000 INJECTION INTRAMUSCULAR; SUBCUTANEOUS ONCE
Status: COMPLETED | OUTPATIENT
Start: 2022-09-14 | End: 2022-09-14

## 2022-09-14 RX ORDER — FUROSEMIDE 40 MG/1
40 TABLET ORAL DAILY PRN
Qty: 30 TABLET | Refills: 2 | Status: CANCELLED | OUTPATIENT
Start: 2022-09-14

## 2022-09-14 RX ORDER — LORAZEPAM 0.5 MG/1
TABLET ORAL
Qty: 4 TABLET | Refills: 0 | Status: SHIPPED | OUTPATIENT
Start: 2022-09-14 | End: 2022-10-14

## 2022-09-14 RX ADMIN — CYANOCOBALAMIN 1000 MCG: 1000 INJECTION INTRAMUSCULAR; SUBCUTANEOUS at 08:02

## 2022-09-14 NOTE — PROGRESS NOTES
[facility]) Physicians  Internal Medicine  Patient Encounter  David Smith D.O., [facility]        Chief Complaint   Patient presents with    Check-Up    Medication Check       HPI: 64 y.o. female with multiple medical issues who presents today for follow up regarding the status of those issues listed below along with a medication review. Patient was recently seen on 8/26/2022 for complaint of excessive daytime sleepiness. Her B12 was back down to 178. She got started back on B12 injections. She already feels better. Her Hgb was 11.9. We referred her back to Dr. Lucinda Smith for CPAP management. Asthma-- She sees Dr. Martine Ruiz. She is doing well. She was just seen. Asmanex, Singulair, Ankur, Dymista. Depression/Anxiety/fibromyalgia--patient states her mood is under good control. Cymbalta helps with anxiety and depression and the Fibromyalgia. She always has some degree of pain. She is anxious about flying. She is going to [place]-- 16 hour flight. She is asking to have something for Anxiety. Vit D def--She is on Vit D3 2000 units daily. Folate Def--She is no longer on a supplement. B12 Def--She is back to B12 injections. She has been told she is allergic to Gluten. However, it does not sound like she has had the blood work specific to celiac disease screening. She had a skin test and reacted to wheat according to her report. She gets gastro bloating and hives when eating a diet with gluten. She is on a Gluten free diet. She states she clearly feels better on a gluten-free diet. Venous insufficiency--Patient does wear the compression stockings daily. The swelling is much better. GAYATHRI-- She is compliant with her CPAP. Energy level is good. HTN-- She denies HA's, dizziness, lightheadedness. IFG-- She has lost weight with Metformin and Ozempic. She does not have diabetes. SHe is down 12# since March.     Lab Results   Component Value Date    LABA1C 6.1 03/10/2022 weeks at a time. Azelastine-Fluticasone (DYMISTA NA) by Nasal route daily. Multiple Vitamin (MULTIVITAMIN PO) Take  by mouth daily. Cholecalciferol (VITAMIN D) 2000 UNIT TABS Take  by mouth daily. mometasone (ASMANEX) 220 MCG/INH inhaler Inhale 1 puff into the lungs 2 times daily. montelukast (SINGULAIR) 10 MG TABS Take 1 Tab by mouth daily. Review of Systems -       OBJECTIVE:  Vitals:    09/14/22 0749   BP: 104/66   Pulse: 81   Resp: 14   SpO2: 98%   Weight: 208 lb 3.2 oz (94.4 kg)   Height: 5' 2\" (1.575 m)     Body mass index is 38.08 kg/m². Wt Readings from Last 3 Encounters:   09/14/22 208 lb 3.2 oz (94.4 kg)   08/26/22 211 lb 6.4 oz (95.9 kg)   03/10/22 220 lb 3.2 oz (99.9 kg)     BP Readings from Last 3 Encounters:   09/14/22 104/66   08/26/22 110/68   03/10/22 118/76        GEN: NAD, A&O, Non-toxic, Obese  HEENT: NC/AT, JUDY, EOMI, Anicteric  NECK: Supple. Large neck circumference. No JVD. No masses  LYMPH: No C/SC nodes. CV: Regular rhythm, Rate NL, No murmurs, No ectopy. PULM: CTA, No wheezing  EXT: 1+ LE edema, pitting, + Lipedema. GI: Abdomen is soft, NT, BS+, No hepatomegaly. NEURO: No focal or lateralizing deficits. No ataxia  VASC:  No carotid bruits.  + Spider veins BL LE. SKIN:  No rashes of concern. MS: No synovitis. ASSESSMENT/PLAN:    1. B12 deficiency  2. Vitamin D deficiency  3. Folate deficiency  ? Celiac disease  It definitely sounds like she has gluten sensitivity  She has never formally been diagnosed with celiac disease. Since she is feeling well, I do not want to make her feel badly by eating gluten just to check her antibody levels  We will check the tissue transglutaminase antibody as a screening but since she is on a gluten-free diet this will likely be negative. - Vitamin B12 & Folate; Future  - TISSUE TRANSGLUTAMINASE, IGA; Future  - IgA; Future    4.  Venous insufficiency of both lower extremities  Controlled with compression stockings and Lasix as needed    5. Anxiety with flying  Patient will be flying to [place]. She was requesting something for her nerves. Will use Ativan  Discussed potential adverse side effects and habit-forming nature of the medication  - LORazepam (ATIVAN) 0.5 MG tablet; Take 1 tab 30-45 minutes before flight. May take 1 additional tab if needed during flight. Dispense: 4 tablet; Refill: 0    6. Anemia, unspecified type  Recheck in 1 month after being on B12  - TISSUE TRANSGLUTAMINASE, IGA; Future  - IgA; Future    7. Benign essential HTN  Blood pressure is well controlled  Continue valsartan    8. Fibromyalgia  Stable  Continue Cymbalta  Advised to stay physically active is much as possible    9. Impaired fasting glucose  Condition is asymptomatic  She is on metformin and Ozempic through a weight loss program called [facility]. She will continue on this regimen for weight loss purposes. - Hemoglobin A1C; Future    10.  Mild intermittent asthma without complication  Well-controlled

## 2022-09-14 NOTE — PATIENT INSTRUCTIONS
To do list:      #1 hepatitis B #2 in a couple of weeks. #2 lab work after you return from [place].

## 2022-09-22 ENCOUNTER — NURSE ONLY (OUTPATIENT)
Dept: INTERNAL MEDICINE CLINIC | Age: 62
End: 2022-09-22
Payer: COMMERCIAL

## 2022-09-22 PROCEDURE — 96372 THER/PROPH/DIAG INJ SC/IM: CPT | Performed by: INTERNAL MEDICINE

## 2022-09-22 RX ORDER — CYANOCOBALAMIN 1000 UG/ML
1000 INJECTION INTRAMUSCULAR; SUBCUTANEOUS ONCE
Status: COMPLETED | OUTPATIENT
Start: 2022-09-22 | End: 2022-09-22

## 2022-09-22 RX ADMIN — CYANOCOBALAMIN 1000 MCG: 1000 INJECTION INTRAMUSCULAR; SUBCUTANEOUS at 09:20

## 2022-09-29 ENCOUNTER — NURSE ONLY (OUTPATIENT)
Dept: INTERNAL MEDICINE CLINIC | Age: 62
End: 2022-09-29

## 2022-09-29 DIAGNOSIS — E53.8 B12 DEFICIENCY: Primary | ICD-10-CM

## 2022-09-29 PROCEDURE — 90471 IMMUNIZATION ADMIN: CPT | Performed by: INTERNAL MEDICINE

## 2022-09-29 PROCEDURE — 90739 HEPB VACC 2/4 DOSE ADULT IM: CPT | Performed by: INTERNAL MEDICINE

## 2022-09-29 PROCEDURE — 96372 THER/PROPH/DIAG INJ SC/IM: CPT | Performed by: INTERNAL MEDICINE

## 2022-09-29 RX ORDER — CYANOCOBALAMIN 1000 UG/ML
1000 INJECTION INTRAMUSCULAR; SUBCUTANEOUS ONCE
Status: COMPLETED | OUTPATIENT
Start: 2022-09-29 | End: 2022-09-29

## 2022-09-29 RX ADMIN — CYANOCOBALAMIN 1000 MCG: 1000 INJECTION INTRAMUSCULAR; SUBCUTANEOUS at 09:11

## 2022-10-24 ENCOUNTER — TELEPHONE (OUTPATIENT)
Dept: INTERNAL MEDICINE CLINIC | Age: 62
End: 2022-10-24

## 2022-10-24 NOTE — TELEPHONE ENCOUNTER
Patient is calling back because she has not heard back yet from anyone at office in regards to her message she left earlier today. Please have someone contact her at earliest convenience at number provided.

## 2022-10-24 NOTE — TELEPHONE ENCOUNTER
Patient returned from [facility] last  Wednesday and ever since she got back she is having stomach issues (cramping now-diarrhea last week). Every time she eats something she has extreme pain in stomach/cramping. Please call  her to discuss her concerns at  number provided.

## 2022-10-24 NOTE — TELEPHONE ENCOUNTER
Hard to know what is going on. If she had a lot of diarrhea, she might have travelers diarrhea. Its not clear. She needs an examination.

## 2022-10-25 ENCOUNTER — OFFICE VISIT (OUTPATIENT)
Dept: FAMILY MEDICINE CLINIC | Age: 62
End: 2022-10-25
Payer: COMMERCIAL

## 2022-10-25 VITALS
DIASTOLIC BLOOD PRESSURE: 82 MMHG | SYSTOLIC BLOOD PRESSURE: 122 MMHG | BODY MASS INDEX: 36.8 KG/M2 | HEIGHT: 62 IN | HEART RATE: 79 BPM | WEIGHT: 200 LBS | OXYGEN SATURATION: 99 % | TEMPERATURE: 97.1 F

## 2022-10-25 DIAGNOSIS — A09 TRAVELER'S DIARRHEA: Primary | ICD-10-CM

## 2022-10-25 PROCEDURE — 3074F SYST BP LT 130 MM HG: CPT | Performed by: NURSE PRACTITIONER

## 2022-10-25 PROCEDURE — 99214 OFFICE O/P EST MOD 30 MIN: CPT | Performed by: NURSE PRACTITIONER

## 2022-10-25 PROCEDURE — 3078F DIAST BP <80 MM HG: CPT | Performed by: NURSE PRACTITIONER

## 2022-10-25 RX ORDER — DICYCLOMINE HYDROCHLORIDE 10 MG/1
10 CAPSULE ORAL 4 TIMES DAILY
Qty: 360 CAPSULE | Refills: 1 | Status: SHIPPED | OUTPATIENT
Start: 2022-10-25

## 2022-10-25 RX ORDER — DICYCLOMINE HYDROCHLORIDE 10 MG/1
10 CAPSULE ORAL 4 TIMES DAILY
Qty: 360 CAPSULE | Refills: 1 | Status: SHIPPED | OUTPATIENT
Start: 2022-10-25 | End: 2022-10-25

## 2022-10-25 RX ORDER — ONDANSETRON 4 MG/1
4 TABLET, ORALLY DISINTEGRATING ORAL 3 TIMES DAILY PRN
Qty: 21 TABLET | Refills: 0 | Status: SHIPPED | OUTPATIENT
Start: 2022-10-25

## 2022-10-25 RX ORDER — ONDANSETRON 4 MG/1
4 TABLET, ORALLY DISINTEGRATING ORAL 3 TIMES DAILY PRN
Qty: 21 TABLET | Refills: 0 | Status: SHIPPED | OUTPATIENT
Start: 2022-10-25 | End: 2022-10-25

## 2022-10-25 RX ORDER — CIPROFLOXACIN 500 MG/1
500 TABLET, FILM COATED ORAL 2 TIMES DAILY
Qty: 14 TABLET | Refills: 0 | Status: SHIPPED | OUTPATIENT
Start: 2022-10-25 | End: 2022-11-01

## 2022-10-25 RX ORDER — CIPROFLOXACIN 500 MG/1
500 TABLET, FILM COATED ORAL 2 TIMES DAILY
Qty: 14 TABLET | Refills: 0 | Status: SHIPPED | OUTPATIENT
Start: 2022-10-25 | End: 2022-10-25

## 2022-10-25 ASSESSMENT — ENCOUNTER SYMPTOMS
SINUS PRESSURE: 0
SHORTNESS OF BREATH: 0
CONSTIPATION: 0
COUGH: 0
EYE REDNESS: 0
COLOR CHANGE: 0
BACK PAIN: 0
RHINORRHEA: 0
ABDOMINAL PAIN: 1
EYE ITCHING: 0
VOMITING: 0
DIARRHEA: 1
NAUSEA: 1
WHEEZING: 0
BLOOD IN STOOL: 0
CHEST TIGHTNESS: 0
RECTAL PAIN: 0
SORE THROAT: 0

## 2022-10-25 NOTE — TELEPHONE ENCOUNTER
Patient requested to be seen today but no appointments available. Scheduled her at [facility] office with Kim Quach to address.

## 2022-10-25 NOTE — ASSESSMENT & PLAN NOTE
Patient is experiencing abdominal discomfort after traveling to [facility]. Based on timing of symptoms, it is likely from something she ate or drank while out of the country. Will order stool samples, as well as start a course of antibiotics to cover common GI bacteria. Patient educated to try to get stool samples prior to initiating antibiotic. Bentyl and zofran prescribed for symptom management. Patient voiced understanding. Call if symptoms do not improve.

## 2022-10-25 NOTE — PROGRESS NOTES
Lia Moctezuma (:  1960) is a 64 y.o. female,Established patient, here for evaluation of the following chief complaint(s): Other (Stomach cramping and gas)      ASSESSMENT/PLAN:  1. Traveler's diarrhea  Assessment & Plan:  Patient is experiencing abdominal discomfort after traveling to [facility]. Based on timing of symptoms, it is likely from something she ate or drank while out of the country. Will order stool samples, as well as start a course of antibiotics to cover common GI bacteria. Patient educated to try to get stool samples prior to initiating antibiotic. Bentyl and zofran prescribed for symptom management. Patient voiced understanding. Call if symptoms do not improve. Orders:  -     GI Bacterial Pathogens By PCR; Future  -     GIARDIA ANTIGEN; Future  -     C DIFF TOXIN/ANTIGEN; Future  -     ROTAVIRUS ANTIGEN, STOOL; Future    No follow-ups on file. SUBJECTIVE/OBJECTIVE:  Nausea, cramping, pain for 1.5 weeks after traveling back from [facility]. Denies vomiting, diarrhea, fevers, blood in stool. States her stool is loose, more frequent. Pain is worse after she eats or drinks, she has not been able to eat much because of this. She states she has had black stools the past 2 days, she has been taking pepto bismol multiple times a day for past 6 days.  was sick during trip but resolved prior to leaving. Current Outpatient Medications   Medication Sig Dispense Refill    ciprofloxacin (CIPRO) 500 MG tablet Take 1 tablet by mouth 2 times daily for 7 days 14 tablet 0    dicyclomine (BENTYL) 10 MG capsule Take 1 capsule by mouth 4 times daily 360 capsule 1    ondansetron (ZOFRAN-ODT) 4 MG disintegrating tablet Take 1 tablet by mouth 3 times daily as needed for Nausea or Vomiting 21 tablet 0    traMADol (ULTRAM) 50 MG tablet Take 50 mg by mouth in the morning and 50 mg in the evening. As needed.       minoxidil (LONITEN) 2.5 MG tablet Take 2.5 mg by mouth daily      metFORMIN (GLUCOPHAGE) 500 MG tablet Take 500 mg by mouth daily (with breakfast)      Semaglutide, 1 MG/DOSE, (OZEMPIC, 1 MG/DOSE,) 2 MG/1.5ML SOPN Inject 1 mg into the skin once a week      atovaquone-proguanil (MALARONE) 250-100 MG per tablet Take 1 tablet by mouth daily . Start 1 day prior to arrival in [place] and continue for 7 days after return 25 tablet 0    valsartan (DIOVAN) 80 MG tablet TAKE 1 TABLET BY MOUTH DAILY 90 tablet 3    tiZANidine (ZANAFLEX) 4 MG tablet TAKE 1 TABLET BY MOUTH EVERY NIGHT 90 tablet 1    DULoxetine (CYMBALTA) 60 MG extended release capsule TAKE 1 CAPSULE BY MOUTH EVERY DAY 90 capsule 1    solifenacin (VESICARE) 10 MG tablet Take 10 mg by mouth daily      meloxicam (MOBIC) 15 MG tablet Take 15 mg by mouth daily      potassium chloride (KLOR-CON M) 20 MEQ extended release tablet TAKE 1 TABLET BY MOUTH DAILY if taking Lasix 30 tablet 2    furosemide (LASIX) 40 MG tablet Take 1 tablet by mouth daily as needed (swelling) 30 tablet 2    hydrocortisone 2.5 % cream Apply to the eyebrows daily if needed for scaling. Use no more than 2 weeks at a time. 30 g 2    Azelastine-Fluticasone (DYMISTA NA) by Nasal route daily. Multiple Vitamin (MULTIVITAMIN PO) Take  by mouth daily. Cholecalciferol (VITAMIN D) 2000 UNIT TABS Take  by mouth daily. 30 tablet     mometasone (ASMANEX) 220 MCG/INH inhaler Inhale 1 puff into the lungs 2 times daily. montelukast (SINGULAIR) 10 MG TABS Take 1 Tab by mouth daily. gabapentin (NEURONTIN) 300 MG capsule Take 300 mg by mouth at bedtime. (Patient not taking: Reported on 10/25/2022)       Current Facility-Administered Medications   Medication Dose Route Frequency Provider Last Rate Last Admin    cyanocobalamin injection 1,000 mcg  1,000 mcg IntraMUSCular Once [facility],            Review of Systems   Constitutional:  Negative for chills, diaphoresis, fatigue and fever.    HENT:  Negative for congestion, ear pain, postnasal drip, rhinorrhea, sinus pressure, sneezing and sore throat. Eyes:  Negative for redness and itching. Respiratory:  Negative for cough, chest tightness, shortness of breath and wheezing. Cardiovascular:  Negative for chest pain and palpitations. Gastrointestinal:  Positive for abdominal pain, diarrhea and nausea. Negative for blood in stool, constipation, rectal pain and vomiting. Endocrine: Negative for cold intolerance and heat intolerance. Genitourinary:  Negative for difficulty urinating, dysuria, flank pain, frequency, hematuria and urgency. Musculoskeletal:  Negative for arthralgias, back pain, joint swelling and myalgias. Skin:  Negative for color change, pallor, rash and wound. Allergic/Immunologic: Negative for environmental allergies and food allergies. Neurological:  Negative for dizziness, seizures, syncope, weakness, light-headedness, numbness and headaches. Hematological:  Negative for adenopathy. Does not bruise/bleed easily. Psychiatric/Behavioral:  Negative for confusion, sleep disturbance and suicidal ideas. The patient is not nervous/anxious and is not hyperactive. Vitals:    10/25/22 1459   BP: 122/82   Site: Left Upper Arm   Position: Sitting   Cuff Size: Large Adult   Pulse: 79   Temp: 97.1 °F (36.2 °C)   SpO2: 99%   Weight: 200 lb (90.7 kg)   Height: 5' 2\" (1.575 m)       Physical Exam  Vitals reviewed. Constitutional:       Appearance: Normal appearance. She is well-developed. HENT:      Head: Normocephalic and atraumatic. Right Ear: Hearing normal.      Left Ear: Hearing normal.      Nose: No mucosal edema. Right Sinus: No maxillary sinus tenderness or frontal sinus tenderness. Left Sinus: No maxillary sinus tenderness or frontal sinus tenderness. Mouth/Throat: Tonsils: No tonsillar abscesses. Eyes:      Extraocular Movements: Extraocular movements intact. Pupils: Pupils are equal, round, and reactive to light.    Cardiovascular:      Rate and Rhythm: Normal rate and regular rhythm. Pulses: Normal pulses. Heart sounds: Normal heart sounds. Pulmonary:      Effort: Pulmonary effort is normal.      Breath sounds: Normal breath sounds. Abdominal:      General: Bowel sounds are decreased. Palpations: Abdomen is soft. Tenderness: There is abdominal tenderness in the epigastric area. Lymphadenopathy:      Head:      Right side of head: No submental, submandibular, tonsillar, preauricular, posterior auricular or occipital adenopathy. Left side of head: No submental, submandibular, tonsillar, preauricular, posterior auricular or occipital adenopathy. Skin:     General: Skin is warm and dry. Neurological:      Mental Status: She is alert. Psychiatric:         Mood and Affect: Mood normal.         Behavior: Behavior normal.         On this date 10/25/2022 I have spent 30 minutes reviewing previous notes, test results and face to face with the patient discussing the diagnosis and importance of compliance with the treatment plan as well as documenting on the day of the visit. An electronic signature was used to authenticate this note.     --ASHLY Mane - CNP

## 2022-10-26 ENCOUNTER — TELEPHONE (OUTPATIENT)
Dept: FAMILY MEDICINE CLINIC | Age: 62
End: 2022-10-26

## 2022-10-26 NOTE — TELEPHONE ENCOUNTER
Pt can't give a stool sample  She's just not able to go  She was told not to take the antibiotics prescribed yesterday until she could give a stool sample  She would like to start the med as soon as possible

## 2022-10-26 NOTE — TELEPHONE ENCOUNTER
Called and spoke to pt, relayed message that it is okay to start abx now. Confirmed understanding with no additional questions or concerns.

## 2022-10-31 ENCOUNTER — NURSE ONLY (OUTPATIENT)
Dept: INTERNAL MEDICINE CLINIC | Age: 62
End: 2022-10-31
Payer: COMMERCIAL

## 2022-10-31 PROCEDURE — 96372 THER/PROPH/DIAG INJ SC/IM: CPT | Performed by: INTERNAL MEDICINE

## 2022-10-31 RX ORDER — CYANOCOBALAMIN 1000 UG/ML
1000 INJECTION, SOLUTION INTRAMUSCULAR; SUBCUTANEOUS ONCE
Status: COMPLETED | OUTPATIENT
Start: 2022-10-31 | End: 2022-10-31

## 2022-10-31 RX ADMIN — CYANOCOBALAMIN 1000 MCG: 1000 INJECTION, SOLUTION INTRAMUSCULAR; SUBCUTANEOUS at 09:12

## 2022-11-08 DIAGNOSIS — M50.30 DDD (DEGENERATIVE DISC DISEASE), CERVICAL: ICD-10-CM

## 2022-11-08 DIAGNOSIS — M79.7 FIBROMYALGIA: ICD-10-CM

## 2022-11-08 RX ORDER — TIZANIDINE 4 MG/1
TABLET ORAL
Qty: 90 TABLET | Refills: 1 | Status: SHIPPED | OUTPATIENT
Start: 2022-11-08

## 2022-12-01 ENCOUNTER — NURSE ONLY (OUTPATIENT)
Dept: INTERNAL MEDICINE CLINIC | Age: 62
End: 2022-12-01
Payer: COMMERCIAL

## 2022-12-01 PROCEDURE — 96372 THER/PROPH/DIAG INJ SC/IM: CPT | Performed by: INTERNAL MEDICINE

## 2022-12-01 RX ORDER — CYANOCOBALAMIN 1000 UG/ML
1000 INJECTION, SOLUTION INTRAMUSCULAR; SUBCUTANEOUS ONCE
Status: COMPLETED | OUTPATIENT
Start: 2022-12-01 | End: 2022-12-01

## 2022-12-01 RX ADMIN — CYANOCOBALAMIN 1000 MCG: 1000 INJECTION, SOLUTION INTRAMUSCULAR; SUBCUTANEOUS at 09:06

## 2022-12-27 RX ORDER — DULOXETIN HYDROCHLORIDE 60 MG/1
CAPSULE, DELAYED RELEASE ORAL
Qty: 90 CAPSULE | Refills: 1 | Status: SHIPPED | OUTPATIENT
Start: 2022-12-27

## 2022-12-29 ENCOUNTER — NURSE ONLY (OUTPATIENT)
Dept: INTERNAL MEDICINE CLINIC | Age: 62
End: 2022-12-29
Payer: COMMERCIAL

## 2022-12-29 DIAGNOSIS — E53.8 B12 DEFICIENCY: Primary | ICD-10-CM

## 2022-12-29 PROCEDURE — 96372 THER/PROPH/DIAG INJ SC/IM: CPT | Performed by: INTERNAL MEDICINE

## 2022-12-29 RX ORDER — CYANOCOBALAMIN 1000 UG/ML
1000 INJECTION, SOLUTION INTRAMUSCULAR; SUBCUTANEOUS ONCE
Status: COMPLETED | OUTPATIENT
Start: 2022-12-29 | End: 2022-12-29

## 2022-12-29 RX ADMIN — CYANOCOBALAMIN 1000 MCG: 1000 INJECTION, SOLUTION INTRAMUSCULAR; SUBCUTANEOUS at 09:14

## 2023-02-20 DIAGNOSIS — I87.2 VENOUS INSUFFICIENCY OF BOTH LOWER EXTREMITIES: ICD-10-CM

## 2023-02-20 RX ORDER — FUROSEMIDE 40 MG/1
TABLET ORAL
Qty: 30 TABLET | Refills: 2 | Status: SHIPPED | OUTPATIENT
Start: 2023-02-20

## 2023-03-14 ENCOUNTER — OFFICE VISIT (OUTPATIENT)
Dept: INTERNAL MEDICINE CLINIC | Age: 63
End: 2023-03-14
Payer: COMMERCIAL

## 2023-03-14 VITALS
OXYGEN SATURATION: 98 % | HEART RATE: 89 BPM | SYSTOLIC BLOOD PRESSURE: 112 MMHG | HEIGHT: 62 IN | RESPIRATION RATE: 14 BRPM | BODY MASS INDEX: 35.7 KG/M2 | DIASTOLIC BLOOD PRESSURE: 70 MMHG | WEIGHT: 194 LBS

## 2023-03-14 DIAGNOSIS — E53.8 FOLATE DEFICIENCY: ICD-10-CM

## 2023-03-14 DIAGNOSIS — E53.8 B12 DEFICIENCY: ICD-10-CM

## 2023-03-14 DIAGNOSIS — I10 BENIGN ESSENTIAL HTN: ICD-10-CM

## 2023-03-14 DIAGNOSIS — Z13.1 SCREENING FOR DIABETES MELLITUS: ICD-10-CM

## 2023-03-14 DIAGNOSIS — Z00.00 ANNUAL PHYSICAL EXAM: Primary | ICD-10-CM

## 2023-03-14 DIAGNOSIS — Z12.31 ENCOUNTER FOR SCREENING MAMMOGRAM FOR MALIGNANT NEOPLASM OF BREAST: ICD-10-CM

## 2023-03-14 DIAGNOSIS — G47.33 OSA (OBSTRUCTIVE SLEEP APNEA): ICD-10-CM

## 2023-03-14 DIAGNOSIS — Z00.00 ANNUAL PHYSICAL EXAM: ICD-10-CM

## 2023-03-14 DIAGNOSIS — R73.01 IMPAIRED FASTING GLUCOSE: ICD-10-CM

## 2023-03-14 DIAGNOSIS — E55.9 VITAMIN D DEFICIENCY: ICD-10-CM

## 2023-03-14 DIAGNOSIS — Z80.0 FAMILY HISTORY OF PANCREATIC CANCER: ICD-10-CM

## 2023-03-14 DIAGNOSIS — E66.01 CLASS 2 SEVERE OBESITY DUE TO EXCESS CALORIES WITH SERIOUS COMORBIDITY AND BODY MASS INDEX (BMI) OF 35.0 TO 35.9 IN ADULT (HCC): ICD-10-CM

## 2023-03-14 LAB
ALBUMIN SERPL-MCNC: 4.3 G/DL (ref 3.4–5)
ALBUMIN/GLOB SERPL: 1.5 {RATIO} (ref 1.1–2.2)
ALP SERPL-CCNC: 99 U/L (ref 40–129)
ALT SERPL-CCNC: 20 U/L (ref 10–40)
ANION GAP SERPL CALCULATED.3IONS-SCNC: 12 MMOL/L (ref 3–16)
AST SERPL-CCNC: 18 U/L (ref 15–37)
BASOPHILS # BLD: 0.1 K/UL (ref 0–0.2)
BASOPHILS NFR BLD: 1.3 %
BILIRUB SERPL-MCNC: 0.3 MG/DL (ref 0–1)
BUN SERPL-MCNC: 10 MG/DL (ref 7–20)
CALCIUM SERPL-MCNC: 10.5 MG/DL (ref 8.3–10.6)
CHLORIDE SERPL-SCNC: 101 MMOL/L (ref 99–110)
CHOLEST SERPL-MCNC: 198 MG/DL (ref 0–199)
CO2 SERPL-SCNC: 30 MMOL/L (ref 21–32)
CREAT SERPL-MCNC: 0.7 MG/DL (ref 0.6–1.2)
DEPRECATED RDW RBC AUTO: 14.2 % (ref 12.4–15.4)
EOSINOPHIL # BLD: 0.1 K/UL (ref 0–0.6)
EOSINOPHIL NFR BLD: 2.3 %
EST. AVERAGE GLUCOSE BLD GHB EST-MCNC: 111.2 MG/DL
GFR SERPLBLD CREATININE-BSD FMLA CKD-EPI: >60 ML/MIN/{1.73_M2}
GLUCOSE SERPL-MCNC: 81 MG/DL (ref 70–99)
HBA1C MFR BLD: 5.5 %
HCT VFR BLD AUTO: 41.3 % (ref 36–48)
HDLC SERPL-MCNC: 69 MG/DL (ref 40–60)
HGB BLD-MCNC: 13.6 G/DL (ref 12–16)
LDLC SERPL CALC-MCNC: 113 MG/DL
LYMPHOCYTES # BLD: 1.4 K/UL (ref 1–5.1)
LYMPHOCYTES NFR BLD: 25.4 %
MCH RBC QN AUTO: 30 PG (ref 26–34)
MCHC RBC AUTO-ENTMCNC: 33 G/DL (ref 31–36)
MCV RBC AUTO: 90.9 FL (ref 80–100)
MONOCYTES # BLD: 0.4 K/UL (ref 0–1.3)
MONOCYTES NFR BLD: 8.1 %
NEUTROPHILS # BLD: 3.4 K/UL (ref 1.7–7.7)
NEUTROPHILS NFR BLD: 62.9 %
PLATELET # BLD AUTO: 358 K/UL (ref 135–450)
PMV BLD AUTO: 8.8 FL (ref 5–10.5)
POTASSIUM SERPL-SCNC: 4.5 MMOL/L (ref 3.5–5.1)
PROT SERPL-MCNC: 7.1 G/DL (ref 6.4–8.2)
RBC # BLD AUTO: 4.54 M/UL (ref 4–5.2)
SODIUM SERPL-SCNC: 143 MMOL/L (ref 136–145)
TRIGL SERPL-MCNC: 78 MG/DL (ref 0–150)
TSH SERPL DL<=0.005 MIU/L-ACNC: 2.76 UIU/ML (ref 0.27–4.2)
VLDLC SERPL CALC-MCNC: 16 MG/DL
WBC # BLD AUTO: 5.3 K/UL (ref 4–11)

## 2023-03-14 PROCEDURE — 99396 PREV VISIT EST AGE 40-64: CPT | Performed by: INTERNAL MEDICINE

## 2023-03-14 PROCEDURE — 96372 THER/PROPH/DIAG INJ SC/IM: CPT | Performed by: INTERNAL MEDICINE

## 2023-03-14 PROCEDURE — 3074F SYST BP LT 130 MM HG: CPT | Performed by: INTERNAL MEDICINE

## 2023-03-14 PROCEDURE — 3078F DIAST BP <80 MM HG: CPT | Performed by: INTERNAL MEDICINE

## 2023-03-14 RX ORDER — CYANOCOBALAMIN 1000 UG/ML
1000 INJECTION, SOLUTION INTRAMUSCULAR; SUBCUTANEOUS ONCE
Status: COMPLETED | OUTPATIENT
Start: 2023-03-14 | End: 2023-03-14

## 2023-03-14 RX ADMIN — CYANOCOBALAMIN 1000 MCG: 1000 INJECTION, SOLUTION INTRAMUSCULAR; SUBCUTANEOUS at 08:31

## 2023-03-14 SDOH — ECONOMIC STABILITY: FOOD INSECURITY: WITHIN THE PAST 12 MONTHS, YOU WORRIED THAT YOUR FOOD WOULD RUN OUT BEFORE YOU GOT MONEY TO BUY MORE.: NEVER TRUE

## 2023-03-14 SDOH — ECONOMIC STABILITY: INCOME INSECURITY: HOW HARD IS IT FOR YOU TO PAY FOR THE VERY BASICS LIKE FOOD, HOUSING, MEDICAL CARE, AND HEATING?: NOT HARD AT ALL

## 2023-03-14 SDOH — ECONOMIC STABILITY: FOOD INSECURITY: WITHIN THE PAST 12 MONTHS, THE FOOD YOU BOUGHT JUST DIDN'T LAST AND YOU DIDN'T HAVE MONEY TO GET MORE.: NEVER TRUE

## 2023-03-14 SDOH — ECONOMIC STABILITY: HOUSING INSECURITY
IN THE LAST 12 MONTHS, WAS THERE A TIME WHEN YOU DID NOT HAVE A STEADY PLACE TO SLEEP OR SLEPT IN A SHELTER (INCLUDING NOW)?: NO

## 2023-03-14 ASSESSMENT — PATIENT HEALTH QUESTIONNAIRE - PHQ9
SUM OF ALL RESPONSES TO PHQ9 QUESTIONS 1 & 2: 2
SUM OF ALL RESPONSES TO PHQ QUESTIONS 1-9: 2
SUM OF ALL RESPONSES TO PHQ QUESTIONS 1-9: 2
1. LITTLE INTEREST OR PLEASURE IN DOING THINGS: 1
SUM OF ALL RESPONSES TO PHQ QUESTIONS 1-9: 2
2. FEELING DOWN, DEPRESSED OR HOPELESS: 1
SUM OF ALL RESPONSES TO PHQ QUESTIONS 1-9: 2

## 2023-03-14 NOTE — PATIENT INSTRUCTIONS
Preventive plan of care for Taurus Lorenzana        3/14/2023           Preventive Measures Status       Recommendations for screening   Colon Cancer Screen   Last colonoscopy: 01/18/2016, Dr. Alexus Arrington Colonoscopy due every 10 years--2026    Breast Cancer Screen  Last mammogram: 2/19/2022 Repeat yearly. Cervical Cancer Screen   Last PAP smear: 3/2022 Dr. Cleotilde Crigler. Repeat every 3 years or as advised by gynecologist.  Ginny Gee still require a pelvic, rectal, and breast exam yearly. Osteoporosis Screen   Last DXA scan: None Test is due at age 72. Diabetes Screen  Glucose (mg/dL)   Date Value   08/27/2022 83    Test recommended and ordered. Cholesterol Screen   Lab Results   Component Value Date    CHOL 183 03/10/2022    TRIG 62 03/10/2022    HDL 49 03/10/2022    LDLCALC 122 (H) 03/10/2022    Test recommended and ordered   HIV screening recommended for those ages 12-76 NO MATTER THE RISK FOR HIV--  9/9/2016  No need to repeat at this time    Hepatitis C screening recommended for those between the ages of 25 and 79--9/9/2016 No need to repeat at this time    Aspirin for Cardiovascular Prevention   No Aspirin not indicated at this time due to your low 10-year atherosclerotic cardiovascular disease risk score.     Recommended Immunizations    Immunization History   Administered Date(s) Administered    COVID-19, PFIZER Bivalent BOOSTER, DO NOT Dilute, (age 12y+), IM, 30 mcg/0.3 mL 09/12/2022    COVID-19, PFIZER GRAY top, DO NOT Dilute, (age 15 y+), IM, 30 mcg/0.3 mL 05/02/2022    COVID-19, PFIZER PURPLE top, DILUTE for use, (age 15 y+), 30mcg/0.3mL 02/23/2021, 03/16/2021, 10/07/2021    Hepatitis B Adult (Heplisav-b) 09/01/2022, 09/29/2022    Influenza Virus Vaccine 02/25/2016, 09/22/2019, 10/17/2020    Influenza, FLUARIX, FLULAVALSaul (age 10 mo+) AND AFLURIA, (age 1 y+), PF, 0.5mL 09/06/2016, 11/28/2017, 11/29/2018    Influenza, FLUCELVAX, (age 10 mo+), MDCK, PF, 0.5mL 12/04/2021, 09/14/2022    Pneumococcal Polysaccharide (Flmvpnzdu26) 02/25/2016    Pneumococcal conjugate PCV20, PF (Prevnar 20) 08/26/2022    Tdap (Boostrix, Adacel) 08/11/2009, 11/27/2019    Yellow Fever (YF-Vax) 07/01/2022    Zoster Recombinant (Shingrix) 08/18/2021, 12/04/2021        Influenza vaccine:  recommended every fall    Pneumonia vaccine: Prevnar 20 due at age 72    Tetanus vaccine:  tetanus and diptheria/Pertussis vaccine (Td/Tdap) recommended every 10 years- Tdap is due 2029    Shingles vaccine:  Shingrx is complete         Additional Recommendations   1. Use sunscreen daily to help reduce the risk of skin cancer  2. Continue working on lifestyle modification including a calorie restricted and portion control diet focusing on low-fat options as well as low carb options. 3. Update your eye exam every 2 years to screen for glaucoma, cataracts, and macular degeneration  4. Always wear a seatbelt while riding in a car  5. Can consider genetic counseling and testing regarding family history of cancers  All care gaps identified and addressed  All vaccines are up-to-date  Prescription provided for mammogram.  Suspect improvement care gaps identified and addressed care gaps identified all care gaps identified and addressed      Here are a few  Reliable websites with a variety of health and wellness information:   www.mylifecheck. heart. org     www.nutritionsource. org     www. americanheart. org     www. diabetes. org      www.menopause. org     www.[facility]     [email or web address])      If you want to feel better these are the FUNDAMENTAL PILLARS of Wellness:    Make it EASY to do the RIGHT THINGS.     1)  You can choose to Get 150 min/week of moderate exercise (can talk but can't sing) or 75 min/week of vigorous exercise (can't talk)   This will enhance your sense of well being (Exercise is as good as medicine for depression.)    2)  You can choose to Get 7-9 hours of sleep per night    Detoxifies your brain, reduces risk of dementia    3)  You can choose to Strength Train 2 x a week on non-consecutive days   This will improve function and reduce risk of injury. Body weight type exercises such as Yoga and Pilates are good    4)  You can choose good nutrition. Only eat your goal weight (in lbs) x 10 calories/day and get 5 servings of Vegetables/day   Plant based diets reduce risk of heart attack/stroke and will help you feel full on less food. Avoid highly processed foods and processed carbohydrates. 5)  You can choose moderate alcohol intake < 1-2 drinks/day   Alcohol will disrupt your sleep and add calories to your day    6)  You can choose to develop a Charismatic/Supportive relationship. This will strengthen your resilience for the ups and downs. 7)  You can choose to Practice Mindfulness. An hour a day of prayer/meditation/gratitude will change your life! Here are some recommendations for weight loss:  Check into The GI Diet or \"Primal diet\", Intermittent fasting, Paleo diet, Mediterranean diet    Take your desired weight in pounds and multiply by 10 and that is your average daily calorie allowance. For example if you wish to weigh 170 lb x 10 = 1700 andrew/day (this is how to gradually lose the weight and maintain your desired weight). Avoid soda/coke and all sugary liquids (\"wet carbs\") => Drink ice water instead    Drink a large glass of ice water before meals and EAT SLOWLY (talk while you eat)! Rethink your hunger => it means your losing weight. Minimize carbohydrates as they stimulate your appetite.   Avoid Bread, Rice, Pasta and Potatoes      Avoid eating calories after 6 pm  Try taking your calories only in 6 hours of the day - fast the rest of the day

## 2023-03-14 NOTE — PROGRESS NOTES
[facility]) Physicians  Internal Medicine  Patient Encounter  [place] D.O., [facility] Preventative Physical    Chief Complaint   Patient presents with    Annual Exam       HPI-- 58 y.o. female presents today requesting a complete annual preventative history and physical.       Medical/Surgical Histories     Past Medical History:   Diagnosis Date    Allergic rhinitis     Anxiety     Asthma     Depression     Fibromyalgia 8/26/2022    Gastritis     GERD (gastroesophageal reflux disease)     Hearing disorder, sensorineural 1/21/2015    Hiatal hernia     Impaired fasting glucose 2/12/2021    Iron defic anemia     OAB (overactive bladder) 1/30/2019    Obesity     GAYATHRI (obstructive sleep apnea) 2009    PVC (premature ventricular contraction) 11/27/2019    Unspecified sleep apnea     cpap    Venous insufficiency of both lower extremities 9/6/2016    Vitamin B12 deficiency     Vitamin D deficiency           Past Surgical History:   Procedure Laterality Date    BREAST REDUCTION SURGERY      BREAST SURGERY  1996    reduction & tumor    CARPAL TUNNEL RELEASE      HYSTEROSCOPY  8/2009    and fibroid removal    LIPOSUCTION      chin    UPPER GASTROINTESTINAL ENDOSCOPY  2005    UPPER GASTROINTESTINAL ENDOSCOPY  7/22/11           Medications/Allergies     Medication Sig   tiZANidine (ZANAFLEX) 4 MG tablet TAKE 1 TABLET BY MOUTH EVERY NIGHT   DULoxetine (CYMBALTA) 60 MG extended release capsule TAKE 1 CAPSULE BY MOUTH EVERY DAY   solifenacin (VESICARE) 10 MG tablet Take 10 mg by mouth daily   valsartan (DIOVAN) 80 MG tablet Take 1 tablet by mouth daily   meloxicam (MOBIC) 15 MG tablet Take 15 mg by mouth daily   potassium chloride (KLOR-CON M) 20 MEQ extended release tablet TAKE 1 TABLET BY MOUTH DAILY if taking Lasix   furosemide (LASIX) 40 MG tablet Take 1 tablet by mouth daily as needed (swelling)   hydrocortisone 2.5 % cream Apply to the eyebrows daily if needed for scaling. Use no more than 2 weeks at a time. Azelastine-Fluticasone (DYMISTA NA) by Nasal route daily. Multiple Vitamin (MULTIVITAMIN PO) Take  by mouth daily. Cholecalciferol (VITAMIN D) 2000 UNIT TABS Take  by mouth daily. mometasone (ASMANEX) 220 MCG/INH inhaler Inhale 1 puff into the lungs 2 times daily. montelukast (SINGULAIR) 10 MG TABS Take 1 Tab by mouth daily. Allergies   Allergen Reactions    Adhesive Tape      Red skin    Diflucan [Fluconazole] Other (See Comments)     Central nervous system    Flagyl [Metronidazole Hcl]      Central nervous system    Gluten Hives     And gastri issues      Sulfacetamide Sodium-Sulfur Dermatitis    Sulfacetamide Sodium-Sulfur Rash         Substance Use History     Social History     Tobacco Use    Smoking status: Never    Smokeless tobacco: Never   Vaping Use    Vaping Use: Never used   Substance Use Topics    Alcohol use: Yes     Comment: Rare    Drug use: No          Family History     Family History   Problem Relation Age of Onset    Arthritis Mother     Heart Disease Mother     Cancer Father         prostate    Diabetes Sister     Heart Attack Sister 59    Coronary Art Dis Sister 59        PTCA with 2 stents    Pancreatic Cancer Sister 72    Lung Cancer Brother         Smoker    Breast Cancer Cousin     Ovarian Cancer Cousin         No family history of medullary thyroid cancer  Sister with new diagnosis of pancreatic cancer    REVIEW OF SYSTEMS:    CONSTITUTIONAL: Neg fever, chills, anorexia, + Sleeping difficulties, known GAYATHRI, uses CPAP.  + Weight loss-16 pounds since September 2022. She is doing another diet program.  BELLA Jarquin Has given her Ozempic 1 mg and Metformin. She has lost 30# since starting the program-- Calibrate. EYES: Neg  Blurry vision, loss of vision, double vision, itching, eye pain.   EARS:  Neg  tinnitus, vertigo, discharge or otalgia, decreased hearing  NOSE:  Neg itching, epistaxis, runny nose, or snoring  MOUTH/THROAT:  Neg Bleeding gums, hoarseness, sore throat, dysphagia, throat infections, or dentures  RESPIRATORY:   Neg SOB ,wheeze, cough, sputum, hemoptysis. No report of + TB test.  + Asthma, Allergies  CARDIOVASCULAR:  Neg Chest pain, palpitations,  dyspnea on exertion, orthopnea, paroxysmal nocturnal dyspnea, or claudication.  + Leg swelling bilaterally due to venous insufficiency, improved with compression stockings. Legs feel better with stockings on.  + Heart murmur. Echo 1/7/2020. Cardiac calcium score 319 2022-0  GASTROINTESTINAL:  Neg   Nausea, vomiting, or diarrhea, constipation, hematemesis, heart burn, dysphagia,change in bowel movements or stool caliber, hematochezia, melena, abdominal pain, or food intolerance. Colonoscopy: Yes,  + She is on a gluten free diet which helps with diarrhea. She did not test positive for celiac. GENITOURINARY:   Neg  Urinary frequency, hesitancy, polyuria, dysuria, hematuria, nocturia, incontinence, change in stream, genital pain or swelling, kidney stones, STD's. PAP/LUNA: Yes. History of overactive bladder, Gyn changed her to [address] HEMATOLOGIC/LYMPHATIC:  Neg bleeding dyscrasias, blood clots (DVT/PE), transfusions, or enlarged lymph nodes H/O easy bruising and anemia  MUSCULOSKELETAL:    + neck pain. Had seen Dr. Juliane Costa at [facility]. Now she sees Dr. Aminah Georges. Patient has fibromyalgia and does have diffuse body aches and fatigue. She remains on Cymbalta-- No changes. Also sees Dr. Brady Cheema at [facility]. Pain is improved since weight loss. Knee pain persists. NEUROLOGICAL:  Neg  Loss of Consciousness, memeory loss or forgetfulness, confusion, difficulty concentrating, seizures, insomina, aphasia or dysarthria unilateral weakness or paresthesias, ataxia, headaches, syncope, tremor, or H/O head trauma. PSYCHIATRIC:  Pos  H/O depression and anxiety, mood has been better.   Neg  personality changes, nervousness, drug or alcohol use/abuse, H/O psych counseling: YES, Psychotropics: Yes, Cymbalta  SKIN :  Neg  Rash, nail changes, sun burns, tattoos, change in moles, or skin color changes. She was diagnosed with Central, Centripetal, cicatricial Alopecia. Minoxidil caused too much growth on face. ENDOCRINE:  Neg  Polydipsia, polyuria, abnormal weight changes,heat /cold intolerance, Hair changes. No H/O Diabetes or Thyroid disease. Preventive Care:    Health Maintenance   Topic Date Due    Breast cancer screen  02/19/2023    A1C test (Diabetic or Prediabetic)  03/10/2023    Cervical cancer screen  03/11/2024    Depression Screen  03/14/2024    Colorectal Cancer Screen  01/18/2026    Lipids  03/10/2027    DTaP/Tdap/Td vaccine (3 - Td or Tdap) 11/27/2029    Flu vaccine  Completed    Shingles vaccine  Completed    Pneumococcal 0-64 years Vaccine  Completed    COVID-19 Vaccine  Completed    Hepatitis C screen  Completed    HIV screen  Completed    Hepatitis A vaccine  Aged Out    Hib vaccine  Aged Out    Meningococcal (ACWY) vaccine  Aged Out      Hx abnormal PAP: no  Sexual activity: single partner, contraception-- vasectomy,   Self-breast exams: sometimes  Last eye exam: 12/2022, normal, Glasses  Exercise: No regular exercise. Seatbelt use: Yes  Sun Screen: Yes  Dentist: Ela Rao, every 6 months      Physical Exam    Vitals:    03/14/23 0749   BP: 112/70   Pulse: 89   Resp: 14   SpO2: 98%   Weight: 194 lb (88 kg)   Height: 5' 2\" (1.575 m)     Body mass index is 35.48 kg/m². Wt Readings from Last 3 Encounters:   03/14/23 194 lb (88 kg)   10/25/22 200 lb (90.7 kg)   09/14/22 208 lb 3.2 oz (94.4 kg)     BP Readings from Last 3 Encounters:   03/14/23 112/70   10/25/22 122/82   09/14/22 104/66         GEN:  58 y.o. female who is in NAD, A&O. She appears stated age and well nourished. She is cooperative and pleasant. Obese  HEAD:  NC/AT, no lesions. EYES:  GARCIA, EOMI, No scleral icterus or conjunctival injection or discharge. Visual fields in tact to confrontation.     EARS:  EAC's clear, TM's normal.  NOSE: Nasal mucosa pale and engorged. MOUTH & THROAT:  Oral cavity is clear without mucosal lesions. Tongue is midline. Dentition is in good repair. No pharyngeal erythema or exudate. NECK:  Supple. Full ROM. Trachea is midline. No increased JVD. No thyromegaly or nodules. No neck masses. LYMPH: No C/SC/A/F nodes  CARDIAC:  S1S2 NL. Regular rhythm. No murmur/clicks/rubs. PMI is non-displaced. Soft systolic murmur. VASC:  Pedal pulses 2/4. Carotid upstrokes 2+. No bruits noted. PULM:  Lungs are CTA. Symmetric breath sounds noted. No wheezing or rales  GI:  Abdomen is soft and nontender. No distension. No organomegaly. No masses. No pulsatile masses. EXT:  No Cyanosis or clubbing. Trace lower extremity edema. Much improved. SKIN: Warm and dry, normal turgor, no rash or lesions of concern. NEURO:  Cranial nerves 2-12 are NL. Speech fluent and coherent. Strength is 5/5 in all muscle groups. Reflexes 2/4 and symmetric. MS:   No joint effusions. Full joint ROM. Multiple symmetric tender points C/W Fibromyalgia. PSYCH:  Mood and affect NL. Judgement and insight NL.      ASSESSMENT/PLAN:    1. Annual physical exam  All care gaps identified and addressed. Vaccines UTD  Breast cancer screening with mammogram  - [facility] DIGITAL SCREEN W OR WO CAD BILATERAL; Future  - Hemoglobin A1C; Future  - CBC with Auto Differential; Future  - Comprehensive Metabolic Panel; Future  - Lipid Panel; Future  - TSH; Future  - Vitamin D 25 Hydroxy; Future  - Vitamin B12 & Folate; Future    2. Encounter for screening mammogram for malignant neoplasm of breast  Mammogram  - [facility] DIGITAL SCREEN W OR WO CAD BILATERAL; Future    3. B12 deficiency  B12 injection today  - cyanocobalamin injection 1,000 mcg  - CBC with Auto Differential; Future  - Vitamin B12 & Folate; Future    4. Benign essential HTN    - CBC with Auto Differential; Future  - Comprehensive Metabolic Panel; Future  - Lipid Panel;  Future  - TSH; Future    5. Impaired fasting glucose  Suspect improvement on the Metformin and Ozempic  Recheck lab  Continue Lifestyle modification including low calorie diet focusing on Low fat/low cholesterol and low carbohydrate intake, along with  increasing cardiovascular (aerobic) exercise. - Hemoglobin A1C; Future  - Comprehensive Metabolic Panel; Future    6. Screening for diabetes mellitus    - Hemoglobin A1C; Future  - Comprehensive Metabolic Panel; Future    7. GAYATHRI (obstructive sleep apnea)  Control uncertain  Needs to see sleep  - Alejandro Lisa MD, Sleep Medicine, [facility]    8. Vitamin D deficiency    - Vitamin D 25 Hydroxy; Future    9. Folate deficiency    - Vitamin B12 & Folate; Future    10. Family history of pancreatic cancer  Family H/O Ovarian, Breast, Prostate, Pancreatic  Will refer to Hem/Onc for genetic analysis and counseling    11. Class 2 severe obesity due to excess calories with serious comorbidity and body mass index (BMI) of 35.0 to 35.9 in adult [facility])  As above             Preventive plan of care for Angelica Armendariz        3/14/2023           Preventive Measures Status       Recommendations for screening   Colon Cancer Screen   Last colonoscopy: 01/18/2016, Dr. Silvestre Coy Colonoscopy due every 10 years--2026    Breast Cancer Screen  Last mammogram: 2/19/2022 Repeat yearly. Cervical Cancer Screen   Last PAP smear: 3/2022 Dr. Haley Laguerre. Repeat every 3 years or as advised by gynecologist.  Ari Garber still require a pelvic, rectal, and breast exam yearly. Osteoporosis Screen   Last DXA scan: None Test is due at age 72. Diabetes Screen  Glucose (mg/dL)   Date Value   08/27/2022 83    Test recommended and ordered.      Cholesterol Screen   Lab Results   Component Value Date    CHOL 183 03/10/2022    TRIG 62 03/10/2022    HDL 49 03/10/2022    LDLCALC 122 (H) 03/10/2022    Test recommended and ordered   HIV screening recommended for those ages 12-76 NO MATTER THE RISK FOR HIV--  9/9/2016  No need to repeat at this time    Hepatitis C screening recommended for those between the ages of 25 and 79--9/9/2016 No need to repeat at this time    Aspirin for Cardiovascular Prevention   No Aspirin not indicated at this time due to your low 10-year atherosclerotic cardiovascular disease risk score. Recommended Immunizations    Immunization History   Administered Date(s) Administered    COVID-19, PFIZER Bivalent BOOSTER, DO NOT Dilute, (age 12y+), IM, 30 mcg/0.3 mL 09/12/2022    COVID-19, PFIZER GRAY top, DO NOT Dilute, (age 15 y+), IM, 30 mcg/0.3 mL 05/02/2022    COVID-19, PFIZER PURPLE top, DILUTE for use, (age 15 y+), 30mcg/0.3mL 02/23/2021, 03/16/2021, 10/07/2021    Hepatitis B Adult (Heplisav-b) 09/01/2022, 09/29/2022    Influenza Virus Vaccine 02/25/2016, 09/22/2019, 10/17/2020    Influenza, FLUARIX, FLULAVAL, Deborrah Block (age 10 mo+) AND AFLURIA, (age 1 y+), PF, 0.5mL 09/06/2016, 11/28/2017, 11/29/2018    Influenza, FLUCELVAX, (age 10 mo+), MDCK, PF, 0.5mL 12/04/2021, 09/14/2022    Pneumococcal Polysaccharide ([id number]) 02/25/2016    Pneumococcal conjugate PCV20, PF (Prevnar 20) 08/26/2022    Tdap (Boostrix, Adacel) 08/11/2009, 11/27/2019    Yellow Fever (YF-Vax) 07/01/2022    Zoster Recombinant (Shingrix) 08/18/2021, 12/04/2021        Influenza vaccine:  recommended every fall    Pneumonia vaccine: Prevnar 20 due at age 72    Tetanus vaccine:  tetanus and diptheria/Pertussis vaccine (Td/Tdap) recommended every 10 years- Tdap is due 2029    Shingles vaccine:  Shingrx is complete         Additional Recommendations   1. Use sunscreen daily to help reduce the risk of skin cancer  2. Continue working on lifestyle modification including a calorie restricted and portion control diet focusing on low-fat options as well as low carb options. 3. Update your eye exam every 2 years to screen for glaucoma, cataracts, and macular degeneration  4.  Always wear a seatbelt while riding in a car        Here are a few [employer] websites with a variety of health and wellness information:   www.mylifecheck. heart. org     www.nutritionsource. org     www. americanheart. org     www. diabetes. org      www.menopause. org     www.[facility]     www.[employer] web[employer] limitedS[facility])        The 10-year ASCVD risk score (Alpesh RENNER, et al., 2019) is: 5.2%    Values used to calculate the score:      Age: 58 years      Sex: Female      Is Non- : Yes      Diabetic: No      Tobacco smoker: No      Systolic Blood Pressure: 929 mmHg      Is BP treated: Yes      HDL Cholesterol: 49 mg/dL      Total Cholesterol: 183 mg/dL

## 2023-03-15 LAB
25(OH)D3 SERPL-MCNC: 71.3 NG/ML
FOLATE SERPL-MCNC: 12.16 NG/ML (ref 4.78–24.2)
VIT B12 SERPL-MCNC: >2000 PG/ML (ref 211–911)

## 2023-03-23 ENCOUNTER — TELEPHONE (OUTPATIENT)
Dept: INTERNAL MEDICINE CLINIC | Age: 63
End: 2023-03-23

## 2023-03-23 NOTE — TELEPHONE ENCOUNTER
Patient is calling in went to ED yesterday. Patient was told that her chest cavity was inflamed, told to take Tylenol and Alive and follow up with PCP. Dr. Bobby Smart has a 2:30 tomorrow  Please advise for add on.

## 2023-03-27 ENCOUNTER — OFFICE VISIT (OUTPATIENT)
Dept: INTERNAL MEDICINE CLINIC | Age: 63
End: 2023-03-27
Payer: COMMERCIAL

## 2023-03-27 VITALS
BODY MASS INDEX: 35.7 KG/M2 | HEART RATE: 85 BPM | RESPIRATION RATE: 14 BRPM | OXYGEN SATURATION: 98 % | WEIGHT: 194 LBS | HEIGHT: 62 IN | DIASTOLIC BLOOD PRESSURE: 68 MMHG | SYSTOLIC BLOOD PRESSURE: 118 MMHG

## 2023-03-27 DIAGNOSIS — Z82.49 FAMILY HISTORY OF CORONARY ARTERY DISEASE: ICD-10-CM

## 2023-03-27 DIAGNOSIS — E66.01 CLASS 2 SEVERE OBESITY DUE TO EXCESS CALORIES WITH SERIOUS COMORBIDITY AND BODY MASS INDEX (BMI) OF 35.0 TO 35.9 IN ADULT (HCC): ICD-10-CM

## 2023-03-27 DIAGNOSIS — R07.89 ATYPICAL CHEST PAIN: Primary | ICD-10-CM

## 2023-03-27 DIAGNOSIS — I10 BENIGN ESSENTIAL HTN: ICD-10-CM

## 2023-03-27 PROCEDURE — 3078F DIAST BP <80 MM HG: CPT | Performed by: INTERNAL MEDICINE

## 2023-03-27 PROCEDURE — 3074F SYST BP LT 130 MM HG: CPT | Performed by: INTERNAL MEDICINE

## 2023-03-27 PROCEDURE — 99214 OFFICE O/P EST MOD 30 MIN: CPT | Performed by: INTERNAL MEDICINE

## 2023-03-27 NOTE — PROGRESS NOTES
defic anemia     OAB (overactive bladder) 1/30/2019    Obesity     GAYATHRI (obstructive sleep apnea) 2009    PVC (premature ventricular contraction) 11/27/2019    Unspecified sleep apnea     cpap    Venous insufficiency of both lower extremities 9/6/2016    Vitamin B12 deficiency     Vitamin D deficiency          MEDICATIONS:  Prior to Visit Medications    Medication Sig   furosemide (LASIX) 40 MG tablet TAKE 1 TABLET BY MOUTH DAILY   DULoxetine (CYMBALTA) 60 MG extended release capsule TAKE 1 CAPSULE BY MOUTH EVERY DAY   tiZANidine (ZANAFLEX) 4 MG tablet TAKE 1 TABLET BY MOUTH EVERY NIGHT   ondansetron (ZOFRAN-ODT) 4 MG disintegrating tablet Take 1 tablet by mouth 3 times daily as needed for Nausea or Vomiting   traMADol (ULTRAM) 50 MG tablet Take 50 mg by mouth in the morning and 50 mg in the evening. As needed. metFORMIN (GLUCOPHAGE) 500 MG tablet Take 500 mg by mouth daily (with breakfast)   Semaglutide, 1 MG/DOSE, (OZEMPIC, 1 MG/DOSE,) 2 MG/1.5ML SOPN Inject 1 mg into the skin once a week   valsartan (DIOVAN) 80 MG tablet TAKE 1 TABLET BY MOUTH DAILY   solifenacin (VESICARE) 10 MG tablet Take 10 mg by mouth daily   potassium chloride (KLOR-CON M) 20 MEQ extended release tablet TAKE 1 TABLET BY MOUTH DAILY if taking Lasix   hydrocortisone 2.5 % cream Apply to the eyebrows daily if needed for scaling. Use no more than 2 weeks at a time. Azelastine-Fluticasone (DYMISTA NA) by Nasal route daily. Multiple Vitamin (MULTIVITAMIN PO) Take  by mouth daily. Cholecalciferol (VITAMIN D) 2000 UNIT TABS Take  by mouth daily. mometasone (ASMANEX) 220 MCG/INH inhaler Inhale 1 puff into the lungs 2 times daily. montelukast (SINGULAIR) 10 MG TABS Take 1 Tab by mouth daily. Review of Systems - As per HPI      OBJECTIVE:  Vitals:    03/27/23 1140   BP: 118/68   Pulse: 85   Resp: 14   SpO2: 98%   Weight: 194 lb (88 kg)   Height: 5' 2\" (1.575 m)     Body mass index is 35.48 kg/m².      Wt Readings from Last 3

## 2023-04-06 ENCOUNTER — HOSPITAL ENCOUNTER (OUTPATIENT)
Dept: MAMMOGRAPHY | Age: 63
Discharge: HOME | End: 2023-04-06
Payer: COMMERCIAL

## 2023-04-06 VITALS — BODY MASS INDEX: 35.7 KG/M2 | WEIGHT: 194 LBS | HEIGHT: 62 IN

## 2023-04-06 DIAGNOSIS — Z12.31 ENCOUNTER FOR SCREENING MAMMOGRAM FOR MALIGNANT NEOPLASM OF BREAST: ICD-10-CM

## 2023-04-06 DIAGNOSIS — Z00.00 ANNUAL PHYSICAL EXAM: ICD-10-CM

## 2023-04-06 PROCEDURE — 77067 SCR MAMMO BI INCL CAD: CPT

## 2023-04-20 ENCOUNTER — TELEPHONE (OUTPATIENT)
Dept: INTERNAL MEDICINE CLINIC | Age: 63
End: 2023-04-20

## 2023-04-20 NOTE — TELEPHONE ENCOUNTER
Patient is calling in yesterday evening patient stated with abdomin pain sharp pain with inflammation as patient states. Around midnight pain had gotten worse. Patient stated that her abdomin has inflammation from the lower abdomin all the way up to breast.   Patient took Gas X this morning with no relieve   Has not been able to use the bathroom   Urination out put has been just a little bit less than normal.   Patient stated that she did not eat anything different.

## 2023-04-21 NOTE — TELEPHONE ENCOUNTER
Patient states her abdominal pain has  improved. She prefers to be scheduled with her PCP. She has requested a follow up appt and has been added on 5/2. She will call if her sxs worsen.

## 2023-04-24 ENCOUNTER — NURSE ONLY (OUTPATIENT)
Dept: INTERNAL MEDICINE CLINIC | Age: 63
End: 2023-04-24
Payer: COMMERCIAL

## 2023-04-24 DIAGNOSIS — E53.8 B12 DEFICIENCY: Primary | ICD-10-CM

## 2023-04-24 PROCEDURE — 96372 THER/PROPH/DIAG INJ SC/IM: CPT | Performed by: INTERNAL MEDICINE

## 2023-04-24 RX ORDER — CYANOCOBALAMIN 1000 UG/ML
1000 INJECTION, SOLUTION INTRAMUSCULAR; SUBCUTANEOUS ONCE
Status: COMPLETED | OUTPATIENT
Start: 2023-04-24 | End: 2023-04-24

## 2023-04-24 RX ADMIN — CYANOCOBALAMIN 1000 MCG: 1000 INJECTION, SOLUTION INTRAMUSCULAR; SUBCUTANEOUS at 09:49

## 2023-05-02 ENCOUNTER — TELEMEDICINE (OUTPATIENT)
Dept: INTERNAL MEDICINE CLINIC | Age: 63
End: 2023-05-02
Payer: COMMERCIAL

## 2023-05-02 DIAGNOSIS — U07.1 COVID-19 VIRUS INFECTION: ICD-10-CM

## 2023-05-02 DIAGNOSIS — R10.12 LEFT UPPER QUADRANT ABDOMINAL PAIN: Primary | ICD-10-CM

## 2023-05-02 DIAGNOSIS — R10.13 EPIGASTRIC PAIN: ICD-10-CM

## 2023-05-02 PROCEDURE — 99213 OFFICE O/P EST LOW 20 MIN: CPT | Performed by: INTERNAL MEDICINE

## 2023-05-02 NOTE — PROGRESS NOTES
2023    [facility]) Physicians  Internal Medicine  Patient Encounter  Ector Umaña D.O., [address] -- Audio/Visual (During DIEHB-53 public health emergency)      I discussed at this telephone/video visit is a nontraditional type of visit that we are conducting in lieu of an office visit to minimize patient risk during the coronavirus pandemic. I discussed with the patient that I would not be able to perform a full physical examination, but that in most other respects the visit would be beneficial to his/her continued medical care. He/She again gave verbal consent for us to conduct this type of visit. Chief Complaint   Patient presents with    Chest Pain     Left side chest pain under her breast          HPI:    Mica Sandoval (:  1960) has requested an audio/video evaluation for the following concern(s): Abd pain, COVID-19    58 y.o. female being evaluated via virtual video visit due to the coronavirus pandemic emergency and public health crisis and inability to see the patient face-to-face in the office. Patient is being seen because she had developed COVID this past Friday. She feels she is getting along fine and actually getting better. The reason for the appointment was abdominal discomfort. Patient had called on 2023 reporting that she had woken up at midnight with severe left upper abdominal or left lower rib cage pain. The pain then migrated to the epigastric region. Patient points to these locations. Patient describes the pain as sharp and stabbing. She had no associated nausea, vomiting, diarrhea. She had no fever, chills, sweats. Symptoms lasted for about 4 days and then resolved. Patient denies any associated constipation or diarrhea. She denies melena or hematochezia. Denies any genitourinary symptoms. She called the office and was given appointment for [phone] with another provider as I was away at a CME conference.   However that

## 2023-05-24 ENCOUNTER — NURSE ONLY (OUTPATIENT)
Dept: INTERNAL MEDICINE CLINIC | Age: 63
End: 2023-05-24
Payer: COMMERCIAL

## 2023-05-24 DIAGNOSIS — E53.8 B12 DEFICIENCY: Primary | ICD-10-CM

## 2023-05-24 PROCEDURE — 96372 THER/PROPH/DIAG INJ SC/IM: CPT | Performed by: INTERNAL MEDICINE

## 2023-05-24 RX ORDER — CYANOCOBALAMIN 1000 UG/ML
1000 INJECTION, SOLUTION INTRAMUSCULAR; SUBCUTANEOUS ONCE
Status: COMPLETED | OUTPATIENT
Start: 2023-05-24 | End: 2023-05-24

## 2023-05-24 RX ADMIN — CYANOCOBALAMIN 1000 MCG: 1000 INJECTION, SOLUTION INTRAMUSCULAR; SUBCUTANEOUS at 09:09

## 2023-05-28 DIAGNOSIS — I87.2 VENOUS INSUFFICIENCY OF BOTH LOWER EXTREMITIES: ICD-10-CM

## 2023-05-30 RX ORDER — FUROSEMIDE 40 MG/1
TABLET ORAL
Qty: 30 TABLET | Refills: 2 | Status: SHIPPED | OUTPATIENT
Start: 2023-05-30

## 2023-06-21 ENCOUNTER — NURSE ONLY (OUTPATIENT)
Dept: INTERNAL MEDICINE CLINIC | Age: 63
End: 2023-06-21
Payer: COMMERCIAL

## 2023-06-21 DIAGNOSIS — E53.8 B12 DEFICIENCY: Primary | ICD-10-CM

## 2023-06-21 PROCEDURE — 96372 THER/PROPH/DIAG INJ SC/IM: CPT | Performed by: INTERNAL MEDICINE

## 2023-06-21 RX ORDER — CYANOCOBALAMIN 1000 UG/ML
1000 INJECTION, SOLUTION INTRAMUSCULAR; SUBCUTANEOUS ONCE
Status: COMPLETED | OUTPATIENT
Start: 2023-06-21 | End: 2023-06-21

## 2023-06-21 RX ADMIN — CYANOCOBALAMIN 1000 MCG: 1000 INJECTION, SOLUTION INTRAMUSCULAR; SUBCUTANEOUS at 09:20

## 2023-09-14 ENCOUNTER — OFFICE VISIT (OUTPATIENT)
Dept: INTERNAL MEDICINE CLINIC | Age: 63
End: 2023-09-14
Payer: COMMERCIAL

## 2023-09-14 VITALS
HEIGHT: 62 IN | HEART RATE: 90 BPM | SYSTOLIC BLOOD PRESSURE: 116 MMHG | RESPIRATION RATE: 14 BRPM | WEIGHT: 206 LBS | OXYGEN SATURATION: 98 % | BODY MASS INDEX: 37.91 KG/M2 | DIASTOLIC BLOOD PRESSURE: 72 MMHG

## 2023-09-14 DIAGNOSIS — E53.8 B12 DEFICIENCY: ICD-10-CM

## 2023-09-14 DIAGNOSIS — I10 BENIGN ESSENTIAL HTN: ICD-10-CM

## 2023-09-14 DIAGNOSIS — R73.01 IMPAIRED FASTING GLUCOSE: ICD-10-CM

## 2023-09-14 DIAGNOSIS — J45.20 MILD INTERMITTENT ASTHMA WITHOUT COMPLICATION: ICD-10-CM

## 2023-09-14 DIAGNOSIS — G47.33 OSA (OBSTRUCTIVE SLEEP APNEA): ICD-10-CM

## 2023-09-14 DIAGNOSIS — E53.8 FOLATE DEFICIENCY: ICD-10-CM

## 2023-09-14 DIAGNOSIS — E66.01 CLASS 2 SEVERE OBESITY DUE TO EXCESS CALORIES WITH SERIOUS COMORBIDITY AND BODY MASS INDEX (BMI) OF 37.0 TO 37.9 IN ADULT (HCC): ICD-10-CM

## 2023-09-14 DIAGNOSIS — M79.7 FIBROMYALGIA: ICD-10-CM

## 2023-09-14 DIAGNOSIS — E55.9 VITAMIN D DEFICIENCY: ICD-10-CM

## 2023-09-14 DIAGNOSIS — I87.2 VENOUS INSUFFICIENCY OF BOTH LOWER EXTREMITIES: ICD-10-CM

## 2023-09-14 DIAGNOSIS — R53.82 CHRONIC FATIGUE: Primary | ICD-10-CM

## 2023-09-14 DIAGNOSIS — R53.82 CHRONIC FATIGUE: ICD-10-CM

## 2023-09-14 LAB
25(OH)D3 SERPL-MCNC: 55.2 NG/ML
ALBUMIN SERPL-MCNC: 4.1 G/DL (ref 3.4–5)
ALBUMIN/GLOB SERPL: 1.9 {RATIO} (ref 1.1–2.2)
ALP SERPL-CCNC: 87 U/L (ref 40–129)
ALT SERPL-CCNC: 30 U/L (ref 10–40)
ANION GAP SERPL CALCULATED.3IONS-SCNC: 8 MMOL/L (ref 3–16)
AST SERPL-CCNC: 27 U/L (ref 15–37)
BASOPHILS # BLD: 0 K/UL (ref 0–0.2)
BASOPHILS NFR BLD: 0.8 %
BILIRUB SERPL-MCNC: 0.4 MG/DL (ref 0–1)
BUN SERPL-MCNC: 12 MG/DL (ref 7–20)
CALCIUM SERPL-MCNC: 10 MG/DL (ref 8.3–10.6)
CHLORIDE SERPL-SCNC: 106 MMOL/L (ref 99–110)
CHOLEST SERPL-MCNC: 187 MG/DL (ref 0–199)
CO2 SERPL-SCNC: 28 MMOL/L (ref 21–32)
CREAT SERPL-MCNC: 0.7 MG/DL (ref 0.6–1.2)
DEPRECATED RDW RBC AUTO: 13.8 % (ref 12.4–15.4)
EOSINOPHIL # BLD: 0.1 K/UL (ref 0–0.6)
EOSINOPHIL NFR BLD: 1.5 %
FOLATE SERPL-MCNC: 15.64 NG/ML (ref 4.78–24.2)
GFR SERPLBLD CREATININE-BSD FMLA CKD-EPI: >60 ML/MIN/{1.73_M2}
GLUCOSE SERPL-MCNC: 86 MG/DL (ref 70–99)
HCT VFR BLD AUTO: 36.5 % (ref 36–48)
HDLC SERPL-MCNC: 68 MG/DL (ref 40–60)
HGB BLD-MCNC: 12.2 G/DL (ref 12–16)
LDLC SERPL CALC-MCNC: 103 MG/DL
LYMPHOCYTES # BLD: 1 K/UL (ref 1–5.1)
LYMPHOCYTES NFR BLD: 20.3 %
MCH RBC QN AUTO: 30.4 PG (ref 26–34)
MCHC RBC AUTO-ENTMCNC: 33.4 G/DL (ref 31–36)
MCV RBC AUTO: 91 FL (ref 80–100)
MONOCYTES # BLD: 0.4 K/UL (ref 0–1.3)
MONOCYTES NFR BLD: 8.8 %
NEUTROPHILS # BLD: 3.2 K/UL (ref 1.7–7.7)
NEUTROPHILS NFR BLD: 68.6 %
PLATELET # BLD AUTO: 290 K/UL (ref 135–450)
PMV BLD AUTO: 8.7 FL (ref 5–10.5)
POTASSIUM SERPL-SCNC: 4 MMOL/L (ref 3.5–5.1)
PROT SERPL-MCNC: 6.3 G/DL (ref 6.4–8.2)
RBC # BLD AUTO: 4.01 M/UL (ref 4–5.2)
SODIUM SERPL-SCNC: 142 MMOL/L (ref 136–145)
TRIGL SERPL-MCNC: 80 MG/DL (ref 0–150)
TSH SERPL DL<=0.005 MIU/L-ACNC: 1.08 UIU/ML (ref 0.27–4.2)
VIT B12 SERPL-MCNC: >2000 PG/ML (ref 211–911)
VLDLC SERPL CALC-MCNC: 16 MG/DL
WBC # BLD AUTO: 4.7 K/UL (ref 4–11)

## 2023-09-14 PROCEDURE — 3078F DIAST BP <80 MM HG: CPT | Performed by: INTERNAL MEDICINE

## 2023-09-14 PROCEDURE — 3074F SYST BP LT 130 MM HG: CPT | Performed by: INTERNAL MEDICINE

## 2023-09-14 PROCEDURE — 96372 THER/PROPH/DIAG INJ SC/IM: CPT | Performed by: INTERNAL MEDICINE

## 2023-09-14 PROCEDURE — 90674 CCIIV4 VAC NO PRSV 0.5 ML IM: CPT | Performed by: INTERNAL MEDICINE

## 2023-09-14 PROCEDURE — 90471 IMMUNIZATION ADMIN: CPT | Performed by: INTERNAL MEDICINE

## 2023-09-14 PROCEDURE — 99214 OFFICE O/P EST MOD 30 MIN: CPT | Performed by: INTERNAL MEDICINE

## 2023-09-14 RX ORDER — CYANOCOBALAMIN 1000 UG/ML
1000 INJECTION, SOLUTION INTRAMUSCULAR; SUBCUTANEOUS ONCE
Status: COMPLETED | OUTPATIENT
Start: 2023-09-14 | End: 2023-09-14

## 2023-09-14 RX ORDER — SEMAGLUTIDE 2.68 MG/ML
2 INJECTION, SOLUTION SUBCUTANEOUS WEEKLY
Qty: 12 ML | Refills: 0 | Status: SHIPPED | OUTPATIENT
Start: 2023-09-14

## 2023-09-14 RX ADMIN — CYANOCOBALAMIN 1000 MCG: 1000 INJECTION, SOLUTION INTRAMUSCULAR; SUBCUTANEOUS at 08:29

## 2023-09-14 NOTE — PROGRESS NOTES
recognition software. Occasionally, words are mistranscribed and despite editing, the text may contain inaccuracies due to incorrect word recognition.   If further clarification is needed please contact the office at [phone]

## 2023-09-15 LAB
EST. AVERAGE GLUCOSE BLD GHB EST-MCNC: 114 MG/DL
HBA1C MFR BLD: 5.6 %

## 2023-09-19 DIAGNOSIS — I87.2 VENOUS INSUFFICIENCY OF BOTH LOWER EXTREMITIES: ICD-10-CM

## 2023-09-19 RX ORDER — FUROSEMIDE 40 MG/1
TABLET ORAL
Qty: 30 TABLET | Refills: 2 | Status: SHIPPED | OUTPATIENT
Start: 2023-09-19

## 2023-10-02 DIAGNOSIS — M50.30 DDD (DEGENERATIVE DISC DISEASE), CERVICAL: ICD-10-CM

## 2023-10-02 DIAGNOSIS — M79.7 FIBROMYALGIA: ICD-10-CM

## 2023-10-02 RX ORDER — TIZANIDINE 4 MG/1
TABLET ORAL
Qty: 90 TABLET | Refills: 1 | Status: SHIPPED | OUTPATIENT
Start: 2023-10-02

## 2023-10-13 DIAGNOSIS — I10 BENIGN ESSENTIAL HTN: ICD-10-CM

## 2023-10-13 RX ORDER — VALSARTAN 80 MG/1
80 TABLET ORAL DAILY
Qty: 90 TABLET | Refills: 0 | Status: SHIPPED | OUTPATIENT
Start: 2023-10-13

## 2023-10-20 ENCOUNTER — OFFICE VISIT (OUTPATIENT)
Dept: INTERNAL MEDICINE CLINIC | Age: 63
End: 2023-10-20
Payer: COMMERCIAL

## 2023-10-20 VITALS
SYSTOLIC BLOOD PRESSURE: 106 MMHG | HEART RATE: 72 BPM | DIASTOLIC BLOOD PRESSURE: 70 MMHG | BODY MASS INDEX: 37.17 KG/M2 | WEIGHT: 203.2 LBS | OXYGEN SATURATION: 98 %

## 2023-10-20 DIAGNOSIS — E66.01 CLASS 2 SEVERE OBESITY DUE TO EXCESS CALORIES WITH SERIOUS COMORBIDITY AND BODY MASS INDEX (BMI) OF 37.0 TO 37.9 IN ADULT (HCC): Primary | ICD-10-CM

## 2023-10-20 DIAGNOSIS — E53.8 B12 DEFICIENCY: ICD-10-CM

## 2023-10-20 DIAGNOSIS — M79.7 FIBROMYALGIA: ICD-10-CM

## 2023-10-20 PROCEDURE — 3074F SYST BP LT 130 MM HG: CPT | Performed by: INTERNAL MEDICINE

## 2023-10-20 PROCEDURE — 3078F DIAST BP <80 MM HG: CPT | Performed by: INTERNAL MEDICINE

## 2023-10-20 PROCEDURE — 99213 OFFICE O/P EST LOW 20 MIN: CPT | Performed by: INTERNAL MEDICINE

## 2023-10-20 PROCEDURE — 96372 THER/PROPH/DIAG INJ SC/IM: CPT | Performed by: INTERNAL MEDICINE

## 2023-10-20 RX ORDER — CYANOCOBALAMIN 1000 UG/ML
1000 INJECTION, SOLUTION INTRAMUSCULAR; SUBCUTANEOUS ONCE
Status: COMPLETED | OUTPATIENT
Start: 2023-10-20 | End: 2023-10-20

## 2023-10-20 RX ADMIN — CYANOCOBALAMIN 1000 MCG: 1000 INJECTION, SOLUTION INTRAMUSCULAR; SUBCUTANEOUS at 08:18

## 2023-10-20 NOTE — PROGRESS NOTES
[facility]) Physicians  Internal Medicine  Patient Encounter  Shanna Cota D.O., [facility]        Chief Complaint   Patient presents with    Follow-up       HPI: 58 y.o. female seen today for a short interval follow-up following her visit on 9/14/2023. At that time she was seen for her routine checkup. Today she is seen for ongoing weight management. Patient had been on Ozempic for over a year and had unsustained improvements. Her weight was trending up. We talked about increasing her semaglutide to 2 mg weekly. Her A1c back in March 2022 was 6.1%. We reviewed her lab from 9/14/2023:  Her A1c was 5.6%. CBC showed no evidence of anemia. B12 and folate levels are normal.  TSH was 1.08  LDL was 103    She is tolerating the 2 mg weekly. She states she has not been exercising. Patient states she had a \"fibromyalgia flare. \"  She was on steroids by Dr. Alberto Concepcion-- oral and NED x 2. Injections were 1 week ago and oral Medrol Dose amari was 2 weeks ago. She has reduced her screen time at night. She is sleeping much better. She feels much better. She has cut back on sweets, junk food from the vending machine. Her cravings are less. She has early satiety. Her pain level is down. She was given Gabapentin. She has never tried Gabapentin. Patient states she has not yet started the gabapentin. Cymbalta added in the past has helped overall.     Past Medical History:   Diagnosis Date    Allergic rhinitis     Anxiety     Asthma     COVID-19 virus infection 5/2/2023    Depression     Fibromyalgia 8/26/2022    Gastritis     GERD (gastroesophageal reflux disease)     Hearing disorder, sensorineural 1/21/2015    Hiatal hernia     Impaired fasting glucose 2/12/2021    Iron defic anemia     OAB (overactive bladder) 1/30/2019    Obesity     GAYATHRI (obstructive sleep apnea) 2009    PVC (premature ventricular contraction) 11/27/2019    Unspecified sleep apnea     cpap    Venous insufficiency of both lower extremities

## 2023-12-27 ENCOUNTER — NURSE ONLY (OUTPATIENT)
Dept: INTERNAL MEDICINE CLINIC | Age: 63
End: 2023-12-27
Payer: COMMERCIAL

## 2023-12-27 DIAGNOSIS — E53.8 B12 DEFICIENCY: Primary | ICD-10-CM

## 2023-12-27 PROCEDURE — 96372 THER/PROPH/DIAG INJ SC/IM: CPT | Performed by: INTERNAL MEDICINE

## 2023-12-27 RX ORDER — CYANOCOBALAMIN 1000 UG/ML
1000 INJECTION, SOLUTION INTRAMUSCULAR; SUBCUTANEOUS ONCE
Status: COMPLETED | OUTPATIENT
Start: 2023-12-27 | End: 2023-12-27

## 2023-12-27 RX ADMIN — CYANOCOBALAMIN 1000 MCG: 1000 INJECTION, SOLUTION INTRAMUSCULAR; SUBCUTANEOUS at 09:09

## 2023-12-29 DIAGNOSIS — I87.2 VENOUS INSUFFICIENCY OF BOTH LOWER EXTREMITIES: ICD-10-CM

## 2023-12-29 RX ORDER — FUROSEMIDE 40 MG/1
TABLET ORAL
Qty: 30 TABLET | Refills: 2 | Status: SHIPPED | OUTPATIENT
Start: 2023-12-29

## 2024-01-12 DIAGNOSIS — I10 BENIGN ESSENTIAL HTN: ICD-10-CM

## 2024-01-12 RX ORDER — VALSARTAN 80 MG/1
80 TABLET ORAL DAILY
Qty: 90 TABLET | Refills: 0 | Status: SHIPPED | OUTPATIENT
Start: 2024-01-12

## 2024-01-23 RX ORDER — DULOXETIN HYDROCHLORIDE 60 MG/1
CAPSULE, DELAYED RELEASE ORAL
Qty: 90 CAPSULE | Refills: 1 | Status: SHIPPED | OUTPATIENT
Start: 2024-01-23

## 2024-02-11 DIAGNOSIS — E66.01 CLASS 2 SEVERE OBESITY DUE TO EXCESS CALORIES WITH SERIOUS COMORBIDITY AND BODY MASS INDEX (BMI) OF 37.0 TO 37.9 IN ADULT (HCC): ICD-10-CM

## 2024-02-11 DIAGNOSIS — R73.01 IMPAIRED FASTING GLUCOSE: ICD-10-CM

## 2024-02-12 RX ORDER — SEMAGLUTIDE 2.68 MG/ML
INJECTION, SOLUTION SUBCUTANEOUS
Qty: 12 ML | Refills: 1 | Status: SHIPPED | OUTPATIENT
Start: 2024-02-12

## 2024-02-12 NOTE — TELEPHONE ENCOUNTER
Last appointment: 10/20/2023  Next appointment: Visit date not found  Last refill: 9/14/2023  Sent [employer] message to schedule due/overdue appointment.

## 2024-04-07 DIAGNOSIS — I87.2 VENOUS INSUFFICIENCY OF BOTH LOWER EXTREMITIES: ICD-10-CM

## 2024-04-08 RX ORDER — FUROSEMIDE 40 MG/1
TABLET ORAL
Qty: 30 TABLET | Refills: 3 | Status: SHIPPED | OUTPATIENT
Start: 2024-04-08

## 2024-04-11 DIAGNOSIS — I10 BENIGN ESSENTIAL HTN: ICD-10-CM

## 2024-04-11 RX ORDER — VALSARTAN 80 MG/1
80 TABLET ORAL DAILY
Qty: 90 TABLET | Refills: 1 | Status: SHIPPED | OUTPATIENT
Start: 2024-04-11

## 2024-04-15 ENCOUNTER — TELEPHONE (OUTPATIENT)
Dept: INTERNAL MEDICINE CLINIC | Age: 64
End: 2024-04-15

## 2024-04-15 DIAGNOSIS — E53.8 B12 DEFICIENCY: Primary | ICD-10-CM

## 2024-04-15 RX ORDER — CYANOCOBALAMIN 1000 UG/ML
1000 INJECTION, SOLUTION INTRAMUSCULAR; SUBCUTANEOUS
Qty: 3 ML | Refills: 3 | Status: SHIPPED | OUTPATIENT
Start: 2024-04-15

## 2024-04-15 NOTE — TELEPHONE ENCOUNTER
Please confirm patient is still to be getting b-12 injection, as last labs have elevated b-12 level. If ok, review pended prescription for accuracy

## 2024-04-15 NOTE — TELEPHONE ENCOUNTER
Patient is calling in for  in regards to wanting to get the b12 shot closer to home and would like to get it done at [facility]. Per patient she states she was told she needs a script to be sent to be able to get it.     Please send rx request to :    [facility]/pharmacy #[id number] - [place], [place] - [address] - P [phone] - F [phone]

## 2024-04-28 ASSESSMENT — PATIENT HEALTH QUESTIONNAIRE - PHQ9
4. FEELING TIRED OR HAVING LITTLE ENERGY: NEARLY EVERY DAY
6. FEELING BAD ABOUT YOURSELF - OR THAT YOU ARE A FAILURE OR HAVE LET YOURSELF OR YOUR FAMILY DOWN: SEVERAL DAYS
9. THOUGHTS THAT YOU WOULD BE BETTER OFF DEAD, OR OF HURTING YOURSELF: NOT AT ALL
2. FEELING DOWN, DEPRESSED OR HOPELESS: MORE THAN HALF THE DAYS
4. FEELING TIRED OR HAVING LITTLE ENERGY: NEARLY EVERY DAY
7. TROUBLE CONCENTRATING ON THINGS, SUCH AS READING THE NEWSPAPER OR WATCHING TELEVISION: SEVERAL DAYS
3. TROUBLE FALLING OR STAYING ASLEEP: NEARLY EVERY DAY
SUM OF ALL RESPONSES TO PHQ QUESTIONS 1-9: 16
6. FEELING BAD ABOUT YOURSELF - OR THAT YOU ARE A FAILURE OR HAVE LET YOURSELF OR YOUR FAMILY DOWN: SEVERAL DAYS
10. IF YOU CHECKED OFF ANY PROBLEMS, HOW DIFFICULT HAVE THESE PROBLEMS MADE IT FOR YOU TO DO YOUR WORK, TAKE CARE OF THINGS AT HOME, OR GET ALONG WITH OTHER PEOPLE: SOMEWHAT DIFFICULT
SUM OF ALL RESPONSES TO PHQ QUESTIONS 1-9: 16
2. FEELING DOWN, DEPRESSED OR HOPELESS: MORE THAN HALF THE DAYS
1. LITTLE INTEREST OR PLEASURE IN DOING THINGS: MORE THAN HALF THE DAYS
1. LITTLE INTEREST OR PLEASURE IN DOING THINGS: MORE THAN HALF THE DAYS
8. MOVING OR SPEAKING SO SLOWLY THAT OTHER PEOPLE COULD HAVE NOTICED. OR THE OPPOSITE - BEING SO FIDGETY OR RESTLESS THAT YOU HAVE BEEN MOVING AROUND A LOT MORE THAN USUAL: SEVERAL DAYS
5. POOR APPETITE OR OVEREATING: NEARLY EVERY DAY
10. IF YOU CHECKED OFF ANY PROBLEMS, HOW DIFFICULT HAVE THESE PROBLEMS MADE IT FOR YOU TO DO YOUR WORK, TAKE CARE OF THINGS AT HOME, OR GET ALONG WITH OTHER PEOPLE: SOMEWHAT DIFFICULT
3. TROUBLE FALLING OR STAYING ASLEEP: NEARLY EVERY DAY
9. THOUGHTS THAT YOU WOULD BE BETTER OFF DEAD, OR OF HURTING YOURSELF: NOT AT ALL
5. POOR APPETITE OR OVEREATING: NEARLY EVERY DAY
7. TROUBLE CONCENTRATING ON THINGS, SUCH AS READING THE NEWSPAPER OR WATCHING TELEVISION: SEVERAL DAYS
8. MOVING OR SPEAKING SO SLOWLY THAT OTHER PEOPLE COULD HAVE NOTICED. OR THE OPPOSITE, BEING SO FIGETY OR RESTLESS THAT YOU HAVE BEEN MOVING AROUND A LOT MORE THAN USUAL: SEVERAL DAYS
SUM OF ALL RESPONSES TO PHQ QUESTIONS 1-9: 16
SUM OF ALL RESPONSES TO PHQ9 QUESTIONS 1 & 2: 4
SUM OF ALL RESPONSES TO PHQ QUESTIONS 1-9: 16
SUM OF ALL RESPONSES TO PHQ QUESTIONS 1-9: 16
SUM OF ALL RESPONSES TO PHQ9 QUESTIONS 1 & 2: 4

## 2024-04-30 ENCOUNTER — OFFICE VISIT (OUTPATIENT)
Dept: INTERNAL MEDICINE CLINIC | Age: 64
End: 2024-04-30
Payer: COMMERCIAL

## 2024-04-30 ENCOUNTER — TELEPHONE (OUTPATIENT)
Dept: INTERNAL MEDICINE CLINIC | Age: 64
End: 2024-04-30

## 2024-04-30 VITALS
DIASTOLIC BLOOD PRESSURE: 72 MMHG | SYSTOLIC BLOOD PRESSURE: 128 MMHG | OXYGEN SATURATION: 99 % | HEART RATE: 66 BPM | TEMPERATURE: 97 F | BODY MASS INDEX: 38.01 KG/M2 | WEIGHT: 207.8 LBS

## 2024-04-30 DIAGNOSIS — Z13.220 SCREENING FOR HYPERLIPIDEMIA: ICD-10-CM

## 2024-04-30 DIAGNOSIS — E55.9 VITAMIN D DEFICIENCY: ICD-10-CM

## 2024-04-30 DIAGNOSIS — I10 BENIGN ESSENTIAL HTN: ICD-10-CM

## 2024-04-30 DIAGNOSIS — Z00.00 ANNUAL PHYSICAL EXAM: ICD-10-CM

## 2024-04-30 DIAGNOSIS — Z00.00 ANNUAL PHYSICAL EXAM: Primary | ICD-10-CM

## 2024-04-30 DIAGNOSIS — E53.8 FOLATE DEFICIENCY: ICD-10-CM

## 2024-04-30 DIAGNOSIS — E53.8 B12 DEFICIENCY: ICD-10-CM

## 2024-04-30 DIAGNOSIS — E66.01 CLASS 2 SEVERE OBESITY DUE TO EXCESS CALORIES WITH SERIOUS COMORBIDITY AND BODY MASS INDEX (BMI) OF 38.0 TO 38.9 IN ADULT (HCC): ICD-10-CM

## 2024-04-30 DIAGNOSIS — M79.7 FIBROMYALGIA: ICD-10-CM

## 2024-04-30 DIAGNOSIS — Z12.31 ENCOUNTER FOR SCREENING MAMMOGRAM FOR MALIGNANT NEOPLASM OF BREAST: ICD-10-CM

## 2024-04-30 DIAGNOSIS — F43.21 ADJUSTMENT DISORDER WITH DEPRESSED MOOD: ICD-10-CM

## 2024-04-30 DIAGNOSIS — R73.01 IMPAIRED FASTING GLUCOSE: ICD-10-CM

## 2024-04-30 DIAGNOSIS — F43.21 GRIEF REACTION: ICD-10-CM

## 2024-04-30 LAB
BASOPHILS # BLD: 0.1 K/UL (ref 0–0.2)
BASOPHILS NFR BLD: 1.1 %
DEPRECATED RDW RBC AUTO: 14.4 % (ref 12.4–15.4)
EOSINOPHIL # BLD: 0.1 K/UL (ref 0–0.6)
EOSINOPHIL NFR BLD: 2 %
HCT VFR BLD AUTO: 39.2 % (ref 36–48)
HGB BLD-MCNC: 12.6 G/DL (ref 12–16)
LYMPHOCYTES # BLD: 1.6 K/UL (ref 1–5.1)
LYMPHOCYTES NFR BLD: 35.3 %
MCH RBC QN AUTO: 29.7 PG (ref 26–34)
MCHC RBC AUTO-ENTMCNC: 32.2 G/DL (ref 31–36)
MCV RBC AUTO: 92.2 FL (ref 80–100)
MONOCYTES # BLD: 0.5 K/UL (ref 0–1.3)
MONOCYTES NFR BLD: 10.5 %
NEUTROPHILS # BLD: 2.3 K/UL (ref 1.7–7.7)
NEUTROPHILS NFR BLD: 51.1 %
PLATELET # BLD AUTO: 342 K/UL (ref 135–450)
PMV BLD AUTO: 8.8 FL (ref 5–10.5)
RBC # BLD AUTO: 4.24 M/UL (ref 4–5.2)
WBC # BLD AUTO: 4.6 K/UL (ref 4–11)

## 2024-04-30 PROCEDURE — 3074F SYST BP LT 130 MM HG: CPT | Performed by: INTERNAL MEDICINE

## 2024-04-30 PROCEDURE — 99396 PREV VISIT EST AGE 40-64: CPT | Performed by: INTERNAL MEDICINE

## 2024-04-30 PROCEDURE — 3078F DIAST BP <80 MM HG: CPT | Performed by: INTERNAL MEDICINE

## 2024-04-30 RX ORDER — BUPROPION HYDROCHLORIDE 150 MG/1
150 TABLET ORAL EVERY MORNING
Qty: 90 TABLET | Refills: 0 | Status: SHIPPED | OUTPATIENT
Start: 2024-04-30

## 2024-04-30 RX ORDER — SYRINGE W-NEEDLE,DISPOSAB,3 ML 25GX5/8"
3 SYRINGE, EMPTY DISPOSABLE MISCELLANEOUS
Qty: 3 EACH | Refills: 3 | Status: SHIPPED | OUTPATIENT
Start: 2024-04-30

## 2024-04-30 RX ORDER — SEMAGLUTIDE 0.68 MG/ML
0.5 INJECTION, SOLUTION SUBCUTANEOUS WEEKLY
Qty: 3 ML | Refills: 0 | Status: SHIPPED | OUTPATIENT
Start: 2024-04-30

## 2024-04-30 SDOH — ECONOMIC STABILITY: FOOD INSECURITY: WITHIN THE PAST 12 MONTHS, THE FOOD YOU BOUGHT JUST DIDN'T LAST AND YOU DIDN'T HAVE MONEY TO GET MORE.: NEVER TRUE

## 2024-04-30 SDOH — ECONOMIC STABILITY: FOOD INSECURITY: WITHIN THE PAST 12 MONTHS, YOU WORRIED THAT YOUR FOOD WOULD RUN OUT BEFORE YOU GOT MONEY TO BUY MORE.: NEVER TRUE

## 2024-04-30 SDOH — ECONOMIC STABILITY: INCOME INSECURITY: HOW HARD IS IT FOR YOU TO PAY FOR THE VERY BASICS LIKE FOOD, HOUSING, MEDICAL CARE, AND HEATING?: NOT HARD AT ALL

## 2024-04-30 ASSESSMENT — ANXIETY QUESTIONNAIRES
2. NOT BEING ABLE TO STOP OR CONTROL WORRYING: SEVERAL DAYS
IF YOU CHECKED OFF ANY PROBLEMS ON THIS QUESTIONNAIRE, HOW DIFFICULT HAVE THESE PROBLEMS MADE IT FOR YOU TO DO YOUR WORK, TAKE CARE OF THINGS AT HOME, OR GET ALONG WITH OTHER PEOPLE: SOMEWHAT DIFFICULT
4. TROUBLE RELAXING: SEVERAL DAYS
5. BEING SO RESTLESS THAT IT IS HARD TO SIT STILL: NOT AT ALL
3. WORRYING TOO MUCH ABOUT DIFFERENT THINGS: NOT AT ALL
7. FEELING AFRAID AS IF SOMETHING AWFUL MIGHT HAPPEN: NOT AT ALL
6. BECOMING EASILY ANNOYED OR IRRITABLE: NOT AT ALL
GAD7 TOTAL SCORE: 3
1. FEELING NERVOUS, ANXIOUS, OR ON EDGE: SEVERAL DAYS

## 2024-04-30 ASSESSMENT — PATIENT HEALTH QUESTIONNAIRE - PHQ9
SUM OF ALL RESPONSES TO PHQ QUESTIONS 1-9: 9
SUM OF ALL RESPONSES TO PHQ QUESTIONS 1-9: 9
SUM OF ALL RESPONSES TO PHQ9 QUESTIONS 1 & 2: 3
2. FEELING DOWN, DEPRESSED OR HOPELESS: SEVERAL DAYS
6. FEELING BAD ABOUT YOURSELF - OR THAT YOU ARE A FAILURE OR HAVE LET YOURSELF OR YOUR FAMILY DOWN: NOT AT ALL
10. IF YOU CHECKED OFF ANY PROBLEMS, HOW DIFFICULT HAVE THESE PROBLEMS MADE IT FOR YOU TO DO YOUR WORK, TAKE CARE OF THINGS AT HOME, OR GET ALONG WITH OTHER PEOPLE: SOMEWHAT DIFFICULT
SUM OF ALL RESPONSES TO PHQ QUESTIONS 1-9: 9
5. POOR APPETITE OR OVEREATING: SEVERAL DAYS
SUM OF ALL RESPONSES TO PHQ QUESTIONS 1-9: 9
3. TROUBLE FALLING OR STAYING ASLEEP: SEVERAL DAYS
7. TROUBLE CONCENTRATING ON THINGS, SUCH AS READING THE NEWSPAPER OR WATCHING TELEVISION: MORE THAN HALF THE DAYS
4. FEELING TIRED OR HAVING LITTLE ENERGY: SEVERAL DAYS
8. MOVING OR SPEAKING SO SLOWLY THAT OTHER PEOPLE COULD HAVE NOTICED. OR THE OPPOSITE, BEING SO FIGETY OR RESTLESS THAT YOU HAVE BEEN MOVING AROUND A LOT MORE THAN USUAL: SEVERAL DAYS
9. THOUGHTS THAT YOU WOULD BE BETTER OFF DEAD, OR OF HURTING YOURSELF: NOT AT ALL
1. LITTLE INTEREST OR PLEASURE IN DOING THINGS: MORE THAN HALF THE DAYS

## 2024-04-30 NOTE — TELEPHONE ENCOUNTER
Patient has been off 2 mg for several months.  Patient can go back and start at the 0.5 mg.  She does not need to go back to the 0.25 mg.

## 2024-04-30 NOTE — PATIENT INSTRUCTIONS
Preventive plan of care for Alysia Braxton        4/30/2024           Preventive Measures Status       Recommendations for screening   Colon Cancer Screen   Last colonoscopy: 01/18/2016, Dr. Taylor Colonoscopy due every 10 years--2026    Breast Cancer Screen  Last mammogram: 2/19/2022 Repeat yearly.    Cervical Cancer Screen   Last PAP smear: 3/2022 Dr. Ashley.   Repeat every 3 years or as advised by gynecologist.  You still require a pelvic, rectal, and breast exam yearly.    Osteoporosis Screen   Last DXA scan: None Test is due at age 65.     Diabetes Screen  Glucose (mg/dL)   Date Value   09/14/2023 86    Test recommended and ordered.     Cholesterol Screen   Lab Results   Component Value Date    CHOL 187 09/14/2023    TRIG 80 09/14/2023    HDL 68 (H) 09/14/2023    LDLCALC 103 (H) 09/14/2023    Test recommended and ordered   HIV screening recommended for those ages 15-65 NO MATTER THE RISK FOR HIV--  9/9/2016  No need to repeat at this time    Hepatitis C screening recommended for those between the ages of 18 and 79--9/9/2016 No need to repeat at this time    Aspirin for Cardiovascular Prevention   No Aspirin not indicated at this time based on current guidelines    Recommended Immunizations    Immunization History   Administered Date(s) Administered    COVID-19, PFIZER Bivalent, DO NOT Dilute, (age 12y+), IM, 30 mcg/0.3 mL 09/12/2022    COVID-19, PFIZER GRAY top, DO NOT Dilute, (age 12 y+), IM, 30 mcg/0.3 mL 05/02/2022    COVID-19, PFIZER PURPLE top, DILUTE for use, (age 12 y+), 30mcg/0.3mL 02/23/2021, 03/16/2021, 10/07/2021    COVID-19, PFIZER, (2023-24 formula), (age 12y+), IM, 30mcg/0.3mL 10/15/2023    Hep B, HEPLISAV-B, (age 18y+), IM, 0.5mL 09/01/2022, 09/29/2022    Influenza Virus Vaccine 02/25/2016, 09/22/2019, 10/17/2020    Influenza, FLUARIX, FLULAVAL, FLUZONE (age 6 mo+) AND AFLURIA, (age 3 y+), PF, 0.5mL 09/06/2016, 11/28/2017, 11/29/2018    Influenza, FLUCELVAX, (age 6 mo+), MDCK, PF, 0.5mL 12/04/2021,

## 2024-04-30 NOTE — TELEPHONE ENCOUNTER
Called pharmacy and relayed that according to provider ov note from today, today, pt would like to restart the ozempic, and was made aware that she would need to restart and titrate back up to most recent dose.     Per pharmacist, pt did fill a 3 month supply of the 2mg dose. Would like to clarify that pt had indeed stopped this dose prior.     Please advise.   Thanks

## 2024-04-30 NOTE — PROGRESS NOTES
Additional Recommendations   1.  Use sunscreen daily to help reduce the risk of skin cancer  2.  Continue working on lifestyle modification including a calorie restricted and portion control diet focusing on low-fat options as well as low carb options.  3. Update your eye exam every 2 years to screen for glaucoma, cataracts, and macular degeneration  4. Always wear a seatbelt while riding in a car        Here are a few  Reliable websites with a variety of health and wellness information:   www.mylifecheck.heart.org     www.nutritionsource.org     www.americanheart.org     www.diabetes.org      www.menopause.org     www.[facility]     www.[email or web address].com ([facility] Wellness site)

## 2024-04-30 NOTE — TELEPHONE ENCOUNTER
Pharmacist called to clarify the Ozempic Rx below. She normally takes the 2 mg but today 0.5 mg was sent. Is she starting over or is this the new dose that she's is on?            Semaglutide,0.25 or 0.5MG/DOS, (OZEMPIC, 0.25 OR 0.5 MG/DOSE,) 2 MG/3ML SOPN [[id number]]    Order Details  Dose: 0.5 mg Route: SubCUTAneous Frequency: WEEKLY   Dispense Quantity: 3 mL Refills: 0          Sig: Inject 0.5 mg into the skin once a week         Start Date: 04/30/24 End Date: --   Written Date: 04/30/24 Expiration Date: 04/30/25       Associated Diagnoses: Class 2 severe obesity due to excess calories with serious comorbidity and body mass index (BMI) of 38.0 to 38.9 in adult (HCC) [E66.01, Z68.38]; Impaired fasting glucose [R73.01]   Providers    Authorizing Provider: River Figueroa DO NPI: [id number]   Ordering User: River Figueroa DO          Pharmacy    [id number] [place], [place] - [address] [facility] - P [phone] - F [phone] [address] [facility] [facility] [address]  Phone: [phone]  Fax: [phone]

## 2024-05-01 LAB
25(OH)D3 SERPL-MCNC: 65.2 NG/ML
ALBUMIN SERPL-MCNC: 4.3 G/DL (ref 3.4–5)
ALBUMIN/GLOB SERPL: 1.7 {RATIO} (ref 1.1–2.2)
ALP SERPL-CCNC: 112 U/L (ref 40–129)
ALT SERPL-CCNC: 31 U/L (ref 10–40)
ANION GAP SERPL CALCULATED.3IONS-SCNC: 12 MMOL/L (ref 3–16)
AST SERPL-CCNC: 29 U/L (ref 15–37)
BILIRUB SERPL-MCNC: 0.3 MG/DL (ref 0–1)
BUN SERPL-MCNC: 11 MG/DL (ref 7–20)
CALCIUM SERPL-MCNC: 10.1 MG/DL (ref 8.3–10.6)
CHLORIDE SERPL-SCNC: 101 MMOL/L (ref 99–110)
CHOLEST SERPL-MCNC: 189 MG/DL (ref 0–199)
CO2 SERPL-SCNC: 27 MMOL/L (ref 21–32)
CREAT SERPL-MCNC: 0.6 MG/DL (ref 0.6–1.2)
EST. AVERAGE GLUCOSE BLD GHB EST-MCNC: 111.2 MG/DL
FOLATE SERPL-MCNC: 8.5 NG/ML (ref 4.78–24.2)
GFR SERPLBLD CREATININE-BSD FMLA CKD-EPI: >90 ML/MIN/{1.73_M2}
GLUCOSE SERPL-MCNC: 90 MG/DL (ref 70–99)
HBA1C MFR BLD: 5.5 %
HDLC SERPL-MCNC: 60 MG/DL (ref 40–60)
LDLC SERPL CALC-MCNC: 114 MG/DL
POTASSIUM SERPL-SCNC: 4.3 MMOL/L (ref 3.5–5.1)
PROT SERPL-MCNC: 6.8 G/DL (ref 6.4–8.2)
SODIUM SERPL-SCNC: 140 MMOL/L (ref 136–145)
TRIGL SERPL-MCNC: 75 MG/DL (ref 0–150)
TSH SERPL DL<=0.005 MIU/L-ACNC: 1.69 UIU/ML (ref 0.27–4.2)
VIT B12 SERPL-MCNC: 293 PG/ML (ref 211–911)
VLDLC SERPL CALC-MCNC: 15 MG/DL

## 2024-05-10 ENCOUNTER — HOSPITAL ENCOUNTER (OUTPATIENT)
Dept: MAMMOGRAPHY | Age: 64
Discharge: HOME | End: 2024-05-10
Attending: INTERNAL MEDICINE
Payer: COMMERCIAL

## 2024-05-10 VITALS — HEIGHT: 62 IN | WEIGHT: 200 LBS | BODY MASS INDEX: 36.8 KG/M2

## 2024-05-10 DIAGNOSIS — Z12.31 ENCOUNTER FOR SCREENING MAMMOGRAM FOR MALIGNANT NEOPLASM OF BREAST: ICD-10-CM

## 2024-05-10 PROCEDURE — 77063 BREAST TOMOSYNTHESIS BI: CPT

## 2024-05-23 ENCOUNTER — OFFICE VISIT (OUTPATIENT)
Dept: INTERNAL MEDICINE CLINIC | Age: 64
End: 2024-05-23
Payer: COMMERCIAL

## 2024-05-23 VITALS
DIASTOLIC BLOOD PRESSURE: 64 MMHG | SYSTOLIC BLOOD PRESSURE: 110 MMHG | HEART RATE: 94 BPM | WEIGHT: 209 LBS | BODY MASS INDEX: 38.23 KG/M2 | OXYGEN SATURATION: 96 %

## 2024-05-23 DIAGNOSIS — F43.23 ADJUSTMENT DISORDER WITH MIXED ANXIETY AND DEPRESSED MOOD: Primary | ICD-10-CM

## 2024-05-23 DIAGNOSIS — I87.2 VENOUS INSUFFICIENCY OF BOTH LOWER EXTREMITIES: ICD-10-CM

## 2024-05-23 PROCEDURE — 3074F SYST BP LT 130 MM HG: CPT | Performed by: INTERNAL MEDICINE

## 2024-05-23 PROCEDURE — 3078F DIAST BP <80 MM HG: CPT | Performed by: INTERNAL MEDICINE

## 2024-05-23 PROCEDURE — 99213 OFFICE O/P EST LOW 20 MIN: CPT | Performed by: INTERNAL MEDICINE

## 2024-05-23 RX ORDER — POTASSIUM CHLORIDE 20 MEQ/1
TABLET, EXTENDED RELEASE ORAL
Qty: 90 TABLET | Refills: 0 | Status: SHIPPED | OUTPATIENT
Start: 2024-05-23

## 2024-05-23 ASSESSMENT — PATIENT HEALTH QUESTIONNAIRE - PHQ9
1. LITTLE INTEREST OR PLEASURE IN DOING THINGS: MORE THAN HALF THE DAYS
6. FEELING BAD ABOUT YOURSELF - OR THAT YOU ARE A FAILURE OR HAVE LET YOURSELF OR YOUR FAMILY DOWN: SEVERAL DAYS
SUM OF ALL RESPONSES TO PHQ QUESTIONS 1-9: 13
SUM OF ALL RESPONSES TO PHQ QUESTIONS 1-9: 13
8. MOVING OR SPEAKING SO SLOWLY THAT OTHER PEOPLE COULD HAVE NOTICED. OR THE OPPOSITE, BEING SO FIGETY OR RESTLESS THAT YOU HAVE BEEN MOVING AROUND A LOT MORE THAN USUAL: NOT AT ALL
10. IF YOU CHECKED OFF ANY PROBLEMS, HOW DIFFICULT HAVE THESE PROBLEMS MADE IT FOR YOU TO DO YOUR WORK, TAKE CARE OF THINGS AT HOME, OR GET ALONG WITH OTHER PEOPLE: SOMEWHAT DIFFICULT
7. TROUBLE CONCENTRATING ON THINGS, SUCH AS READING THE NEWSPAPER OR WATCHING TELEVISION: MORE THAN HALF THE DAYS
2. FEELING DOWN, DEPRESSED OR HOPELESS: MORE THAN HALF THE DAYS
SUM OF ALL RESPONSES TO PHQ QUESTIONS 1-9: 13
4. FEELING TIRED OR HAVING LITTLE ENERGY: NEARLY EVERY DAY
9. THOUGHTS THAT YOU WOULD BE BETTER OFF DEAD, OR OF HURTING YOURSELF: NOT AT ALL
5. POOR APPETITE OR OVEREATING: NOT AT ALL
3. TROUBLE FALLING OR STAYING ASLEEP: NEARLY EVERY DAY
SUM OF ALL RESPONSES TO PHQ9 QUESTIONS 1 & 2: 4
SUM OF ALL RESPONSES TO PHQ QUESTIONS 1-9: 13

## 2024-05-23 ASSESSMENT — ANXIETY QUESTIONNAIRES
7. FEELING AFRAID AS IF SOMETHING AWFUL MIGHT HAPPEN: NOT AT ALL
6. BECOMING EASILY ANNOYED OR IRRITABLE: MORE THAN HALF THE DAYS
1. FEELING NERVOUS, ANXIOUS, OR ON EDGE: NOT AT ALL
5. BEING SO RESTLESS THAT IT IS HARD TO SIT STILL: NOT AT ALL
GAD7 TOTAL SCORE: 6
IF YOU CHECKED OFF ANY PROBLEMS ON THIS QUESTIONNAIRE, HOW DIFFICULT HAVE THESE PROBLEMS MADE IT FOR YOU TO DO YOUR WORK, TAKE CARE OF THINGS AT HOME, OR GET ALONG WITH OTHER PEOPLE: SOMEWHAT DIFFICULT
4. TROUBLE RELAXING: NEARLY EVERY DAY
3. WORRYING TOO MUCH ABOUT DIFFERENT THINGS: SEVERAL DAYS
2. NOT BEING ABLE TO STOP OR CONTROL WORRYING: NOT AT ALL

## 2024-05-23 NOTE — PROGRESS NOTES
defic anemia     OAB (overactive bladder) 1/30/2019    Obesity     GAYATHRI (obstructive sleep apnea) 2009    PVC (premature ventricular contraction) 11/27/2019    Unspecified sleep apnea     cpap    Venous insufficiency of both lower extremities 9/6/2016    Vitamin B12 deficiency     Vitamin D deficiency          MEDICATIONS:  Prior to Visit Medications    Medication Sig   buPROPion (WELLBUTRIN XL) 150 MG extended release tablet Take 1 tablet by mouth every morning   Semaglutide,0.25 or 0.5MG/DOS, (OZEMPIC, 0.25 OR 0.5 MG/DOSE,) 2 MG/3ML SOPN Inject 0.5 mg into the skin once a week   cyanocobalamin 1000 MCG/ML injection Inject 1 mL into the muscle every 30 days   valsartan (DIOVAN) 80 MG tablet TAKE 1 TABLET BY MOUTH DAILY   furosemide (LASIX) 40 MG tablet TAKE 1 TABLET BY MOUTH DAILY   DULoxetine (CYMBALTA) 60 MG extended release capsule TAKE 1 CAPSULE BY MOUTH EVERY DAY   tiZANidine (ZANAFLEX) 4 MG tablet TAKE 1 TABLET BY MOUTH EVERY NIGHT   metFORMIN (GLUCOPHAGE) 500 MG tablet Take 1 tablet by mouth daily (with breakfast)   solifenacin (VESICARE) 10 MG tablet Take 1 tablet by mouth daily   potassium chloride (KLOR-CON M) 20 MEQ extended release tablet TAKE 1 TABLET BY MOUTH DAILY if taking Lasix   hydrocortisone 2.5 % cream Apply to the eyebrows daily if needed for scaling.  Use no more than 2 weeks at a time.   Azelastine-Fluticasone (DYMISTA NA) by Nasal route daily.   Multiple Vitamin (MULTIVITAMIN PO) Take  by mouth daily.     Cholecalciferol (VITAMIN D) 2000 UNIT TABS Take  by mouth daily.   mometasone (ASMANEX) 220 MCG/INH inhaler Inhale 1 puff into the lungs 2 times daily   montelukast (SINGULAIR) 10 MG TABS Take 1 tablet by mouth daily   Syringe/Needle, Disp, (SYRINGE 3CC/25GX1\") 25G X 1\" 3 ML MISC 3 Devices by Does not apply route every 30 days           Review of Systems - As per HPI      OBJECTIVE:  Vitals:    05/23/24 0812   BP: 110/64   Pulse: 94   SpO2: 96%   Weight: 94.8 kg (209 lb)     Body mass index

## 2024-06-06 ENCOUNTER — HOSPITAL ENCOUNTER (OUTPATIENT)
Dept: WOMENS IMAGING | Age: 64
Discharge: HOME | End: 2024-06-06
Payer: COMMERCIAL

## 2024-06-06 ENCOUNTER — TELEPHONE (OUTPATIENT)
Dept: WOMENS IMAGING | Age: 64
End: 2024-06-06

## 2024-06-06 ENCOUNTER — PRE-PROCEDURE TELEPHONE (OUTPATIENT)
Dept: WOMENS IMAGING | Age: 64
End: 2024-06-06

## 2024-06-06 ENCOUNTER — HOSPITAL ENCOUNTER (OUTPATIENT)
Dept: ULTRASOUND IMAGING | Age: 64
Discharge: HOME | End: 2024-06-06
Payer: COMMERCIAL

## 2024-06-06 DIAGNOSIS — R92.8 ABNORMAL MAMMOGRAM: ICD-10-CM

## 2024-06-06 DIAGNOSIS — R92.8 ABNORMAL MAMMOGRAM: Primary | ICD-10-CM

## 2024-06-06 PROCEDURE — G0279 TOMOSYNTHESIS, MAMMO: HCPCS

## 2024-06-06 PROCEDURE — 76642 ULTRASOUND BREAST LIMITED: CPT

## 2024-06-06 NOTE — TELEPHONE ENCOUNTER
Based on imaging done today please sign the following order. She is scheduled 6/18.  Thank you,  Dayton Fenton

## 2024-06-18 ENCOUNTER — HOSPITAL ENCOUNTER (OUTPATIENT)
Dept: WOMENS IMAGING | Age: 64
Discharge: HOME | End: 2024-06-18
Payer: COMMERCIAL

## 2024-06-18 DIAGNOSIS — R92.8 ABNORMAL MAMMOGRAM: ICD-10-CM

## 2024-06-18 PROCEDURE — 2500000003 HC RX 250 WO HCPCS: Performed by: RADIOLOGY

## 2024-06-18 PROCEDURE — 77065 DX MAMMO INCL CAD UNI: CPT

## 2024-06-18 PROCEDURE — 88305 TISSUE EXAM BY PATHOLOGIST: CPT

## 2024-06-18 PROCEDURE — 76098 X-RAY EXAM SURGICAL SPECIMEN: CPT

## 2024-06-18 PROCEDURE — 19081 BX BREAST 1ST LESION STRTCTC: CPT

## 2024-06-18 RX ORDER — LIDOCAINE HYDROCHLORIDE AND EPINEPHRINE 10; 10 MG/ML; UG/ML
20 INJECTION, SOLUTION INFILTRATION; PERINEURAL ONCE
Status: COMPLETED | OUTPATIENT
Start: 2024-06-18 | End: 2024-06-18

## 2024-06-18 RX ORDER — LIDOCAINE HYDROCHLORIDE 10 MG/ML
5 INJECTION, SOLUTION INFILTRATION; PERINEURAL ONCE
Status: COMPLETED | OUTPATIENT
Start: 2024-06-18 | End: 2024-06-18

## 2024-06-18 RX ADMIN — LIDOCAINE HYDROCHLORIDE,EPINEPHRINE BITARTRATE 20 ML: 10; .01 INJECTION, SOLUTION INFILTRATION; PERINEURAL at 11:36

## 2024-06-18 RX ADMIN — LIDOCAINE HYDROCHLORIDE ANHYDROUS 2 ML: 10 INJECTION, SOLUTION INFILTRATION at 11:35

## 2024-06-18 ASSESSMENT — PAIN SCALES - GENERAL
PAINLEVEL_OUTOF10: 0
PAINLEVEL_OUTOF10: 0

## 2024-06-18 NOTE — PROGRESS NOTES
Patient here for breast biopsy. NN reviewed the health history, allergies and medications. Pt's  drove her.  Radiologist reviews procedure with patient, consent signed. Patient tolerates procedure well. Compression held. Site cleansed with chloraprep, steri strips and dry dressing applied. Ice pack in place. Reviewed discharge instructions with patient and signed copy. Patient verbalized understanding and agreed to contact Nurse Navigator with any questions. Patient A&Ox3, steady on feet and discharged home.

## 2024-06-19 ENCOUNTER — TELEPHONE (OUTPATIENT)
Dept: WOMENS IMAGING | Age: 64
End: 2024-06-19

## 2024-06-19 NOTE — TELEPHONE ENCOUNTER
Nurse Navigator reviewed results of breast biopsy which showed breast tissue with fat necrosis and associated  lymphohistiocytic inflammation on the pathology report.  Negative for atypia or malignancy. NN reviewed radiologist follow up recommendations as left diagnostic mammogram in 6 months.   Pt verbalized understanding, all questions answered at this time.

## 2024-06-26 ENCOUNTER — OFFICE VISIT (OUTPATIENT)
Dept: INTERNAL MEDICINE CLINIC | Age: 64
End: 2024-06-26
Payer: COMMERCIAL

## 2024-06-26 VITALS
WEIGHT: 207.2 LBS | DIASTOLIC BLOOD PRESSURE: 70 MMHG | SYSTOLIC BLOOD PRESSURE: 96 MMHG | HEART RATE: 90 BPM | OXYGEN SATURATION: 98 % | BODY MASS INDEX: 37.9 KG/M2

## 2024-06-26 DIAGNOSIS — E66.01 CLASS 2 SEVERE OBESITY DUE TO EXCESS CALORIES WITH SERIOUS COMORBIDITY AND BODY MASS INDEX (BMI) OF 38.0 TO 38.9 IN ADULT (HCC): Primary | ICD-10-CM

## 2024-06-26 DIAGNOSIS — R73.01 IMPAIRED FASTING GLUCOSE: ICD-10-CM

## 2024-06-26 DIAGNOSIS — F43.21 ADJUSTMENT DISORDER WITH DEPRESSED MOOD: ICD-10-CM

## 2024-06-26 PROCEDURE — 3078F DIAST BP <80 MM HG: CPT | Performed by: INTERNAL MEDICINE

## 2024-06-26 PROCEDURE — 99214 OFFICE O/P EST MOD 30 MIN: CPT | Performed by: INTERNAL MEDICINE

## 2024-06-26 PROCEDURE — 3074F SYST BP LT 130 MM HG: CPT | Performed by: INTERNAL MEDICINE

## 2024-06-26 RX ORDER — SEMAGLUTIDE 1.34 MG/ML
1 INJECTION, SOLUTION SUBCUTANEOUS
Qty: 3 ML | Refills: 0 | Status: SHIPPED | OUTPATIENT
Start: 2024-06-26

## 2024-06-26 RX ORDER — BUPROPION HYDROCHLORIDE 150 MG/1
300 TABLET ORAL EVERY MORNING
Qty: 90 TABLET | Refills: 0 | Status: SHIPPED | OUTPATIENT
Start: 2024-06-26

## 2024-06-26 ASSESSMENT — ANXIETY QUESTIONNAIRES
3. WORRYING TOO MUCH ABOUT DIFFERENT THINGS: SEVERAL DAYS
GAD7 TOTAL SCORE: 9
2. NOT BEING ABLE TO STOP OR CONTROL WORRYING: NOT AT ALL
7. FEELING AFRAID AS IF SOMETHING AWFUL MIGHT HAPPEN: SEVERAL DAYS
IF YOU CHECKED OFF ANY PROBLEMS ON THIS QUESTIONNAIRE, HOW DIFFICULT HAVE THESE PROBLEMS MADE IT FOR YOU TO DO YOUR WORK, TAKE CARE OF THINGS AT HOME, OR GET ALONG WITH OTHER PEOPLE: SOMEWHAT DIFFICULT
6. BECOMING EASILY ANNOYED OR IRRITABLE: MORE THAN HALF THE DAYS
4. TROUBLE RELAXING: NEARLY EVERY DAY
1. FEELING NERVOUS, ANXIOUS, OR ON EDGE: SEVERAL DAYS
5. BEING SO RESTLESS THAT IT IS HARD TO SIT STILL: SEVERAL DAYS

## 2024-06-26 ASSESSMENT — PATIENT HEALTH QUESTIONNAIRE - PHQ9
5. POOR APPETITE OR OVEREATING: SEVERAL DAYS
1. LITTLE INTEREST OR PLEASURE IN DOING THINGS: MORE THAN HALF THE DAYS
6. FEELING BAD ABOUT YOURSELF - OR THAT YOU ARE A FAILURE OR HAVE LET YOURSELF OR YOUR FAMILY DOWN: SEVERAL DAYS
4. FEELING TIRED OR HAVING LITTLE ENERGY: NEARLY EVERY DAY
SUM OF ALL RESPONSES TO PHQ QUESTIONS 1-9: 14
SUM OF ALL RESPONSES TO PHQ9 QUESTIONS 1 & 2: 4
7. TROUBLE CONCENTRATING ON THINGS, SUCH AS READING THE NEWSPAPER OR WATCHING TELEVISION: NEARLY EVERY DAY
SUM OF ALL RESPONSES TO PHQ QUESTIONS 1-9: 14
3. TROUBLE FALLING OR STAYING ASLEEP: SEVERAL DAYS
2. FEELING DOWN, DEPRESSED OR HOPELESS: MORE THAN HALF THE DAYS
SUM OF ALL RESPONSES TO PHQ QUESTIONS 1-9: 14
9. THOUGHTS THAT YOU WOULD BE BETTER OFF DEAD, OR OF HURTING YOURSELF: NOT AT ALL
10. IF YOU CHECKED OFF ANY PROBLEMS, HOW DIFFICULT HAVE THESE PROBLEMS MADE IT FOR YOU TO DO YOUR WORK, TAKE CARE OF THINGS AT HOME, OR GET ALONG WITH OTHER PEOPLE: SOMEWHAT DIFFICULT
8. MOVING OR SPEAKING SO SLOWLY THAT OTHER PEOPLE COULD HAVE NOTICED. OR THE OPPOSITE, BEING SO FIGETY OR RESTLESS THAT YOU HAVE BEEN MOVING AROUND A LOT MORE THAN USUAL: SEVERAL DAYS
SUM OF ALL RESPONSES TO PHQ QUESTIONS 1-9: 14

## 2024-06-26 NOTE — PROGRESS NOTES
[facility]  Internal Medicine  Patient Encounter  River Figueroa D.O., [facility]        Chief Complaint   Patient presents with    Weight Management    Depression       HPI: 63 y.o. female seen today for a short interval follow-up regarding her depression and anxiety along with request to address weight management.  With regards to her depression and anxiety, she was feeling better with the combination of Cymbalta 60 mg daily and the addition of Wellbutrin  mg daily.  Unfortunately, her PHQ-9 scores are about the same and her AMI-7 score is slightly worse.  She states she feels a little better.  She is less tearful.  She still has decreased motivation.  She is seen by her therapist every 2 weeks.  Stress level is still high.  She states she is overwhelmed-- work, 's health, marital conflict.  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  As noted she sees her therapist.  She practices cognitive behavioral therapy.        6/26/2024     7:57 AM 5/23/2024     8:35 AM 4/30/2024    12:02 PM 4/28/2024    10:34 AM 3/14/2023     7:52 AM 3/10/2022     7:55 AM 2/12/2021    11:44 AM   PHQ Scores   PHQ2 Score 4 4 3 4 2 0 0   PHQ9 Score 14 13 9 16 2 0 0     Interpretation of Total Score Depression Severity: 1-4 = Minimal depression, 5-9 = Mild depression, 10-14 = Moderate depression, 15-19 = Moderately severe depression, 20-27 = Severe depression        6/26/2024     7:58 AM 5/23/2024     8:36 AM 4/30/2024    12:03 PM   AMI 7 SCORE   AMI-7 Total Score 9 6 3     Interpretation of AMI-7 score: 5-9 = mild anxiety, 10-14 = moderate anxiety, 15+ = severe anxiety. Recommend referral to behavioral health for scores 10 or greater.    Pt is still on Semaglutide 0.5 mg weekly.  She states she is tolerating the medication.  She states she experiences early satiety.  She eats less.  At work, she snacks a lot on high calorie foods.  She eats less at meal time.  She stopped walking.

## 2024-07-20 DIAGNOSIS — M50.30 DDD (DEGENERATIVE DISC DISEASE), CERVICAL: ICD-10-CM

## 2024-07-20 DIAGNOSIS — M79.7 FIBROMYALGIA: ICD-10-CM

## 2024-07-21 DIAGNOSIS — F43.21 ADJUSTMENT DISORDER WITH DEPRESSED MOOD: ICD-10-CM

## 2024-07-21 DIAGNOSIS — I87.2 VENOUS INSUFFICIENCY OF BOTH LOWER EXTREMITIES: ICD-10-CM

## 2024-07-21 DIAGNOSIS — E53.8 B12 DEFICIENCY: ICD-10-CM

## 2024-07-22 RX ORDER — BUPROPION HYDROCHLORIDE 150 MG/1
300 TABLET ORAL EVERY MORNING
Qty: 90 TABLET | Refills: 0 | Status: SHIPPED | OUTPATIENT
Start: 2024-07-22

## 2024-07-22 RX ORDER — TIZANIDINE 4 MG/1
TABLET ORAL
Qty: 90 TABLET | Refills: 3 | Status: SHIPPED | OUTPATIENT
Start: 2024-07-22

## 2024-07-22 RX ORDER — POTASSIUM CHLORIDE 20 MEQ/1
TABLET, EXTENDED RELEASE ORAL
Qty: 90 TABLET | Refills: 0 | Status: SHIPPED | OUTPATIENT
Start: 2024-07-22

## 2024-07-22 RX ORDER — SYRINGE W-NEEDLE,DISPOSAB,3 ML 25GX5/8"
3 SYRINGE, EMPTY DISPOSABLE MISCELLANEOUS
Qty: 3 EACH | Refills: 3 | Status: SHIPPED | OUTPATIENT
Start: 2024-07-22

## 2024-07-22 NOTE — TELEPHONE ENCOUNTER
Last appointment: 6/26/2024  Next appointment: 7/26/2024  Last refill:     Wellbutrin: 06/26/2024  Potassium chloride: 05/23/2024  Syringe: 04/30/2024

## 2024-07-26 ENCOUNTER — TELEPHONE (OUTPATIENT)
Dept: INTERNAL MEDICINE CLINIC | Age: 64
End: 2024-07-26

## 2024-07-26 ENCOUNTER — OFFICE VISIT (OUTPATIENT)
Dept: INTERNAL MEDICINE CLINIC | Age: 64
End: 2024-07-26
Payer: COMMERCIAL

## 2024-07-26 VITALS
SYSTOLIC BLOOD PRESSURE: 110 MMHG | WEIGHT: 209 LBS | OXYGEN SATURATION: 98 % | HEART RATE: 55 BPM | TEMPERATURE: 97.2 F | DIASTOLIC BLOOD PRESSURE: 72 MMHG | BODY MASS INDEX: 38.23 KG/M2

## 2024-07-26 DIAGNOSIS — I10 BENIGN ESSENTIAL HTN: ICD-10-CM

## 2024-07-26 DIAGNOSIS — E66.01 CLASS 2 SEVERE OBESITY DUE TO EXCESS CALORIES WITH SERIOUS COMORBIDITY AND BODY MASS INDEX (BMI) OF 38.0 TO 38.9 IN ADULT (HCC): ICD-10-CM

## 2024-07-26 DIAGNOSIS — G47.33 OSA (OBSTRUCTIVE SLEEP APNEA): ICD-10-CM

## 2024-07-26 DIAGNOSIS — R73.01 IMPAIRED FASTING GLUCOSE: ICD-10-CM

## 2024-07-26 DIAGNOSIS — R60.0 BILATERAL LOWER EXTREMITY EDEMA: ICD-10-CM

## 2024-07-26 DIAGNOSIS — I87.2 VENOUS INSUFFICIENCY OF BOTH LOWER EXTREMITIES: ICD-10-CM

## 2024-07-26 DIAGNOSIS — E53.8 B12 DEFICIENCY: ICD-10-CM

## 2024-07-26 DIAGNOSIS — Z01.818 PREOP EXAM FOR INTERNAL MEDICINE: Primary | ICD-10-CM

## 2024-07-26 DIAGNOSIS — M79.7 FIBROMYALGIA: ICD-10-CM

## 2024-07-26 DIAGNOSIS — M75.112 NONTRAUMATIC INCOMPLETE TEAR OF LEFT ROTATOR CUFF: ICD-10-CM

## 2024-07-26 PROCEDURE — 3074F SYST BP LT 130 MM HG: CPT | Performed by: INTERNAL MEDICINE

## 2024-07-26 PROCEDURE — 93000 ELECTROCARDIOGRAM COMPLETE: CPT | Performed by: INTERNAL MEDICINE

## 2024-07-26 PROCEDURE — 3078F DIAST BP <80 MM HG: CPT | Performed by: INTERNAL MEDICINE

## 2024-07-26 PROCEDURE — 99214 OFFICE O/P EST MOD 30 MIN: CPT | Performed by: INTERNAL MEDICINE

## 2024-07-26 RX ORDER — GABAPENTIN 100 MG/1
100 CAPSULE ORAL NIGHTLY
COMMUNITY

## 2024-07-26 NOTE — TELEPHONE ENCOUNTER
[facility] pharmacy is calling in for  in regards to needing a clarification on metformin sent in today. Per Pharmacy looks like she is getting more dispensed with same sig of  Take 1 tablet by mouth daily (with breakfast) and they are wanting to confirm that is correct or if she is meant to take more than 1 tab daily.     Please call pharmacy to verbaly confirm

## 2024-07-26 NOTE — PROGRESS NOTES
Dentition is in good repair.  No pharyngeal erythema or exudate.  NECK:  Supple.  Full ROM.  Trachea is midline.  No increased JVD.  No thyromegaly or nodules.  No masses  LYMPH: No C/SC/A/F nodes  CARDIAC:  S1S2 NL.  Regular rhythm.   No murmur/clicks/rubs.  No ectopy.  PMI is non-displaced.  VASC:  Pedal pulses 2/4.  Carotid upstrokes 2+.  No bruits noted.  + BL LE spider veins, dilated venules.    PULM:  Lungs are CTA.  Symmetric breath sounds noted.  AP Diameter NL.  GI:  Abdomen is soft and nontender.  No distension.  No organomegaly.  No masses.  No pulsatile masses.    EXT:  + BL LE Lipedema, + BL LE pitting edema.    SKIN: Warm and dry, normal turgor, no rash or lesions of concern.  NEURO:  Cranial nerves 2-12 are NL.  Speech fluent and coherent.  Strength is 5/5 in all muscle groups.  No sensory deficits.  No focal or lateralizing deficits.  Reflexes 2/4 and symmetric.  Gait is normal.  MS:  No C/T/L paraspinal tenderness.  Left shoulder with decreased range of motion in all planes, + impingement signs  PSYCH:  Mood and affect NL.  Judgement and insight NL.         Pre-Operative Risk assessment using 2014 ACC/AHA guidelines     Emergent procedure No  Active Cardiac Condition No (decompensated HF, Arrhythmia, MI <3 weeks, severe valve disease)  Risk Level of Procedure Intermediate Risk (intraperitoneal, intrathoracic, HENT, orthopedic, or carotid endarterectomy, etc.)  Revised Cardiac Risk Index Risk factors: None  Measurement of Exercise Tolerance before Surgery >4 Yes (light jog, brisk walking for 30-60 minutes, yard work, carry groceries up stairs).     According to the 2014 ACC/AHA pre-operative risk assessment guidelines Alysia Braxton is a low risk for major cardiac complications during a intermediate risk procedure and may continue as planned. Specific medication recommendations are listed below. Medications recommended to continue should be taken with a sip of water even when NPO.     Further

## 2024-07-27 RX ORDER — DULOXETIN HYDROCHLORIDE 60 MG/1
CAPSULE, DELAYED RELEASE ORAL
Qty: 90 CAPSULE | Refills: 3 | Status: SHIPPED | OUTPATIENT
Start: 2024-07-27

## 2024-07-27 NOTE — TELEPHONE ENCOUNTER
Refill Request     Last Seen: 7/26/2024    Last Written: 1/23/24    Next Appointment:   Future Appointments   Date Time Provider Department Center   8/30/2024  8:40 AM Namrata Tomlinson APRN [facility] SLEEP [facility]             Requested Prescriptions     Pending Prescriptions Disp Refills    DULoxetine (CYMBALTA) 60 MG extended release capsule [Pharmacy Med Name: DULOXETINE HCL DR 60 MG CAP] 90 capsule 1     Sig: TAKE 1 CAPSULE BY MOUTH EVERY DAY    1/23/24

## 2024-08-03 ENCOUNTER — HOSPITAL ENCOUNTER (OUTPATIENT)
Age: 64
Discharge: HOME | End: 2024-08-03
Payer: COMMERCIAL

## 2024-08-03 DIAGNOSIS — E53.8 B12 DEFICIENCY: ICD-10-CM

## 2024-08-03 DIAGNOSIS — Z01.818 PREOP EXAM FOR INTERNAL MEDICINE: ICD-10-CM

## 2024-08-03 DIAGNOSIS — R73.01 IMPAIRED FASTING GLUCOSE: ICD-10-CM

## 2024-08-03 DIAGNOSIS — I10 BENIGN ESSENTIAL HTN: ICD-10-CM

## 2024-08-03 LAB
ANION GAP SERPL CALCULATED.3IONS-SCNC: 12 MMOL/L (ref 3–16)
BASOPHILS # BLD: 0.1 K/UL (ref 0–0.2)
BASOPHILS NFR BLD: 1.2 %
BUN SERPL-MCNC: 17 MG/DL (ref 7–20)
CALCIUM SERPL-MCNC: 10 MG/DL (ref 8.3–10.6)
CHLORIDE SERPL-SCNC: 104 MMOL/L (ref 99–110)
CO2 SERPL-SCNC: 24 MMOL/L (ref 21–32)
CREAT SERPL-MCNC: 0.9 MG/DL (ref 0.6–1.2)
DEPRECATED RDW RBC AUTO: 14.1 % (ref 12.4–15.4)
EOSINOPHIL # BLD: 0.1 K/UL (ref 0–0.6)
EOSINOPHIL NFR BLD: 2.7 %
FOLATE SERPL-MCNC: 13.02 NG/ML (ref 4.78–24.2)
GFR SERPLBLD CREATININE-BSD FMLA CKD-EPI: 72 ML/MIN/{1.73_M2}
GLUCOSE SERPL-MCNC: 93 MG/DL (ref 70–99)
HCT VFR BLD AUTO: 37.3 % (ref 36–48)
HGB BLD-MCNC: 12.3 G/DL (ref 12–16)
LYMPHOCYTES # BLD: 1.5 K/UL (ref 1–5.1)
LYMPHOCYTES NFR BLD: 31.3 %
MCH RBC QN AUTO: 29.8 PG (ref 26–34)
MCHC RBC AUTO-ENTMCNC: 33 G/DL (ref 31–36)
MCV RBC AUTO: 90.5 FL (ref 80–100)
MONOCYTES # BLD: 0.6 K/UL (ref 0–1.3)
MONOCYTES NFR BLD: 12.5 %
NEUTROPHILS # BLD: 2.5 K/UL (ref 1.7–7.7)
NEUTROPHILS NFR BLD: 52.3 %
PLATELET # BLD AUTO: 341 K/UL (ref 135–450)
PMV BLD AUTO: 8.7 FL (ref 5–10.5)
POTASSIUM SERPL-SCNC: 4.4 MMOL/L (ref 3.5–5.1)
RBC # BLD AUTO: 4.12 M/UL (ref 4–5.2)
SODIUM SERPL-SCNC: 140 MMOL/L (ref 136–145)
VIT B12 SERPL-MCNC: 374 PG/ML (ref 211–911)
WBC # BLD AUTO: 4.8 K/UL (ref 4–11)

## 2024-08-03 PROCEDURE — 83036 HEMOGLOBIN GLYCOSYLATED A1C: CPT

## 2024-08-03 PROCEDURE — 82746 ASSAY OF FOLIC ACID SERUM: CPT

## 2024-08-03 PROCEDURE — 82607 VITAMIN B-12: CPT

## 2024-08-03 PROCEDURE — 85025 COMPLETE CBC W/AUTO DIFF WBC: CPT

## 2024-08-03 PROCEDURE — 36415 COLL VENOUS BLD VENIPUNCTURE: CPT

## 2024-08-03 PROCEDURE — 80048 BASIC METABOLIC PNL TOTAL CA: CPT

## 2024-08-04 LAB
EKG ATRIAL RATE: 81 BPM
EKG DIAGNOSIS: NORMAL
EKG P AXIS: 36 DEGREES
EKG P-R INTERVAL: 180 MS
EKG Q-T INTERVAL: 380 MS
EKG QRS DURATION: 82 MS
EKG QTC CALCULATION (BAZETT): 441 MS
EKG R AXIS: 22 DEGREES
EKG T AXIS: 37 DEGREES
EKG VENTRICULAR RATE: 81 BPM
EST. AVERAGE GLUCOSE BLD GHB EST-MCNC: 114 MG/DL
HBA1C MFR BLD: 5.6 %

## 2024-08-05 ENCOUNTER — TELEPHONE (OUTPATIENT)
Dept: PULMONOLOGY | Age: 64
End: 2024-08-05

## 2024-08-05 NOTE — TELEPHONE ENCOUNTER
Pt called stating she is needing an order for PAP supplies sent to [facility]. Last OV on 07/2023, next OV for 8/30/24.    Ph. [phone]

## 2024-08-06 NOTE — PROGRESS NOTES
Diagnosis: [x] GAYATHRI (G47.33) [] CSA (G47.31) [] Apnea (G47.30)   Length of Need: [] 15 Months [] 99 Months [x] Other: 3 months.   Machine (CHRISSY!): [] Respironics Dream Station      Auto [] ResMed AirSense     Auto [] Other:     []  CPAP () [] Bilevel ()   Mode: [] Auto [] Spontaneous    Mode: [] Auto [] Spontaneous              Comfort Settings:      Humidifier: [] Heated ()        [x] Water chamber replacement ()/ 1 per 6 months        Mask:   [x] Nasal () /1 per 3 months [] Full Face () /1 per 3 months   [x] Patient choice -Size and fit mask [] Patient Choice - Size and fit mask   [] Dispense: [] Dispense:   [x] Headgear () / 1 per 3 months [] Headgear () / 1 per 3 months   [] Replacement Nasal Cushion ()/2 per month [] Interface Replacement ()/1 per month   [x] Replacement Nasal Pillows ()/2 per month         Tubing: [x] Heated ()/1 per 3 months    [] Standard ()/1 per 3 months [] Other:           Filters: [x] Non-disposable ()/1 per 6 months     [x] Ultra-Fine, Disposable ()/2 per month        Miscellaneous: [] Chin Strap ()/ 1 per 6 months [] O2 bleed-in:        LPM   [] Oxymetry on CPAP/Bilevel []  Other:         Start Order Date: 08/05/24    MEDICAL JUSTIFICATION:  I, the undersigned, certify that the above prescribed supplies are medically necessary for this patient’s wellbeing.  In my opinion, the supplies are both reasonable and necessary in reference to accepted standards of medicalpractice in treatment of this patient’s condition.    SHYAM HERNANDEZ NP    NPI: [id number]       Order Signed Date: 08/05/24  [facility]  Pulmonary, Sleep, and Critical Care    Pulmonary, Sleep, and Critical Care  [facility]                          [address], [address]  [place] [address]                                    [place] [address]  Phone: [phone]    Fax:

## 2024-08-27 ASSESSMENT — SLEEP AND FATIGUE QUESTIONNAIRES
HOW LIKELY ARE YOU TO NOD OFF OR FALL ASLEEP WHILE SITTING AND TALKING TO SOMEONE: WOULD NEVER DOZE
HOW LIKELY ARE YOU TO NOD OFF OR FALL ASLEEP WHILE WATCHING TV: WOULD NEVER DOZE
HOW LIKELY ARE YOU TO NOD OFF OR FALL ASLEEP WHEN YOU ARE A PASSENGER IN A CAR FOR AN HOUR WITHOUT A BREAK: HIGH CHANCE OF DOZING
HOW LIKELY ARE YOU TO NOD OFF OR FALL ASLEEP WHEN YOU ARE A PASSENGER IN A CAR FOR AN HOUR WITHOUT A BREAK: HIGH CHANCE OF DOZING
HOW LIKELY ARE YOU TO NOD OFF OR FALL ASLEEP WHILE SITTING INACTIVE IN A PUBLIC PLACE: SLIGHT CHANCE OF DOZING
HOW LIKELY ARE YOU TO NOD OFF OR FALL ASLEEP WHILE SITTING AND TALKING TO SOMEONE: WOULD NEVER DOZE
HOW LIKELY ARE YOU TO NOD OFF OR FALL ASLEEP IN A CAR, WHILE STOPPED FOR A FEW MINUTES IN TRAFFIC: WOULD NEVER DOZE
HOW LIKELY ARE YOU TO NOD OFF OR FALL ASLEEP WHILE SITTING QUIETLY AFTER LUNCH WITHOUT ALCOHOL: SLIGHT CHANCE OF DOZING
HOW LIKELY ARE YOU TO NOD OFF OR FALL ASLEEP WHILE SITTING AND READING: SLIGHT CHANCE OF DOZING
HOW LIKELY ARE YOU TO NOD OFF OR FALL ASLEEP WHILE SITTING QUIETLY AFTER LUNCH WITHOUT ALCOHOL: SLIGHT CHANCE OF DOZING
HOW LIKELY ARE YOU TO NOD OFF OR FALL ASLEEP WHILE LYING DOWN TO REST IN THE AFTERNOON WHEN CIRCUMSTANCES PERMIT: MODERATE CHANCE OF DOZING
HOW LIKELY ARE YOU TO NOD OFF OR FALL ASLEEP WHILE SITTING AND READING: SLIGHT CHANCE OF DOZING
HOW LIKELY ARE YOU TO NOD OFF OR FALL ASLEEP WHILE LYING DOWN TO REST IN THE AFTERNOON WHEN CIRCUMSTANCES PERMIT: MODERATE CHANCE OF DOZING
HOW LIKELY ARE YOU TO NOD OFF OR FALL ASLEEP IN A CAR, WHILE STOPPED FOR A FEW MINUTES IN TRAFFIC: WOULD NEVER DOZE
HOW LIKELY ARE YOU TO NOD OFF OR FALL ASLEEP WHILE WATCHING TV: WOULD NEVER DOZE
ESS TOTAL SCORE: 8
HOW LIKELY ARE YOU TO NOD OFF OR FALL ASLEEP WHILE SITTING INACTIVE IN A PUBLIC PLACE: SLIGHT CHANCE OF DOZING

## 2024-08-30 ENCOUNTER — OFFICE VISIT (OUTPATIENT)
Dept: PULMONOLOGY | Age: 64
End: 2024-08-30

## 2024-08-30 VITALS
HEART RATE: 84 BPM | HEIGHT: 62 IN | OXYGEN SATURATION: 96 % | DIASTOLIC BLOOD PRESSURE: 81 MMHG | SYSTOLIC BLOOD PRESSURE: 116 MMHG | WEIGHT: 214.8 LBS | BODY MASS INDEX: 39.53 KG/M2

## 2024-08-30 DIAGNOSIS — G47.33 OSA (OBSTRUCTIVE SLEEP APNEA): Primary | ICD-10-CM

## 2024-08-30 DIAGNOSIS — I10 BENIGN ESSENTIAL HTN: ICD-10-CM

## 2024-08-30 DIAGNOSIS — E66.01 CLASS 2 SEVERE OBESITY DUE TO EXCESS CALORIES WITH SERIOUS COMORBIDITY AND BODY MASS INDEX (BMI) OF 39.0 TO 39.9 IN ADULT (HCC): ICD-10-CM

## 2024-08-30 NOTE — ASSESSMENT & PLAN NOTE
Chronic-Stable: Reviewed and analyzed results of physiologic download from patient's machine and reviewed with patient.  Supplies and parts as needed for her machine.  These are medically necessary.  Limit caffeine use after 3pm. Based on the analyzed data will continue with current settings. Stable on her machine at current settings, getting benefit from the use, and having minimal side effects.  Discussed trying a chinstrap with a nasal mask or consider switching to a fullface mask help control oral leak and dryness.  Recent increased events likely due to narcotic pain medications related to her rotator cuff repair.  Will pull machine data report in 1 month to review.  Will see her back in 1 year.  Encouraged her to contact the office any questions or concerns.

## 2024-08-30 NOTE — PROGRESS NOTES
Diagnosis: [x] GAYATHRI (G47.33) [] CSA (G47.31) [] Apnea (G47.30)   Length of Need: [x] 15 Months [] 99 Months [] Other:   Machine (CHRISSY!): [] Respironics Dream Station      Auto [] ResMed AirSense     Auto [] Other:     []  CPAP () [] Bilevel ()   Mode: [] Auto [] Spontaneous    Mode: [] Auto [] Spontaneous              Comfort Settings:      Humidifier: [] Heated ()        [x] Water chamber replacement ()/ 1 per 6 months        Mask:   [x] Nasal () /1 per 3 months [] Full Face () /1 per 3 months   [x] Patient choice -Size and fit mask [] Patient Choice - Size and fit mask   [] Dispense: [] Dispense:   [x] Headgear () / 1 per 3 months [] Headgear () / 1 per 3 months   [] Replacement Nasal Cushion ()/2 per month [] Interface Replacement ()/1 per month   [x] Replacement Nasal Pillows ()/2 per month         Tubing: [x] Heated ()/1 per 3 months    [] Standard ()/1 per 3 months [] Other:           Filters: [x] Non-disposable ()/1 per 6 months     [x] Ultra-Fine, Disposable ()/2 per month        Miscellaneous: [x] Chin Strap ()/ 1 per 6 months [] O2 bleed-in:        LPM   [] Oxymetry on CPAP/Bilevel []  Other:         Start Order Date: 08/30/24    MEDICAL JUSTIFICATION:  I, the undersigned, certify that the above prescribed supplies are medically necessary for this patient’s wellbeing.  In my opinion, the supplies are both reasonable and necessary in reference to accepted standards of medicalpractice in treatment of this patient’s condition.    SHYAM HERNANDEZ NP    NPI: [id number]       Order Signed Date: 08/30/24  [facility]  Pulmonary, Sleep, and Critical Care    Pulmonary, Sleep, and Critical Care  [facility] [facility] [address]                          [address] [place] [address] [address]  [place] [address]                                    [place] [address]  Phone: [phone]    Fax:

## 2024-08-30 NOTE — PROGRESS NOTES
Levon Tomlinson [facility]  [address]  [address]  [place] [address]  P- [phone]  F- [phone]   [facility] [place] [facility] SLEEP MEDICINE  [address]  [address]  [facility] [address]  Dept: [phone]  Dept Fax: [phone]  Loc: [phone]      Assessment/Plan:      1. GAYATHRI (obstructive sleep apnea)  Assessment & Plan:  Chronic-Stable: Reviewed and analyzed results of physiologic download from patient's machine and reviewed with patient.  Supplies and parts as needed for her machine.  These are medically necessary.  Limit caffeine use after 3pm. Based on the analyzed data will continue with current settings. Stable on her machine at current settings, getting benefit from the use, and having minimal side effects.  Discussed trying a chinstrap with a nasal mask or consider switching to a fullface mask help control oral leak and dryness.  Recent increased events likely due to narcotic pain medications related to her rotator cuff repair.  Will see her back in 1 year.  Encouraged her to contact the office any questions or concerns.    2. Benign essential HTN  Assessment & Plan:  Chronic- Stable.  Discussed the importance of treating obstructive sleep apnea as part of the management of this disorder.  Cont any meds per PCP and other physicians.    3. Class 2 severe obesity due to excess calories with serious comorbidity and body mass index (BMI) of 39.0 to 39.9 in adult (HCC)  Assessment & Plan:  Chronic-not stable:  Discussed importance of treating obstructive sleep apnea and getting sufficient sleep to assist with weight control.  Discussed weight gain and/or weight loss may require adjustments to machine settings. Encouraged her to work on weight loss through diet and exercise.         Reviewed, analyzed, and documented physiologic data from patient's PAP machine.    This information was analyzed to assess complexity  1 tablet, Oral, DAILY    Multiple Vitamin (MULTIVITAMIN PO) DAILY    Ozempic (1 MG/DOSE) 1 mg, SubCUTAneous, EVERY 7 DAYS    potassium chloride (KLOR-CON M) 20 MEQ extended release tablet TAKE 1 TABLET BY MOUTH DAILY if taking Lasix    solifenacin (VESICARE) 10 mg, Oral, DAILY    Syringe/Needle, Disp, (SYRINGE 3CC/25GX1\") 25G X 1\" 3 ML MISC 3 Devices, Does not apply, EVERY 30 DAYS    tiZANidine (ZANAFLEX) 4 MG tablet TAKE 1 TABLET BY MOUTH EVERY NIGHT    valsartan (DIOVAN) 80 mg, Oral, DAILY        Objective   Vitals:  Weight BMI   Wt Readings from Last 3 Encounters:   08/30/24 97.4 kg (214 lb 12.8 oz)   07/26/24 94.8 kg (209 lb)   06/26/24 94 kg (207 lb 3.2 oz)    Body mass index is 39.29 kg/m².     BP HR SaO2   BP Readings from Last 3 Encounters:   08/30/24 116/81   07/26/24 110/72   06/26/24 96/70    Pulse Readings from Last 3 Encounters:   08/30/24 84   07/26/24 55   06/26/24 90    SpO2 Readings from Last 3 Encounters:   08/30/24 96%   07/26/24 98%   06/26/24 98%         Electronically signed by ASHLY Carlos on 8/30/2024 at 8:53 AM

## 2024-09-03 DIAGNOSIS — F43.21 ADJUSTMENT DISORDER WITH DEPRESSED MOOD: ICD-10-CM

## 2024-09-03 RX ORDER — BUPROPION HYDROCHLORIDE 150 MG/1
300 TABLET ORAL EVERY MORNING
Qty: 90 TABLET | Refills: 3 | Status: SHIPPED | OUTPATIENT
Start: 2024-09-03

## 2024-09-04 ENCOUNTER — SCHEDULED TELEPHONE ENCOUNTER (OUTPATIENT)
Dept: INTERNAL MEDICINE CLINIC | Age: 64
End: 2024-09-04
Payer: COMMERCIAL

## 2024-09-04 DIAGNOSIS — Z71.84 COUNSELING ABOUT TRAVEL: ICD-10-CM

## 2024-09-04 DIAGNOSIS — F40.243 ANXIETY WITH FLYING: Primary | ICD-10-CM

## 2024-09-04 DIAGNOSIS — Z29.89 NEED FOR MALARIA PROPHYLAXIS: ICD-10-CM

## 2024-09-04 PROCEDURE — 99442 PR PHYS/QHP TELEPHONE EVALUATION 11-20 MIN: CPT | Performed by: INTERNAL MEDICINE

## 2024-09-04 RX ORDER — ATOVAQUONE AND PROGUANIL HYDROCHLORIDE 250; 100 MG/1; MG/1
1 TABLET, FILM COATED ORAL DAILY
Qty: 22 TABLET | Refills: 0 | Status: SHIPPED | OUTPATIENT
Start: 2024-09-17 | End: 2024-10-09

## 2024-09-04 RX ORDER — LORAZEPAM 0.5 MG/1
TABLET ORAL
Qty: 6 TABLET | Refills: 0 | Status: SHIPPED | OUTPATIENT
Start: 2024-09-04 | End: 2024-10-04

## 2024-09-04 NOTE — PROGRESS NOTES
[facility]  Internal Medicine  Patient Encounter  River Figueroa D.O., [facility]    Chief Complaint   Patient presents with    Travel     HPI: Patient is being evaluated today for travel counseling.  She is requesting a prescription for malaria prophylaxis.  She and her  will be traveling to [place] on 9/19/2024 - 10/2/2024.  She is also requesting medication to help with her flying anxiety.  Patient has been on Ativan in the past which worked well.      I communicated with the patient and/or health care decision maker about malaria prophylaxis, anxiety, flying phobia.     Details of this discussion including any medical advice provided: See below    Encounter Diagnoses   Name Primary?    Counseling about travel     Need for malaria prophylaxis     Anxiety with flying Yes       PLAN:  #1 malaria prophylaxis-Malarone.  Patient will start 2 days prior to travel.  #2 Ativan 0.5 mg for her flights.  See order.  #3 counseled patient on avoiding alcohol while taking Ativan  #4 patient counseled on the potential adverse side effects and habit-forming nature of the medication.        Total Time: minutes: 11-20 minutes (12)    Alysia Braxton was evaluated through a synchronous (real-time) audio encounter. Patient identification was verified at the start of the visit. She (or guardian if applicable) is aware that this is a billable service, which includes applicable co-pays. This visit was conducted with the patient's (and/or legal guardian's) verbal consent. She has not had a related appointment within my department in the past 7 days or scheduled within the next 24 hours.   The patient was located at Home: [address] [place], [place] [address] [facility] [address] [place] [address].  The provider was located at Facility (Appt Dept): [address] [place], [place] [id number].  Confirm you are appropriately licensed, registered, or certified to deliver care in the state where the patient is located as indicated above. If you are not or

## 2024-09-27 ENCOUNTER — TELEPHONE (OUTPATIENT)
Dept: PULMONOLOGY | Age: 64
End: 2024-09-27

## 2024-10-11 DIAGNOSIS — I10 BENIGN ESSENTIAL HTN: ICD-10-CM

## 2024-10-11 NOTE — TELEPHONE ENCOUNTER
Last appointment: 9/4/2024  Next appointment: Visit date not found  Last refill: 4/11/24  Requested Prescriptions     Pending Prescriptions Disp Refills    valsartan (DIOVAN) 80 MG tablet [Pharmacy Med Name: VALSARTAN 80MG TABLETS] 90 tablet 1     Sig: TAKE 1 TABLET BY MOUTH DAILY

## 2024-10-13 RX ORDER — VALSARTAN 80 MG/1
80 TABLET ORAL DAILY
Qty: 90 TABLET | Refills: 1 | Status: SHIPPED | OUTPATIENT
Start: 2024-10-13

## 2024-10-18 ENCOUNTER — TELEPHONE (OUTPATIENT)
Dept: INTERNAL MEDICINE CLINIC | Age: 64
End: 2024-10-18

## 2024-10-18 DIAGNOSIS — M79.7 FIBROMYALGIA: Primary | ICD-10-CM

## 2024-10-18 RX ORDER — TIZANIDINE 2 MG/1
4 TABLET ORAL NIGHTLY PRN
Qty: 60 TABLET | Refills: 2 | Status: SHIPPED | OUTPATIENT
Start: 2024-10-18

## 2024-10-18 NOTE — TELEPHONE ENCOUNTER
[facility] pharmacy is calling in for  in regards to medication refill for tiZANidine (ZANAFLEX) 4 MG tablet .     Per pharmacy medication is on back order from  but they do have the 2 mg in stock. They are wanting to know if provider is willing to send in a new script for the 2 mg tab with sig: TAKE 2 TABLET BY MOUTH EVERY NIGHT?.    Please advise.

## 2024-11-26 ENCOUNTER — TELEPHONE (OUTPATIENT)
Dept: INTERNAL MEDICINE CLINIC | Age: 64
End: 2024-11-26

## 2024-11-26 DIAGNOSIS — Z98.890 S/P LEFT BREAST BIOPSY: Primary | ICD-10-CM

## 2024-11-26 NOTE — TELEPHONE ENCOUNTER
[facility]  is calling in for  in regards to needing an order for a LT diagnostic mammogram due to the patient's results on most recent biopsy.    Please advise.

## 2024-12-03 ENCOUNTER — TELEPHONE (OUTPATIENT)
Dept: INTERNAL MEDICINE CLINIC | Age: 64
End: 2024-12-03

## 2024-12-03 DIAGNOSIS — R92.8 ABNORMAL MAMMOGRAM OF LEFT BREAST: Primary | ICD-10-CM

## 2024-12-03 NOTE — TELEPHONE ENCOUNTER
Central scheduling is calling in for  in regards to patient needing to schedule a chano of the breast but they are also needing an US of the left breast for patient.    Please place order

## 2024-12-04 ENCOUNTER — OFFICE VISIT (OUTPATIENT)
Dept: INTERNAL MEDICINE CLINIC | Age: 64
End: 2024-12-04

## 2024-12-04 VITALS — SYSTOLIC BLOOD PRESSURE: 122 MMHG | BODY MASS INDEX: 38.59 KG/M2 | WEIGHT: 211 LBS | DIASTOLIC BLOOD PRESSURE: 78 MMHG

## 2024-12-04 DIAGNOSIS — M25.532 PAIN IN BOTH WRISTS: Primary | ICD-10-CM

## 2024-12-04 DIAGNOSIS — M25.531 PAIN IN BOTH WRISTS: Primary | ICD-10-CM

## 2024-12-11 ENCOUNTER — HOSPITAL ENCOUNTER (OUTPATIENT)
Dept: MAMMOGRAPHY | Age: 64
Discharge: HOME | End: 2024-12-11
Attending: INTERNAL MEDICINE
Payer: COMMERCIAL

## 2024-12-11 ENCOUNTER — APPOINTMENT (OUTPATIENT)
Dept: ULTRASOUND IMAGING | Age: 64
End: 2024-12-11
Attending: INTERNAL MEDICINE
Payer: COMMERCIAL

## 2024-12-11 DIAGNOSIS — Z98.890 S/P LEFT BREAST BIOPSY: ICD-10-CM

## 2024-12-11 PROCEDURE — G0279 TOMOSYNTHESIS, MAMMO: HCPCS

## 2024-12-23 DIAGNOSIS — M79.7 FIBROMYALGIA: ICD-10-CM

## 2024-12-23 DIAGNOSIS — E53.8 B12 DEFICIENCY: ICD-10-CM

## 2024-12-23 DIAGNOSIS — M50.30 DDD (DEGENERATIVE DISC DISEASE), CERVICAL: ICD-10-CM

## 2024-12-23 RX ORDER — SYRINGE W-NEEDLE,DISPOSAB,3 ML 25GX5/8"
3 SYRINGE, EMPTY DISPOSABLE MISCELLANEOUS
Qty: 3 EACH | Refills: 3 | Status: SHIPPED | OUTPATIENT
Start: 2024-12-23

## 2024-12-23 NOTE — TELEPHONE ENCOUNTER
Last appointment: 12/4/2024  Next appointment: Visit date not found  Last refill:   Requested Prescriptions     Pending Prescriptions Disp Refills    tiZANidine (ZANAFLEX) 4 MG tablet 90 tablet 3     Sig: TAKE 1 TABLET BY MOUTH EVERY NIGHT    Syringe/Needle, Disp, (SYRINGE 3CC/25GX1\") 25G X 1\" 3 ML MISC 3 each 3     Sig: 3 Devices by Does not apply route every 30 days

## 2025-01-20 DIAGNOSIS — I87.2 VENOUS INSUFFICIENCY OF BOTH LOWER EXTREMITIES: ICD-10-CM

## 2025-01-20 RX ORDER — POTASSIUM CHLORIDE 1500 MG/1
TABLET, EXTENDED RELEASE ORAL
Qty: 90 TABLET | Refills: 3 | Status: SHIPPED | OUTPATIENT
Start: 2025-01-20

## 2025-01-20 NOTE — TELEPHONE ENCOUNTER
Last appointment: 12/4/2024  Next appointment: Visit date not found    Sent [employer] message to schedule due/overdue appointment.     Last refill: 7/22/24

## 2025-02-06 SDOH — ECONOMIC STABILITY: TRANSPORTATION INSECURITY
IN THE PAST 12 MONTHS, HAS LACK OF TRANSPORTATION KEPT YOU FROM MEETINGS, WORK, OR FROM GETTING THINGS NEEDED FOR DAILY LIVING?: NO

## 2025-02-06 SDOH — ECONOMIC STABILITY: FOOD INSECURITY: WITHIN THE PAST 12 MONTHS, THE FOOD YOU BOUGHT JUST DIDN'T LAST AND YOU DIDN'T HAVE MONEY TO GET MORE.: NEVER TRUE

## 2025-02-06 SDOH — ECONOMIC STABILITY: INCOME INSECURITY: IN THE LAST 12 MONTHS, WAS THERE A TIME WHEN YOU WERE NOT ABLE TO PAY THE MORTGAGE OR RENT ON TIME?: NO

## 2025-02-06 SDOH — ECONOMIC STABILITY: FOOD INSECURITY: WITHIN THE PAST 12 MONTHS, YOU WORRIED THAT YOUR FOOD WOULD RUN OUT BEFORE YOU GOT MONEY TO BUY MORE.: NEVER TRUE

## 2025-02-06 SDOH — ECONOMIC STABILITY: TRANSPORTATION INSECURITY
IN THE PAST 12 MONTHS, HAS THE LACK OF TRANSPORTATION KEPT YOU FROM MEDICAL APPOINTMENTS OR FROM GETTING MEDICATIONS?: NO

## 2025-02-06 ASSESSMENT — PATIENT HEALTH QUESTIONNAIRE - PHQ9
SUM OF ALL RESPONSES TO PHQ9 QUESTIONS 1 & 2: 2
7. TROUBLE CONCENTRATING ON THINGS, SUCH AS READING THE NEWSPAPER OR WATCHING TELEVISION: NOT AT ALL
5. POOR APPETITE OR OVEREATING: SEVERAL DAYS
SUM OF ALL RESPONSES TO PHQ QUESTIONS 1-9: 8
8. MOVING OR SPEAKING SO SLOWLY THAT OTHER PEOPLE COULD HAVE NOTICED. OR THE OPPOSITE, BEING SO FIGETY OR RESTLESS THAT YOU HAVE BEEN MOVING AROUND A LOT MORE THAN USUAL: NOT AT ALL
6. FEELING BAD ABOUT YOURSELF - OR THAT YOU ARE A FAILURE OR HAVE LET YOURSELF OR YOUR FAMILY DOWN: SEVERAL DAYS
9. THOUGHTS THAT YOU WOULD BE BETTER OFF DEAD, OR OF HURTING YOURSELF: NOT AT ALL
10. IF YOU CHECKED OFF ANY PROBLEMS, HOW DIFFICULT HAVE THESE PROBLEMS MADE IT FOR YOU TO DO YOUR WORK, TAKE CARE OF THINGS AT HOME, OR GET ALONG WITH OTHER PEOPLE: NOT DIFFICULT AT ALL
7. TROUBLE CONCENTRATING ON THINGS, SUCH AS READING THE NEWSPAPER OR WATCHING TELEVISION: NOT AT ALL
5. POOR APPETITE OR OVEREATING: SEVERAL DAYS
4. FEELING TIRED OR HAVING LITTLE ENERGY: NEARLY EVERY DAY
SUM OF ALL RESPONSES TO PHQ9 QUESTIONS 1 & 2: 3
4. FEELING TIRED OR HAVING LITTLE ENERGY: NEARLY EVERY DAY
SUM OF ALL RESPONSES TO PHQ QUESTIONS 1-9: 2
10. IF YOU CHECKED OFF ANY PROBLEMS, HOW DIFFICULT HAVE THESE PROBLEMS MADE IT FOR YOU TO DO YOUR WORK, TAKE CARE OF THINGS AT HOME, OR GET ALONG WITH OTHER PEOPLE: NOT DIFFICULT AT ALL
SUM OF ALL RESPONSES TO PHQ QUESTIONS 1-9: 2
SUM OF ALL RESPONSES TO PHQ QUESTIONS 1-9: 8
1. LITTLE INTEREST OR PLEASURE IN DOING THINGS: SEVERAL DAYS
3. TROUBLE FALLING OR STAYING ASLEEP: NOT AT ALL
SUM OF ALL RESPONSES TO PHQ QUESTIONS 1-9: 8
1. LITTLE INTEREST OR PLEASURE IN DOING THINGS: MORE THAN HALF THE DAYS
SUM OF ALL RESPONSES TO PHQ9 QUESTIONS 1 & 2: 2
2. FEELING DOWN, DEPRESSED OR HOPELESS: SEVERAL DAYS
2. FEELING DOWN, DEPRESSED OR HOPELESS: SEVERAL DAYS
SUM OF ALL RESPONSES TO PHQ QUESTIONS 1-9: 8
1. LITTLE INTEREST OR PLEASURE IN DOING THINGS: MORE THAN HALF THE DAYS
SUM OF ALL RESPONSES TO PHQ QUESTIONS 1-9: 8
6. FEELING BAD ABOUT YOURSELF - OR THAT YOU ARE A FAILURE OR HAVE LET YOURSELF OR YOUR FAMILY DOWN: SEVERAL DAYS
8. MOVING OR SPEAKING SO SLOWLY THAT OTHER PEOPLE COULD HAVE NOTICED. OR THE OPPOSITE - BEING SO FIDGETY OR RESTLESS THAT YOU HAVE BEEN MOVING AROUND A LOT MORE THAN USUAL: NOT AT ALL
SUM OF ALL RESPONSES TO PHQ QUESTIONS 1-9: 2
3. TROUBLE FALLING OR STAYING ASLEEP: NOT AT ALL
2. FEELING DOWN, DEPRESSED OR HOPELESS: SEVERAL DAYS
1. LITTLE INTEREST OR PLEASURE IN DOING THINGS: SEVERAL DAYS
9. THOUGHTS THAT YOU WOULD BE BETTER OFF DEAD, OR OF HURTING YOURSELF: NOT AT ALL
SUM OF ALL RESPONSES TO PHQ QUESTIONS 1-9: 2

## 2025-02-08 ENCOUNTER — OFFICE VISIT (OUTPATIENT)
Dept: INTERNAL MEDICINE CLINIC | Age: 65
End: 2025-02-08

## 2025-02-08 VITALS
TEMPERATURE: 96.8 F | HEART RATE: 80 BPM | SYSTOLIC BLOOD PRESSURE: 118 MMHG | BODY MASS INDEX: 38.23 KG/M2 | WEIGHT: 209 LBS | OXYGEN SATURATION: 98 % | DIASTOLIC BLOOD PRESSURE: 68 MMHG

## 2025-02-08 DIAGNOSIS — I10 BENIGN ESSENTIAL HTN: Primary | ICD-10-CM

## 2025-02-08 DIAGNOSIS — E66.812 CLASS 2 SEVERE OBESITY DUE TO EXCESS CALORIES WITH SERIOUS COMORBIDITY AND BODY MASS INDEX (BMI) OF 38.0 TO 38.9 IN ADULT: ICD-10-CM

## 2025-02-08 DIAGNOSIS — E66.01 CLASS 2 SEVERE OBESITY DUE TO EXCESS CALORIES WITH SERIOUS COMORBIDITY AND BODY MASS INDEX (BMI) OF 38.0 TO 38.9 IN ADULT: ICD-10-CM

## 2025-02-08 DIAGNOSIS — G47.33 OSA (OBSTRUCTIVE SLEEP APNEA): ICD-10-CM

## 2025-02-08 DIAGNOSIS — Z91.89 AT INCREASED RISK FOR CARDIOVASCULAR DISEASE: ICD-10-CM

## 2025-02-08 NOTE — PROGRESS NOTES
[facility]  Internal Medicine  Patient Encounter  River Figueroa D.O., [facility]        Chief Complaint   Patient presents with    Hypertension     Noticed elevated BP x 1-2 weeks       HPI: 64 y.o. female seen today with concern about her blood pressure.  Patient sent an email on 2/4/2025, 4 days ago reporting that her blood pressure was 133/90 and 140/90 at a doctors appointments.  She purchased a blood pressure monitor from the pharmacy.  Her home readings read 197/93 and 177/100.  Additional readings were obtained at [facility]-- 153/103, 163/97.  Other home readings-- 137/93, 118/82, 152/90.  Patient does have a history of hypertension and is on valsartan 80 mg daily.  This started when she had been placed on Myrbetriq.  She denies CP, SOB, HA's, swelling, dizziness.  She does have some swelling in her legs, but this is much better with compression stockings.  She is using her CPAP.      Pt states she wants to go back on Semaglutide.  She has been off of the medication since July 2024.  She would like to restart     Past Medical History:   Diagnosis Date    Allergic rhinitis     Anxiety     Asthma     Benign essential HTN 12/26/2019    COVID-19 virus infection 05/02/2023    Depression     Fibromyalgia 08/26/2022    Gastritis     GERD (gastroesophageal reflux disease)     Hearing disorder, sensorineural 01/21/2015    Hiatal hernia     Impaired fasting glucose 02/12/2021    Iron defic anemia     OAB (overactive bladder) 01/30/2019    Obesity     GAYATHRI (obstructive sleep apnea) 2009    PVC (premature ventricular contraction) 11/27/2019    Unspecified sleep apnea     cpap    Venous insufficiency of both lower extremities 09/06/2016    Vitamin B12 deficiency     Vitamin D deficiency          MEDICATIONS:  Prior to Visit Medications    Medication Sig   potassium chloride (KLOR-CON M) 20 MEQ extended release tablet TAKE 1 TABLET BY MOUTH DAILY IF TAKING LASIX(FUROSEMIDE)   tiZANidine (ZANAFLEX) 4 MG tablet TAKE

## 2025-02-20 ENCOUNTER — TELEPHONE (OUTPATIENT)
Dept: INTERNAL MEDICINE CLINIC | Age: 65
End: 2025-02-20

## 2025-02-20 ENCOUNTER — PATIENT MESSAGE (OUTPATIENT)
Dept: INTERNAL MEDICINE CLINIC | Age: 65
End: 2025-02-20

## 2025-02-20 NOTE — TELEPHONE ENCOUNTER
Hello,   Patient states a pa is needed for her wegovy.   Will you please start if not in process already.     Thanks!       Alysia MORENO [facility] Practice Support (supporting River Figueroa DO)3 minutes ago (10:59 AM)       I spoke to my health insurance company, [employer], they said that I needed a prior authorization to be able to get my prescription for Wegovy filled. Would you please process the prior authorization? Thank you.     Alysia Braxton

## 2025-02-21 NOTE — TELEPHONE ENCOUNTER
Submitted PA for Wegovy 0.25MG/0.5ML auto-injectors  Via CMM Key: [id number] STATUS: PENDING.    Follow up done daily; if no decision with in three days we will refax.  If another three days goes by with no decision will call the insurance for status.

## 2025-02-24 NOTE — TELEPHONE ENCOUNTER
The medication is APPROVED THROUGH 09/21/2025.    If this requires a response please respond to the pool ( P [facility] MEDICATION PRE-AUTH).      Thank you please advise patient.;

## 2025-03-04 DIAGNOSIS — F43.21 ADJUSTMENT DISORDER WITH DEPRESSED MOOD: ICD-10-CM

## 2025-03-04 RX ORDER — BUPROPION HYDROCHLORIDE 150 MG/1
300 TABLET ORAL EVERY MORNING
Qty: 90 TABLET | Refills: 3 | Status: SHIPPED | OUTPATIENT
Start: 2025-03-04

## 2025-03-12 NOTE — TELEPHONE ENCOUNTER
[facility] pharmacy is calling in for inin regards to needing a new script for :    Semaglutide-Weight Management (WEGOVY) 0.25 MG/0.5ML SOAJ SC injection   Last appointment: 2/8/2025  Next appointment: 3/17/2025  Last refill: 2.8.25      Please send request and PA to :      [facility]/pharmacy #[id number] - [place], [place] - [address] - P [phone] -  [phone]

## 2025-03-17 ENCOUNTER — OFFICE VISIT (OUTPATIENT)
Dept: INTERNAL MEDICINE CLINIC | Age: 65
End: 2025-03-17
Payer: COMMERCIAL

## 2025-03-17 VITALS
TEMPERATURE: 97 F | DIASTOLIC BLOOD PRESSURE: 70 MMHG | WEIGHT: 207 LBS | HEART RATE: 79 BPM | BODY MASS INDEX: 37.86 KG/M2 | OXYGEN SATURATION: 93 % | SYSTOLIC BLOOD PRESSURE: 124 MMHG

## 2025-03-17 DIAGNOSIS — R73.01 IMPAIRED FASTING GLUCOSE: ICD-10-CM

## 2025-03-17 DIAGNOSIS — I10 BENIGN ESSENTIAL HTN: ICD-10-CM

## 2025-03-17 DIAGNOSIS — I87.2 VENOUS INSUFFICIENCY OF BOTH LOWER EXTREMITIES: ICD-10-CM

## 2025-03-17 DIAGNOSIS — E66.01 CLASS 2 SEVERE OBESITY DUE TO EXCESS CALORIES WITH SERIOUS COMORBIDITY AND BODY MASS INDEX (BMI) OF 38.0 TO 38.9 IN ADULT: Primary | ICD-10-CM

## 2025-03-17 DIAGNOSIS — E55.9 VITAMIN D DEFICIENCY: ICD-10-CM

## 2025-03-17 DIAGNOSIS — E53.8 B12 DEFICIENCY: ICD-10-CM

## 2025-03-17 DIAGNOSIS — Z91.89 AT INCREASED RISK FOR CARDIOVASCULAR DISEASE: ICD-10-CM

## 2025-03-17 DIAGNOSIS — E53.8 FOLATE DEFICIENCY: ICD-10-CM

## 2025-03-17 DIAGNOSIS — E66.812 CLASS 2 SEVERE OBESITY DUE TO EXCESS CALORIES WITH SERIOUS COMORBIDITY AND BODY MASS INDEX (BMI) OF 38.0 TO 38.9 IN ADULT: Primary | ICD-10-CM

## 2025-03-17 PROBLEM — A09 TRAVELER'S DIARRHEA: Status: RESOLVED | Noted: 2022-10-25 | Resolved: 2025-03-17

## 2025-03-17 PROCEDURE — 3078F DIAST BP <80 MM HG: CPT | Performed by: INTERNAL MEDICINE

## 2025-03-17 PROCEDURE — 99214 OFFICE O/P EST MOD 30 MIN: CPT | Performed by: INTERNAL MEDICINE

## 2025-03-17 PROCEDURE — 3074F SYST BP LT 130 MM HG: CPT | Performed by: INTERNAL MEDICINE

## 2025-03-17 RX ORDER — FUROSEMIDE 40 MG/1
40 TABLET ORAL DAILY PRN
COMMUNITY

## 2025-03-17 NOTE — PROGRESS NOTES
[facility]  Internal Medicine  Patient Encounter  River Figueroa D.O., [facility]        Chief Complaint   Patient presents with    Follow-up     1 mo       HPI: 64 y.o. female seen today for a checkup regarding the status of current issues as below with a medication review and reconciliation.    Patient has other medical problems including but not limited to obesity, anxiety, depression, asthma, fibromyalgia, venous insufficiency, GAYATHRI, impaired fasting glucose, iron deficiency anemia, vitamin D deficiency, vitamin B12 deficiency    We started her back on Wegovy for weight management. She has been on the Wegovy 0.25 mg weekly.  She has completed 4 weeks.  She tolerates the medication well.  She reports early satiety.  She denies N/V/D/C.      She is due for lab.    She has been taking her B12.  She denies CP, SOB, Palpitations, abd pain, palpitations.  Her swelling is well controlled with compression stockings.    She denies dizziness, syncope.    She has had a low grade headache intermittently in the last couple of weeks.        Past Medical History:   Diagnosis Date    Allergic rhinitis     Anxiety     Asthma     Benign essential HTN 12/26/2019    COVID-19 virus infection 05/02/2023    Depression     Fibromyalgia 08/26/2022    Gastritis     GERD (gastroesophageal reflux disease)     Hearing disorder, sensorineural 01/21/2015    Hiatal hernia     Impaired fasting glucose 02/12/2021    Iron defic anemia     OAB (overactive bladder) 01/30/2019    Obesity     GAYATHRI (obstructive sleep apnea) 2009    PVC (premature ventricular contraction) 11/27/2019    Unspecified sleep apnea     cpap    Venous insufficiency of both lower extremities 09/06/2016    Vitamin B12 deficiency     Vitamin D deficiency          MEDICATIONS:  Prior to Visit Medications    Medication Sig   furosemide (LASIX) 40 MG tablet Take 1 tablet by mouth daily as needed (As needed when LE edema is noted)   Semaglutide-Weight Management (WEGOVY) 0.5

## 2025-03-17 NOTE — PATIENT INSTRUCTIONS
To do list:    #1 continue to improve efforts with Lifestyle modification including low calorie diet focusing on Low fat/low cholesterol and low carbohydrate intake, along with  increasing cardiovascular (aerobic) exercise.

## 2025-03-18 DIAGNOSIS — E55.9 VITAMIN D DEFICIENCY: ICD-10-CM

## 2025-03-18 DIAGNOSIS — E53.8 FOLATE DEFICIENCY: ICD-10-CM

## 2025-03-18 DIAGNOSIS — R73.01 IMPAIRED FASTING GLUCOSE: ICD-10-CM

## 2025-03-18 DIAGNOSIS — E53.8 B12 DEFICIENCY: ICD-10-CM

## 2025-03-18 DIAGNOSIS — I10 BENIGN ESSENTIAL HTN: ICD-10-CM

## 2025-03-18 LAB
25(OH)D3 SERPL-MCNC: 46.3 NG/ML
ALBUMIN SERPL-MCNC: 4.3 G/DL (ref 3.4–5)
ALBUMIN/GLOB SERPL: 1.9 {RATIO} (ref 1.1–2.2)
ALP SERPL-CCNC: 92 U/L (ref 40–129)
ALT SERPL-CCNC: 28 U/L (ref 10–40)
ANION GAP SERPL CALCULATED.3IONS-SCNC: 9 MMOL/L (ref 3–16)
AST SERPL-CCNC: 22 U/L (ref 15–37)
BASOPHILS # BLD: 0.1 K/UL (ref 0–0.2)
BASOPHILS NFR BLD: 0.8 %
BILIRUB SERPL-MCNC: <0.2 MG/DL (ref 0–1)
BUN SERPL-MCNC: 14 MG/DL (ref 7–20)
CALCIUM SERPL-MCNC: 10 MG/DL (ref 8.3–10.6)
CHLORIDE SERPL-SCNC: 101 MMOL/L (ref 99–110)
CHOLEST SERPL-MCNC: 202 MG/DL (ref 0–199)
CO2 SERPL-SCNC: 27 MMOL/L (ref 21–32)
CREAT SERPL-MCNC: 0.8 MG/DL (ref 0.6–1.2)
DEPRECATED RDW RBC AUTO: 15.3 % (ref 12.4–15.4)
EOSINOPHIL # BLD: 0 K/UL (ref 0–0.6)
EOSINOPHIL NFR BLD: 0.7 %
EST. AVERAGE GLUCOSE BLD GHB EST-MCNC: 114 MG/DL
FOLATE SERPL-MCNC: >40 NG/ML (ref 4.78–24.2)
GFR SERPLBLD CREATININE-BSD FMLA CKD-EPI: 82 ML/MIN/{1.73_M2}
GLUCOSE SERPL-MCNC: 82 MG/DL (ref 70–99)
HBA1C MFR BLD: 5.6 %
HCT VFR BLD AUTO: 39.4 % (ref 36–48)
HDLC SERPL-MCNC: 67 MG/DL (ref 40–60)
HGB BLD-MCNC: 12.9 G/DL (ref 12–16)
LDLC SERPL CALC-MCNC: 120 MG/DL
LYMPHOCYTES # BLD: 1.6 K/UL (ref 1–5.1)
LYMPHOCYTES NFR BLD: 24.9 %
MCH RBC QN AUTO: 30.2 PG (ref 26–34)
MCHC RBC AUTO-ENTMCNC: 32.9 G/DL (ref 31–36)
MCV RBC AUTO: 92 FL (ref 80–100)
MONOCYTES # BLD: 0.6 K/UL (ref 0–1.3)
MONOCYTES NFR BLD: 9.5 %
NEUTROPHILS # BLD: 4 K/UL (ref 1.7–7.7)
NEUTROPHILS NFR BLD: 64.1 %
PLATELET # BLD AUTO: 449 K/UL (ref 135–450)
PMV BLD AUTO: 8.2 FL (ref 5–10.5)
POTASSIUM SERPL-SCNC: 4.7 MMOL/L (ref 3.5–5.1)
PROT SERPL-MCNC: 6.6 G/DL (ref 6.4–8.2)
RBC # BLD AUTO: 4.28 M/UL (ref 4–5.2)
SODIUM SERPL-SCNC: 137 MMOL/L (ref 136–145)
TRIGL SERPL-MCNC: 76 MG/DL (ref 0–150)
TSH SERPL DL<=0.005 MIU/L-ACNC: 0.97 UIU/ML (ref 0.27–4.2)
VIT B12 SERPL-MCNC: 2233 PG/ML (ref 211–911)
VLDLC SERPL CALC-MCNC: 15 MG/DL
WBC # BLD AUTO: 6.3 K/UL (ref 4–11)

## 2025-03-19 ENCOUNTER — RESULTS FOLLOW-UP (OUTPATIENT)
Dept: INTERNAL MEDICINE CLINIC | Age: 65
End: 2025-03-19

## 2025-03-23 DIAGNOSIS — E53.8 B12 DEFICIENCY: ICD-10-CM

## 2025-03-24 RX ORDER — CYANOCOBALAMIN 1000 UG/ML
1000 INJECTION, SOLUTION INTRAMUSCULAR; SUBCUTANEOUS
Qty: 3 ML | Refills: 3 | Status: SHIPPED | OUTPATIENT
Start: 2025-03-24

## 2025-04-04 ENCOUNTER — PATIENT MESSAGE (OUTPATIENT)
Dept: INTERNAL MEDICINE CLINIC | Age: 65
End: 2025-04-04

## 2025-04-11 DIAGNOSIS — E66.812 CLASS 2 SEVERE OBESITY DUE TO EXCESS CALORIES WITH SERIOUS COMORBIDITY AND BODY MASS INDEX (BMI) OF 38.0 TO 38.9 IN ADULT: ICD-10-CM

## 2025-04-11 DIAGNOSIS — E66.01 CLASS 2 SEVERE OBESITY DUE TO EXCESS CALORIES WITH SERIOUS COMORBIDITY AND BODY MASS INDEX (BMI) OF 38.0 TO 38.9 IN ADULT: ICD-10-CM

## 2025-04-11 DIAGNOSIS — Z91.89 AT INCREASED RISK FOR CARDIOVASCULAR DISEASE: ICD-10-CM

## 2025-04-13 DIAGNOSIS — I10 BENIGN ESSENTIAL HTN: ICD-10-CM

## 2025-04-14 RX ORDER — VALSARTAN 80 MG/1
80 TABLET ORAL DAILY
Qty: 90 TABLET | Refills: 3 | Status: SHIPPED | OUTPATIENT
Start: 2025-04-14

## 2025-04-14 RX ORDER — SEMAGLUTIDE 0.5 MG/.5ML
0.5 INJECTION, SOLUTION SUBCUTANEOUS
OUTPATIENT
Start: 2025-04-14

## 2025-05-09 DIAGNOSIS — E66.01 CLASS 2 SEVERE OBESITY DUE TO EXCESS CALORIES WITH SERIOUS COMORBIDITY AND BODY MASS INDEX (BMI) OF 38.0 TO 38.9 IN ADULT (HCC): ICD-10-CM

## 2025-05-09 DIAGNOSIS — Z91.89 AT INCREASED RISK FOR CARDIOVASCULAR DISEASE: ICD-10-CM

## 2025-05-09 DIAGNOSIS — E66.812 CLASS 2 SEVERE OBESITY DUE TO EXCESS CALORIES WITH SERIOUS COMORBIDITY AND BODY MASS INDEX (BMI) OF 38.0 TO 38.9 IN ADULT (HCC): ICD-10-CM

## 2025-05-09 RX ORDER — SEMAGLUTIDE 1 MG/.5ML
1 INJECTION, SOLUTION SUBCUTANEOUS
Qty: 2 ML | Refills: 0 | OUTPATIENT
Start: 2025-05-09

## 2025-05-09 NOTE — TELEPHONE ENCOUNTER
Julita needs a refill of Wegovy. Currently on 1 mg weekly but thinks dose is to be increased. Please send to [facility]    Last appointment: 3/17/2025  Next appointment: 7/1/2025  Last refill: 4/14/2025

## 2025-05-14 ENCOUNTER — PATIENT MESSAGE (OUTPATIENT)
Dept: INTERNAL MEDICINE CLINIC | Age: 65
End: 2025-05-14

## 2025-06-12 RX ORDER — SEMAGLUTIDE 1.7 MG/.75ML
INJECTION, SOLUTION SUBCUTANEOUS
Qty: 3 ML | Refills: 3 | Status: SHIPPED | OUTPATIENT
Start: 2025-06-12

## 2025-06-12 NOTE — TELEPHONE ENCOUNTER
Last appointment: 3/17/2025  Next appointment: 7/1/2025        Last refill: (5/14/2025) by River Figueroa DO

## 2025-07-01 ENCOUNTER — OFFICE VISIT (OUTPATIENT)
Dept: INTERNAL MEDICINE CLINIC | Age: 65
End: 2025-07-01
Payer: COMMERCIAL

## 2025-07-01 VITALS
SYSTOLIC BLOOD PRESSURE: 114 MMHG | HEART RATE: 78 BPM | WEIGHT: 197 LBS | DIASTOLIC BLOOD PRESSURE: 64 MMHG | TEMPERATURE: 97.2 F | OXYGEN SATURATION: 95 % | BODY MASS INDEX: 36.03 KG/M2

## 2025-07-01 DIAGNOSIS — G47.33 OSA (OBSTRUCTIVE SLEEP APNEA): ICD-10-CM

## 2025-07-01 DIAGNOSIS — Z13.1 SCREENING FOR DIABETES MELLITUS: ICD-10-CM

## 2025-07-01 DIAGNOSIS — E53.8 B12 DEFICIENCY: ICD-10-CM

## 2025-07-01 DIAGNOSIS — R73.01 IMPAIRED FASTING GLUCOSE: ICD-10-CM

## 2025-07-01 DIAGNOSIS — Z00.00 ANNUAL PHYSICAL EXAM: Primary | ICD-10-CM

## 2025-07-01 DIAGNOSIS — M79.7 FIBROMYALGIA: ICD-10-CM

## 2025-07-01 DIAGNOSIS — E53.8 FOLATE DEFICIENCY: ICD-10-CM

## 2025-07-01 DIAGNOSIS — Z12.11 SCREEN FOR COLON CANCER: ICD-10-CM

## 2025-07-01 DIAGNOSIS — I87.2 VENOUS INSUFFICIENCY OF BOTH LOWER EXTREMITIES: ICD-10-CM

## 2025-07-01 DIAGNOSIS — I10 BENIGN ESSENTIAL HTN: ICD-10-CM

## 2025-07-01 PROCEDURE — 3078F DIAST BP <80 MM HG: CPT | Performed by: INTERNAL MEDICINE

## 2025-07-01 PROCEDURE — 99396 PREV VISIT EST AGE 40-64: CPT | Performed by: INTERNAL MEDICINE

## 2025-07-01 PROCEDURE — 3074F SYST BP LT 130 MM HG: CPT | Performed by: INTERNAL MEDICINE

## 2025-07-01 RX ORDER — SEMAGLUTIDE 1.7 MG/.75ML
1.7 INJECTION, SOLUTION SUBCUTANEOUS
COMMUNITY

## 2025-07-01 NOTE — PATIENT INSTRUCTIONS
09/14/2022, 09/14/2023    Pneumococcal, PCV20, PREVNAR 20, (age 6w+), IM, 0.5mL 08/26/2022    Pneumococcal, PPSV23, PNEUMOVAX 23, (age 2y+), SC/IM, 0.5mL 02/25/2016    TDaP, ADACEL (age 10y-64y), BOOSTRIX (age 10y+), IM, 0.5mL 08/11/2009, 11/27/2019    Yellow Fever (YF-Vax) 07/01/2022    Zoster Recombinant (Shingrix) 08/18/2021, 12/04/2021        Influenza vaccine:  recommended every fall    Pneumonia vaccine: Prevnar 20 due at age 65    Tetanus vaccine:  tetanus and diptheria/Pertussis vaccine (Td/Tdap) recommended every 10 years- Tdap is due 2029    Shingles vaccine:  Shingrx is complete    RSV vaccine-recommended.  Can consider in the fall    COVID-19 vaccine-recommended         Additional Recommendations   1.  Use sunscreen daily to help reduce the risk of skin cancer  2.  Continue working on lifestyle modification including a calorie restricted and portion control diet focusing on low-fat options as well as low carb options.  3. Update your eye exam every 2 years to screen for glaucoma, cataracts, and macular degeneration  4. Always wear a seatbelt while riding in a car        Here are a few  Reliable websites with a variety of health and wellness information:   www.my7Roadcheck.heart.org     www.nutritionsource.org     www.americanheart.org     www.diabetes.org      www.menopause.org     www.[facility]     www.[email or web address] ([facility] Wellness site)

## 2025-07-01 NOTE — PROGRESS NOTES
[facility]  Internal Medicine  Patient Encounter  River Figueroa D.O., [facility]      Annual Preventative Physical    Chief Complaint   Patient presents with    Annual Exam       HPI-- 64 y.o. female presents today requesting a complete annual preventative history and physical.       Medical/Surgical Histories     Past Medical History:   Diagnosis Date    Allergic rhinitis     Anxiety     Asthma     Benign essential HTN 12/26/2019    COVID-19 virus infection 05/02/2023    Depression     Fibromyalgia 08/26/2022    Gastritis     GERD (gastroesophageal reflux disease)     Hearing disorder, sensorineural 01/21/2015    Hiatal hernia     Impaired fasting glucose 02/12/2021    Iron defic anemia     OAB (overactive bladder) 01/30/2019    Obesity     GAYATHRI (obstructive sleep apnea) 2009    PVC (premature ventricular contraction) 11/27/2019    Unspecified sleep apnea     cpap    Venous insufficiency of both lower extremities 09/06/2016    Vitamin B12 deficiency     Vitamin D deficiency           Past Surgical History:   Procedure Laterality Date    BREAST BIOPSY Left 06/18/2024    Benign    BREAST REDUCTION SURGERY      BREAST SURGERY  1996    reduction & tumor    CARPAL TUNNEL RELEASE      HYSTEROSCOPY  08/2009    and fibroid removal    LIPOSUCTION      chin    YARITZA STEREO BREAST BX W LOC DEVICE 1ST LESION LEFT Left 06/18/2024    [facility] STEROTACTIC LOC BREAST BIOPSY LEFT 6/18/2024 [facility]    UPPER GASTROINTESTINAL ENDOSCOPY  2005    UPPER GASTROINTESTINAL ENDOSCOPY  07/22/2011           Medications/Allergies     Prior to Visit Medications    Medication Sig   Semaglutide-Weight Management (WEGOVY) 1.7 MG/0.75ML SOAJ SC injection Inject 1.7 mg into the skin every 7 days   valsartan (DIOVAN) 80 MG tablet TAKE 1 TABLET BY MOUTH DAILY   SYRINGE-NEEDLE, DISP, 3 ML 25G X 1-1/2\" 3 ML MISC Please supply syringe with safety needle appropriate for self injection of 1ml B12 every 30 days. May change according to

## 2025-07-17 RX ORDER — DULOXETIN HYDROCHLORIDE 60 MG/1
CAPSULE, DELAYED RELEASE ORAL DAILY
Qty: 90 CAPSULE | Refills: 3 | Status: SHIPPED | OUTPATIENT
Start: 2025-07-17

## 2025-07-17 NOTE — TELEPHONE ENCOUNTER
Last appointment: 7/1/2025  Next appointment: 12/22/2025        Last refill: (7/27/2024) by River Figueroa DO

## 2025-07-18 RX ORDER — SEMAGLUTIDE 1.7 MG/.75ML
1.7 INJECTION, SOLUTION SUBCUTANEOUS
Qty: 3 ML | Refills: 0 | Status: SHIPPED | OUTPATIENT
Start: 2025-07-18

## 2025-07-18 NOTE — TELEPHONE ENCOUNTER
The patient called into the office for Dr. Figueroa to see about getting a Rx refill for the:    WEGOVY 1.7 MG/0.75ML SOAJ SC injection (Discontinued)   Last appointment: 7/1/2025  Next appointment: 12/22/2025  Last refill: 6/12/2025    Patient mentioned being a day behind due to thinking she had refills on the medication but didn't.     Pharmacy    [id number] - [place], [place] - [address] - P [phone] - F [phone] [address] [address]  Phone: [phone]  Fax: [phone]

## 2025-08-11 RX ORDER — SEMAGLUTIDE 1.7 MG/.75ML
1.7 INJECTION, SOLUTION SUBCUTANEOUS
Qty: 3 ML | Refills: 0 | Status: SHIPPED | OUTPATIENT
Start: 2025-08-11

## 2025-08-13 DIAGNOSIS — R73.01 IMPAIRED FASTING GLUCOSE: ICD-10-CM

## 2025-08-13 DIAGNOSIS — E66.812 CLASS 2 SEVERE OBESITY DUE TO EXCESS CALORIES WITH SERIOUS COMORBIDITY AND BODY MASS INDEX (BMI) OF 38.0 TO 38.9 IN ADULT (HCC): ICD-10-CM

## 2025-08-13 DIAGNOSIS — E66.01 CLASS 2 SEVERE OBESITY DUE TO EXCESS CALORIES WITH SERIOUS COMORBIDITY AND BODY MASS INDEX (BMI) OF 38.0 TO 38.9 IN ADULT (HCC): ICD-10-CM

## 2025-08-15 ENCOUNTER — OFFICE VISIT (OUTPATIENT)
Dept: INTERNAL MEDICINE CLINIC | Age: 65
End: 2025-08-15
Payer: COMMERCIAL

## 2025-08-15 ENCOUNTER — TELEPHONE (OUTPATIENT)
Dept: INTERNAL MEDICINE CLINIC | Age: 65
End: 2025-08-15

## 2025-08-15 VITALS
HEART RATE: 85 BPM | SYSTOLIC BLOOD PRESSURE: 114 MMHG | DIASTOLIC BLOOD PRESSURE: 68 MMHG | OXYGEN SATURATION: 97 % | TEMPERATURE: 97 F | WEIGHT: 199 LBS | BODY MASS INDEX: 36.4 KG/M2

## 2025-08-15 DIAGNOSIS — I87.8 VENOUS STASIS: ICD-10-CM

## 2025-08-15 DIAGNOSIS — L03.115 BILATERAL LOWER LEG CELLULITIS: Primary | ICD-10-CM

## 2025-08-15 DIAGNOSIS — L03.115 BILATERAL LOWER LEG CELLULITIS: ICD-10-CM

## 2025-08-15 DIAGNOSIS — I10 BENIGN ESSENTIAL HTN: ICD-10-CM

## 2025-08-15 DIAGNOSIS — L03.116 BILATERAL LOWER LEG CELLULITIS: ICD-10-CM

## 2025-08-15 DIAGNOSIS — Z13.1 SCREENING FOR DIABETES MELLITUS: ICD-10-CM

## 2025-08-15 DIAGNOSIS — I87.2 VENOUS INSUFFICIENCY OF BOTH LOWER EXTREMITIES: ICD-10-CM

## 2025-08-15 DIAGNOSIS — I77.6 VASCULITIS: ICD-10-CM

## 2025-08-15 DIAGNOSIS — R60.0 BILATERAL LEG EDEMA: ICD-10-CM

## 2025-08-15 DIAGNOSIS — R73.01 IMPAIRED FASTING GLUCOSE: ICD-10-CM

## 2025-08-15 DIAGNOSIS — L03.116 BILATERAL LOWER LEG CELLULITIS: Primary | ICD-10-CM

## 2025-08-15 DIAGNOSIS — Z00.00 ANNUAL PHYSICAL EXAM: ICD-10-CM

## 2025-08-15 PROCEDURE — 3074F SYST BP LT 130 MM HG: CPT | Performed by: INTERNAL MEDICINE

## 2025-08-15 PROCEDURE — 3078F DIAST BP <80 MM HG: CPT | Performed by: INTERNAL MEDICINE

## 2025-08-15 PROCEDURE — 99214 OFFICE O/P EST MOD 30 MIN: CPT | Performed by: INTERNAL MEDICINE

## 2025-08-15 RX ORDER — PREDNISONE 20 MG/1
20 TABLET ORAL DAILY
Qty: 5 TABLET | Refills: 0 | Status: SHIPPED | OUTPATIENT
Start: 2025-08-15 | End: 2025-08-20

## 2025-08-15 RX ORDER — CEPHALEXIN 500 MG/1
500 CAPSULE ORAL 4 TIMES DAILY
COMMUNITY

## 2025-08-15 RX ORDER — DOXYCYCLINE HYCLATE 100 MG
100 TABLET ORAL 2 TIMES DAILY
Qty: 20 TABLET | Refills: 0 | Status: SHIPPED | OUTPATIENT
Start: 2025-08-15 | End: 2025-08-25

## 2025-08-15 RX ORDER — CEPHALEXIN 500 MG/1
500 CAPSULE ORAL 4 TIMES DAILY
Qty: 12 CAPSULE | Refills: 0 | Status: SHIPPED | OUTPATIENT
Start: 2025-08-15 | End: 2025-08-18

## 2025-08-16 LAB
ALBUMIN SERPL-MCNC: 4.1 G/DL (ref 3.4–5)
ALBUMIN/GLOB SERPL: 1.6 {RATIO} (ref 1.1–2.2)
ALP SERPL-CCNC: 88 U/L (ref 40–129)
ALT SERPL-CCNC: 22 U/L (ref 10–40)
ANION GAP SERPL CALCULATED.3IONS-SCNC: 11 MMOL/L (ref 3–16)
AST SERPL-CCNC: 23 U/L (ref 15–37)
BASOPHILS # BLD: 0 K/UL (ref 0–0.2)
BASOPHILS NFR BLD: 1.4 %
BILIRUB SERPL-MCNC: <0.2 MG/DL (ref 0–1)
BUN SERPL-MCNC: 11 MG/DL (ref 7–20)
CALCIUM SERPL-MCNC: 9.9 MG/DL (ref 8.3–10.6)
CHLORIDE SERPL-SCNC: 102 MMOL/L (ref 99–110)
CHOLEST SERPL-MCNC: 174 MG/DL (ref 0–199)
CO2 SERPL-SCNC: 25 MMOL/L (ref 21–32)
CREAT SERPL-MCNC: 0.8 MG/DL (ref 0.6–1.2)
CRP SERPL-MCNC: <3 MG/L (ref 0–5.1)
DEPRECATED RDW RBC AUTO: 14 % (ref 12.4–15.4)
EOSINOPHIL # BLD: 0.1 K/UL (ref 0–0.6)
EOSINOPHIL NFR BLD: 3.3 %
ERYTHROCYTE [SEDIMENTATION RATE] IN BLOOD BY WESTERGREN METHOD: 41 MM/HR (ref 0–30)
EST. AVERAGE GLUCOSE BLD GHB EST-MCNC: 111.2 MG/DL
GFR SERPLBLD CREATININE-BSD FMLA CKD-EPI: 82 ML/MIN/{1.73_M2}
GLUCOSE SERPL-MCNC: 70 MG/DL (ref 70–99)
HBA1C MFR BLD: 5.5 %
HCT VFR BLD AUTO: 39.9 % (ref 36–48)
HDLC SERPL-MCNC: 55 MG/DL (ref 40–60)
HGB BLD-MCNC: 13.1 G/DL (ref 12–16)
LDLC SERPL CALC-MCNC: 101 MG/DL
LYMPHOCYTES # BLD: 1.2 K/UL (ref 1–5.1)
LYMPHOCYTES NFR BLD: 33 %
MCH RBC QN AUTO: 30.4 PG (ref 26–34)
MCHC RBC AUTO-ENTMCNC: 32.8 G/DL (ref 31–36)
MCV RBC AUTO: 92.9 FL (ref 80–100)
MONOCYTES # BLD: 0.4 K/UL (ref 0–1.3)
MONOCYTES NFR BLD: 12 %
NEUTROPHILS # BLD: 1.8 K/UL (ref 1.7–7.7)
NEUTROPHILS NFR BLD: 50.3 %
PLATELET # BLD AUTO: 320 K/UL (ref 135–450)
PMV BLD AUTO: 8.8 FL (ref 5–10.5)
POTASSIUM SERPL-SCNC: 4.2 MMOL/L (ref 3.5–5.1)
PROT SERPL-MCNC: 6.6 G/DL (ref 6.4–8.2)
RBC # BLD AUTO: 4.29 M/UL (ref 4–5.2)
SODIUM SERPL-SCNC: 138 MMOL/L (ref 136–145)
TRIGL SERPL-MCNC: 91 MG/DL (ref 0–150)
VLDLC SERPL CALC-MCNC: 18 MG/DL
WBC # BLD AUTO: 3.5 K/UL (ref 4–11)

## 2025-08-25 ENCOUNTER — OFFICE VISIT (OUTPATIENT)
Dept: INTERNAL MEDICINE CLINIC | Age: 65
End: 2025-08-25
Payer: COMMERCIAL

## 2025-08-25 VITALS
HEART RATE: 82 BPM | BODY MASS INDEX: 36.03 KG/M2 | DIASTOLIC BLOOD PRESSURE: 74 MMHG | OXYGEN SATURATION: 98 % | WEIGHT: 197 LBS | TEMPERATURE: 97.3 F | SYSTOLIC BLOOD PRESSURE: 106 MMHG

## 2025-08-25 DIAGNOSIS — L03.116 BILATERAL LOWER LEG CELLULITIS: Primary | ICD-10-CM

## 2025-08-25 DIAGNOSIS — L03.115 BILATERAL LOWER LEG CELLULITIS: Primary | ICD-10-CM

## 2025-08-25 DIAGNOSIS — I87.8 VENOUS STASIS: ICD-10-CM

## 2025-08-25 DIAGNOSIS — I87.2 CHRONIC VENOUS INSUFFICIENCY: ICD-10-CM

## 2025-08-25 PROCEDURE — 3078F DIAST BP <80 MM HG: CPT | Performed by: INTERNAL MEDICINE

## 2025-08-25 PROCEDURE — 3074F SYST BP LT 130 MM HG: CPT | Performed by: INTERNAL MEDICINE

## 2025-08-25 PROCEDURE — 99212 OFFICE O/P EST SF 10 MIN: CPT | Performed by: INTERNAL MEDICINE

## 2025-08-29 ASSESSMENT — SLEEP AND FATIGUE QUESTIONNAIRES
HOW LIKELY ARE YOU TO NOD OFF OR FALL ASLEEP WHILE SITTING INACTIVE IN A PUBLIC PLACE: SLIGHT CHANCE OF DOZING
ESS TOTAL SCORE: 9
HOW LIKELY ARE YOU TO NOD OFF OR FALL ASLEEP WHILE SITTING AND READING: WOULD NEVER DOZE
HOW LIKELY ARE YOU TO NOD OFF OR FALL ASLEEP WHEN YOU ARE A PASSENGER IN A CAR FOR AN HOUR WITHOUT A BREAK: HIGH CHANCE OF DOZING
HOW LIKELY ARE YOU TO NOD OFF OR FALL ASLEEP WHILE SITTING AND TALKING TO SOMEONE: SLIGHT CHANCE OF DOZING
HOW LIKELY ARE YOU TO NOD OFF OR FALL ASLEEP WHILE SITTING INACTIVE IN A PUBLIC PLACE: SLIGHT CHANCE OF DOZING
HOW LIKELY ARE YOU TO NOD OFF OR FALL ASLEEP WHILE SITTING AND TALKING TO SOMEONE: SLIGHT CHANCE OF DOZING
HOW LIKELY ARE YOU TO NOD OFF OR FALL ASLEEP WHILE WATCHING TV: SLIGHT CHANCE OF DOZING
HOW LIKELY ARE YOU TO NOD OFF OR FALL ASLEEP WHILE LYING DOWN TO REST IN THE AFTERNOON WHEN CIRCUMSTANCES PERMIT: HIGH CHANCE OF DOZING
HOW LIKELY ARE YOU TO NOD OFF OR FALL ASLEEP WHILE LYING DOWN TO REST IN THE AFTERNOON WHEN CIRCUMSTANCES PERMIT: HIGH CHANCE OF DOZING
HOW LIKELY ARE YOU TO NOD OFF OR FALL ASLEEP IN A CAR, WHILE STOPPED FOR A FEW MINUTES IN TRAFFIC: WOULD NEVER DOZE
HOW LIKELY ARE YOU TO NOD OFF OR FALL ASLEEP WHILE SITTING QUIETLY AFTER LUNCH WITHOUT ALCOHOL: WOULD NEVER DOZE
HOW LIKELY ARE YOU TO NOD OFF OR FALL ASLEEP WHILE WATCHING TV: SLIGHT CHANCE OF DOZING
HOW LIKELY ARE YOU TO NOD OFF OR FALL ASLEEP WHEN YOU ARE A PASSENGER IN A CAR FOR AN HOUR WITHOUT A BREAK: HIGH CHANCE OF DOZING
HOW LIKELY ARE YOU TO NOD OFF OR FALL ASLEEP WHILE SITTING AND READING: WOULD NEVER DOZE
HOW LIKELY ARE YOU TO NOD OFF OR FALL ASLEEP IN A CAR, WHILE STOPPED FOR A FEW MINUTES IN TRAFFIC: WOULD NEVER DOZE
HOW LIKELY ARE YOU TO NOD OFF OR FALL ASLEEP WHILE SITTING QUIETLY AFTER LUNCH WITHOUT ALCOHOL: WOULD NEVER DOZE

## 2025-09-05 ENCOUNTER — OFFICE VISIT (OUTPATIENT)
Dept: PULMONOLOGY | Age: 65
End: 2025-09-05
Payer: COMMERCIAL

## 2025-09-05 VITALS
OXYGEN SATURATION: 97 % | SYSTOLIC BLOOD PRESSURE: 116 MMHG | BODY MASS INDEX: 36.25 KG/M2 | DIASTOLIC BLOOD PRESSURE: 74 MMHG | WEIGHT: 197 LBS | HEART RATE: 80 BPM | HEIGHT: 62 IN

## 2025-09-05 DIAGNOSIS — G47.33 OSA (OBSTRUCTIVE SLEEP APNEA): Primary | ICD-10-CM

## 2025-09-05 DIAGNOSIS — I10 BENIGN ESSENTIAL HTN: ICD-10-CM

## 2025-09-05 DIAGNOSIS — E66.812 CLASS 2 SEVERE OBESITY DUE TO EXCESS CALORIES WITH SERIOUS COMORBIDITY AND BODY MASS INDEX (BMI) OF 36.0 TO 36.9 IN ADULT (HCC): ICD-10-CM

## 2025-09-05 DIAGNOSIS — E66.01 CLASS 2 SEVERE OBESITY DUE TO EXCESS CALORIES WITH SERIOUS COMORBIDITY AND BODY MASS INDEX (BMI) OF 36.0 TO 36.9 IN ADULT (HCC): ICD-10-CM

## 2025-09-05 PROCEDURE — 3078F DIAST BP <80 MM HG: CPT | Performed by: NURSE PRACTITIONER

## 2025-09-05 PROCEDURE — G2211 COMPLEX E/M VISIT ADD ON: HCPCS | Performed by: NURSE PRACTITIONER

## 2025-09-05 PROCEDURE — 99214 OFFICE O/P EST MOD 30 MIN: CPT | Performed by: NURSE PRACTITIONER

## 2025-09-05 PROCEDURE — 3074F SYST BP LT 130 MM HG: CPT | Performed by: NURSE PRACTITIONER
